# Patient Record
Sex: FEMALE | Race: WHITE | NOT HISPANIC OR LATINO | Employment: FULL TIME | URBAN - METROPOLITAN AREA
[De-identification: names, ages, dates, MRNs, and addresses within clinical notes are randomized per-mention and may not be internally consistent; named-entity substitution may affect disease eponyms.]

---

## 2017-01-25 ENCOUNTER — GENERIC CONVERSION - ENCOUNTER (OUTPATIENT)
Dept: OTHER | Facility: OTHER | Age: 57
End: 2017-01-25

## 2017-02-27 ENCOUNTER — ALLSCRIPTS OFFICE VISIT (OUTPATIENT)
Dept: OTHER | Facility: OTHER | Age: 57
End: 2017-02-27

## 2017-03-06 ENCOUNTER — ALLSCRIPTS OFFICE VISIT (OUTPATIENT)
Dept: OTHER | Facility: OTHER | Age: 57
End: 2017-03-06

## 2017-03-07 LAB
BILIRUB UR QL STRIP: NORMAL
CLARITY UR: NORMAL
COLOR UR: YELLOW
GLUCOSE (HISTORICAL): NORMAL
HGB UR QL STRIP.AUTO: NORMAL
KETONES UR STRIP-MCNC: NORMAL MG/DL
LEUKOCYTE ESTERASE UR QL STRIP: 500
NITRITE UR QL STRIP: NORMAL
PH UR STRIP.AUTO: 8 [PH]
PROT UR STRIP-MCNC: NORMAL MG/DL
SP GR UR STRIP.AUTO: 1
UROBILINOGEN UR QL STRIP.AUTO: NORMAL

## 2017-03-08 ENCOUNTER — LAB CONVERSION - ENCOUNTER (OUTPATIENT)
Dept: OTHER | Facility: OTHER | Age: 57
End: 2017-03-08

## 2017-03-08 LAB
BACTERIA UR QL AUTO: ABNORMAL /HPF
BILIRUB UR QL STRIP: NEGATIVE
COLOR UR: YELLOW
COMMENT (HISTORICAL): CLEAR
CULTURE RESULT (HISTORICAL): ABNORMAL
CULTURE RESULT (HISTORICAL): NORMAL
FECAL OCCULT BLOOD DIAGNOSTIC (HISTORICAL): NEGATIVE
GLUCOSE (HISTORICAL): NEGATIVE
HYALINE CASTS #/AREA URNS LPF: ABNORMAL /LPF
KETONES UR STRIP-MCNC: NEGATIVE MG/DL
LEUKOCYTE ESTERASE UR QL STRIP: ABNORMAL
NITRITE UR QL STRIP: NEGATIVE
PH UR STRIP.AUTO: 7.5 [PH] (ref 5–8)
PROT UR STRIP-MCNC: NEGATIVE MG/DL
RBC (HISTORICAL): ABNORMAL /HPF
SP GR UR STRIP.AUTO: 1.01 (ref 1–1.03)
SQUAMOUS EPITHELIAL CELLS (HISTORICAL): ABNORMAL /HPF
WBC # BLD AUTO: ABNORMAL /HPF

## 2017-06-02 ENCOUNTER — GENERIC CONVERSION - ENCOUNTER (OUTPATIENT)
Dept: OTHER | Facility: OTHER | Age: 57
End: 2017-06-02

## 2017-06-02 LAB
AMBIG ABBREV CMP14 DEFAULT (HISTORICAL): NORMAL
AMBIG ABBREV LP DEFAULT (HISTORICAL): NORMAL

## 2017-06-03 LAB
CHOLEST SERPL-MCNC: 248 MG/DL (ref 100–199)
HDLC SERPL-MCNC: 63 MG/DL
LDLC SERPL CALC-MCNC: 161 MG/DL (ref 0–99)
TRIGL SERPL-MCNC: 118 MG/DL (ref 0–149)
VLDLC SERPL CALC-MCNC: 24 MG/DL (ref 5–40)

## 2017-06-05 ENCOUNTER — GENERIC CONVERSION - ENCOUNTER (OUTPATIENT)
Dept: OTHER | Facility: OTHER | Age: 57
End: 2017-06-05

## 2017-07-28 ENCOUNTER — ALLSCRIPTS OFFICE VISIT (OUTPATIENT)
Dept: OTHER | Facility: OTHER | Age: 57
End: 2017-07-28

## 2017-07-28 DIAGNOSIS — E78.5 HYPERLIPIDEMIA: ICD-10-CM

## 2017-09-28 ENCOUNTER — ALLSCRIPTS OFFICE VISIT (OUTPATIENT)
Dept: OTHER | Facility: OTHER | Age: 57
End: 2017-09-28

## 2017-09-28 ENCOUNTER — GENERIC CONVERSION - ENCOUNTER (OUTPATIENT)
Dept: OTHER | Facility: OTHER | Age: 57
End: 2017-09-28

## 2017-09-28 LAB — S PYO AG THROAT QL: NEGATIVE

## 2017-10-28 ENCOUNTER — GENERIC CONVERSION - ENCOUNTER (OUTPATIENT)
Dept: OTHER | Facility: OTHER | Age: 57
End: 2017-10-28

## 2017-10-28 LAB
A/G RATIO (HISTORICAL): 1.7 (ref 1.2–2.2)
ALBUMIN SERPL BCP-MCNC: 4.3 G/DL (ref 3.5–5.5)
ALP SERPL-CCNC: 102 IU/L (ref 39–117)
ALT SERPL W P-5'-P-CCNC: 20 IU/L (ref 0–32)
AMBIG ABBREV CMP14 DEFAULT (HISTORICAL): NORMAL
AMBIG ABBREV LP DEFAULT (HISTORICAL): NORMAL
AST SERPL W P-5'-P-CCNC: 22 IU/L (ref 0–40)
BILIRUB SERPL-MCNC: 0.8 MG/DL (ref 0–1.2)
BUN SERPL-MCNC: 13 MG/DL (ref 6–24)
BUN/CREA RATIO (HISTORICAL): 18 (ref 9–23)
CALCIUM SERPL-MCNC: 9.4 MG/DL (ref 8.7–10.2)
CHLORIDE SERPL-SCNC: 101 MMOL/L (ref 96–106)
CHOLEST SERPL-MCNC: 215 MG/DL (ref 100–199)
CO2 SERPL-SCNC: 25 MMOL/L (ref 18–29)
CREAT SERPL-MCNC: 0.73 MG/DL (ref 0.57–1)
EGFR AFRICAN AMERICAN (HISTORICAL): 106 ML/MIN/1.73
EGFR-AMERICAN CALC (HISTORICAL): 92 ML/MIN/1.73
GLUCOSE SERPL-MCNC: 103 MG/DL (ref 65–99)
HDLC SERPL-MCNC: 60 MG/DL
LDLC SERPL CALC-MCNC: 133 MG/DL (ref 0–99)
POTASSIUM SERPL-SCNC: 4.2 MMOL/L (ref 3.5–5.2)
SODIUM SERPL-SCNC: 141 MMOL/L (ref 134–144)
TOT. GLOBULIN, SERUM (HISTORICAL): 2.5 G/DL (ref 1.5–4.5)
TOTAL PROTEIN (HISTORICAL): 6.8 G/DL (ref 6–8.5)
TRIGL SERPL-MCNC: 108 MG/DL (ref 0–149)
VLDLC SERPL CALC-MCNC: 22 MG/DL (ref 5–40)

## 2017-10-30 ENCOUNTER — GENERIC CONVERSION - ENCOUNTER (OUTPATIENT)
Dept: OTHER | Facility: OTHER | Age: 57
End: 2017-10-30

## 2018-01-12 VITALS
WEIGHT: 199 LBS | OXYGEN SATURATION: 98 % | HEART RATE: 79 BPM | RESPIRATION RATE: 17 BRPM | SYSTOLIC BLOOD PRESSURE: 132 MMHG | BODY MASS INDEX: 33.97 KG/M2 | TEMPERATURE: 99.2 F | DIASTOLIC BLOOD PRESSURE: 80 MMHG | HEIGHT: 64 IN

## 2018-01-12 VITALS
HEART RATE: 64 BPM | HEIGHT: 64 IN | BODY MASS INDEX: 34.23 KG/M2 | DIASTOLIC BLOOD PRESSURE: 80 MMHG | SYSTOLIC BLOOD PRESSURE: 140 MMHG | WEIGHT: 200.5 LBS

## 2018-01-12 NOTE — RESULT NOTES
Verified Results  (1) COMPREHENSIVE METABOLIC PANEL [id number] 47:17RT Evaline Lee     Test Name Result Flag Reference   Glucose, Serum 103 mg/dL H 65-99   BUN 13 mg/dL  6-24   Creatinine, Serum 0 73 mg/dL  0 57-1 00   BUN/Creatinine Ratio 18  9-23   Sodium, Serum 141 mmol/L  134-144   Potassium, Serum 4 2 mmol/L  3 5-5 2   Chloride, Serum 101 mmol/L     Carbon Dioxide, Total 25 mmol/L  18-29   Calcium, Serum 9 4 mg/dL  8 7-10 2   Protein, Total, Serum 6 8 g/dL  6 0-8 5   Albumin, Serum 4 3 g/dL  3 5-5 5   Globulin, Total 2 5 g/dL  1 5-4 5   A/G Ratio 1 7  1 2-2 2   Bilirubin, Total 0 8 mg/dL  0 0-1 2   Alkaline Phosphatase, S 102 IU/L     AST (SGOT) 22 IU/L  0-40   ALT (SGPT) 20 IU/L  0-32   eGFR If NonAfricn Am 92 mL/min/1 73  >59   eGFR If Africn Am 106 mL/min/1 73  >59     (LC) Lipid Panel [id number] 08:46AM Evaline Lee     Test Name Result Flag Reference   Cholesterol, Total 215 mg/dL H 100-199   Triglycerides 108 mg/dL  0-149   HDL Cholesterol 60 mg/dL  >39   VLDL Cholesterol Dami 22 mg/dL  5-40   LDL Cholesterol Calc 133 mg/dL H 0-99     [facility]) Cy Pyo CMP14 Default [id number] 08:46AM Evaline Lee     Test Name Result Flag Reference   Cy Pyo CMP14 Default Comment     A hand-written panel/profile was received from your office  In  accordance with the [facility] Ambiguous Test Code Policy dated July 3610, we have completed your order by using the closest currently  or formerly recognized AMA panel  We have assigned Comprehensive  Metabolic Panel (14), Test Code #093692 to this request   If this  is not the testing you wished to receive on this specimen, please  contact the [address] [place] [address] Client Inquiry/Technical Services Department  to clarify the test order  We appreciate your business  [facility]) Cy Pyo LP Default [id number] 08:46AM Evaline Lee     Test Name Result Flag Reference   Ambig Abbrev LP Default Comment     A hand-written panel/profile was received from your office  In  accordance with the [facility] Ambiguous Test Code Policy dated July 5531, we have completed your order by using the closest currently  or formerly recognized AMA panel  We have assigned Lipid Panel,  Test Code #115862 to this request  If this is not the testing you  wished to receive on this specimen, please contact the [facility]  Client Inquiry/Technical Services Department to clarify the test  order  We appreciate your business

## 2018-01-13 NOTE — RESULT NOTES
Verified Results  Rapid StrepA- POC [id number] 07:23PM [facility]     Test Name Result Flag Reference   Rapid Strep Negative

## 2018-01-14 VITALS
HEIGHT: 64 IN | WEIGHT: 201 LBS | HEART RATE: 78 BPM | BODY MASS INDEX: 34.31 KG/M2 | SYSTOLIC BLOOD PRESSURE: 122 MMHG | DIASTOLIC BLOOD PRESSURE: 80 MMHG

## 2018-01-14 VITALS
TEMPERATURE: 98.2 F | DIASTOLIC BLOOD PRESSURE: 86 MMHG | BODY MASS INDEX: 33.12 KG/M2 | HEART RATE: 72 BPM | RESPIRATION RATE: 16 BRPM | HEIGHT: 64 IN | WEIGHT: 194 LBS | SYSTOLIC BLOOD PRESSURE: 138 MMHG

## 2018-01-16 NOTE — RESULT NOTES
Verified Results  (1) COMPREHENSIVE METABOLIC PANEL [id number] 14:92LY [facility]     Test Name Result Flag Reference   Glucose, Serum 95 mg/dL  65-99   BUN 14 mg/dL  6-24   Creatinine, Serum 0 74 mg/dL  0 57-1 00   BUN/Creatinine Ratio 19  9-23   Sodium, Serum 141 mmol/L  134-144   Potassium, Serum 4 2 mmol/L  3 5-5 2   Chloride, Serum 101 mmol/L     Carbon Dioxide, Total 22 mmol/L  18-29   Calcium, Serum 9 3 mg/dL  8 7-10 2   Protein, Total, Serum 7 2 g/dL  6 0-8 5   Albumin, Serum 4 4 g/dL  3 5-5 5   Globulin, Total 2 8 g/dL  1 5-4 5   A/G Ratio 1 6  1 2-2 2   Bilirubin, Total 0 8 mg/dL  0 0-1 2   Alkaline Phosphatase, S 100 IU/L     AST (SGOT) 21 IU/L  0-40   ALT (SGPT) 19 IU/L  0-32   eGFR If NonAfricn Am 91 mL/min/1 73  >59   eGFR If Africn Am 105 mL/min/1 73  >59     (LC) Lipid Panel [id number] 10:39AM [facility]     Test Name Result Flag Reference   Cholesterol, Total 248 mg/dL H 100-199   Triglycerides 118 mg/dL  0-149   HDL Cholesterol 63 mg/dL  >39   VLDL Cholesterol Dami 24 mg/dL  5-40   LDL Cholesterol Calc 161 mg/dL H 0-99     [facility]) Ap Kang CMP14 Default [id number] 10:39AM [facility]     Test Name Result Flag Reference   Ap Kang CMP14 Default Comment     A hand-written panel/profile was received from your office  In  accordance with the [facility] Ambiguous Test Code Policy dated July 8227, we have completed your order by using the closest currently  or formerly recognized AMA panel  We have assigned Comprehensive  Metabolic Panel (14), Test Code #728166 to this request   If this  is not the testing you wished to receive on this specimen, please  contact the [address] [place] [address] Client Inquiry/Technical Services Department  to clarify the test order  We appreciate your business  [facility]) Ap Dines LP Default [id number] 10:39AM [facility]     Test Name Result Flag Reference   Ambig Abbrev LP Default Comment     A hand-written panel/profile was received from your office  In  accordance with the [facility] Ambiguous Test Code Policy dated July 8803, we have completed your order by using the closest currently  or formerly recognized AMA panel  We have assigned Lipid Panel,  Test Code #542123 to this request  If this is not the testing you  wished to receive on this specimen, please contact the [address] [place] [address]  Client Inquiry/Technical Services Department to clarify the test  order  We appreciate your business

## 2018-01-22 VITALS
DIASTOLIC BLOOD PRESSURE: 80 MMHG | RESPIRATION RATE: 14 BRPM | OXYGEN SATURATION: 98 % | SYSTOLIC BLOOD PRESSURE: 122 MMHG | HEART RATE: 87 BPM

## 2018-01-22 VITALS — HEIGHT: 64 IN | BODY MASS INDEX: 34.15 KG/M2 | RESPIRATION RATE: 17 BRPM | WEIGHT: 200 LBS

## 2018-02-16 ENCOUNTER — CONSULT (OUTPATIENT)
Dept: VASCULAR SURGERY | Facility: CLINIC | Age: 58
End: 2018-02-16
Payer: COMMERCIAL

## 2018-02-16 VITALS
RESPIRATION RATE: 16 BRPM | HEART RATE: 82 BPM | HEIGHT: 63 IN | DIASTOLIC BLOOD PRESSURE: 82 MMHG | WEIGHT: 197 LBS | TEMPERATURE: 97.4 F | BODY MASS INDEX: 34.91 KG/M2 | SYSTOLIC BLOOD PRESSURE: 140 MMHG

## 2018-02-16 DIAGNOSIS — I83.811 VARICOSE VEINS OF RIGHT LOWER EXTREMITY WITH PAIN: Primary | ICD-10-CM

## 2018-02-16 PROBLEM — K29.70 GASTRITIS: Status: ACTIVE | Noted: 2017-07-28

## 2018-02-16 PROBLEM — M54.50 LOW BACK PAIN: Status: ACTIVE | Noted: 2017-02-27

## 2018-02-16 PROBLEM — I83.10 VARICOSE VEINS WITH INFLAMMATION: Status: ACTIVE | Noted: 2017-10-30

## 2018-02-16 PROBLEM — K44.9 HERNIA, HIATAL: Status: ACTIVE | Noted: 2017-07-28

## 2018-02-16 PROBLEM — G57.12 MERALGIA PARESTHETICA, LEFT LOWER LIMB: Status: ACTIVE | Noted: 2017-02-27

## 2018-02-16 PROCEDURE — 99244 OFF/OP CNSLTJ NEW/EST MOD 40: CPT | Performed by: NURSE PRACTITIONER

## 2018-02-16 NOTE — PATIENT INSTRUCTIONS
Bilateral lower extremity leg pain  -Your pain sounds multifactorial, related possibly to your back/discs and varicose veins  -Wearing compression stockings may help manage your symptoms  -Elevating, staying active and maintaining a healthy weight could help your symptoms  -If your pain gets worse or you develop swelling notify the office, otherwise we will be happy to see you on an as needed basis

## 2018-02-16 NOTE — PROGRESS NOTES
Assessment/Plan:    Varicose veins right lower extremity, bilateral lower extremity pain  -Experiencing bilateral lower extremity pain, no edema  Pain described as "traveling" down side of left leg, more consistent with neurogenic/lumbosacral origin, as well as aching only to right thigh, likely multifactorial  -RLE spider varicosities with small truncal vein at distal thigh  -Discussed the pathophysiology of venous disease  Advised the use of knee high compression stockings, periodic elevation, regular physical activity and weight management  -If her symptoms worsen she should notify the office, otherwise we will be happy to see her on an as needed basis     Diagnoses and all orders for this visit:    Varicose veins of right lower extremity with pain  -     Compression Stocking        Vitals:    02/16/18 0929   BP: 140/82   BP Location: Right arm   Patient Position: Sitting   Cuff Size: Adult   Pulse: 82   Resp: 16   Temp: (!) 97 4 °F (36 3 °C)   TempSrc: Tympanic   Weight: 89 4 kg (197 lb)   Height: 5' 3" (1 6 m)       Patient Active Problem List   Diagnosis    Blood pressure elevated without history of HTN    Neck pain    Esophageal reflux    Gastritis    Hernia, hiatal    Hyperlipidemia    Idiopathic peripheral neuropathy    Low back pain    Meralgia paresthetica, left lower limb    Varicose veins of right lower extremity with pain    Easy bruising       Past Surgical History:   Procedure Laterality Date    CHOLECYSTECTOMY      GALLBLADDER SURGERY         No family history on file  Social History     Social History    Marital status: /Civil Union     Spouse name: N/A    Number of children: N/A    Years of education: N/A     Occupational History    Not on file       Social History Main Topics    Smoking status: Former Smoker    Smokeless tobacco: Never Used      Comment: Quit at age 32    Alcohol use Yes      Comment: social    Drug use: No    Sexual activity: Not on file Other Topics Concern    Not on file     Social History Narrative    No narrative on file       No Known Allergies      Current Outpatient Prescriptions:     coenzyme Q-10 100 MG capsule, Take by mouth, Disp: , Rfl:     esomeprazole (NexIUM) 40 MG capsule, Take 1 capsule by mouth daily, Disp: , Rfl:     Omega-3 Fatty Acids (FISH OIL) 1,000 mg, Take by mouth, Disp: , Rfl:     minocycline (MINOCIN) 50 mg capsule, Take 1 capsule by mouth daily, Disp: , Rfl:     nabumetone (RELAFEN) 500 mg tablet, Take 1 tablet by mouth every 12 (twelve) hours as needed, Disp: , Rfl:     Chief Complaint: Pt is here because " I want to find out how to stop my varicose veins from progressing and what can be done with the ones I have  Also to clarify the pain in my legs, if it is coming from my veins"  Pt is here to evaluate VV/Spider veins to bilateral legs R>L  Pt states her veins are bulging and painful  Patient ID: Sachin Tineo is a 62 y o  female  Patient seen for evaluation of bilateral lower extremity pain  Pain is worse in the right leg where varicose veins are present  On the left leg she complains of pain to the left medial thigh when pressing it with her fingertips, as well as pain that travels down the side calf into her ankle  The pain in her right leg is located over lying varicose veins  She states that it aches when the veins or bulging  Her work until stenting for 6 hours, which makes the veins bulge more  The pain is not necessarily made worse at that time  She denies any edema  She does not wear compression stockings  She does not elevate  The following portions of the patient's history were reviewed and updated as appropriate: allergies, current medications, past family history, past medical history, past social history, past surgical history and problem list     Review of Systems   Constitutional: Negative  HENT: Negative  Eyes: Positive for pain  Respiratory: Negative  Cardiovascular: Negative  Painful veins   Gastrointestinal: Positive for constipation  Endocrine: Negative  Genitourinary: Negative  Musculoskeletal: Positive for joint swelling  Lumbar pain  Limp pain   Skin: Negative  Allergic/Immunologic: Negative  Neurological: Negative  Hematological: Negative  Psychiatric/Behavioral: Negative  Objective:     Physical Exam   Constitutional: She is oriented to person, place, and time  She appears well-developed and well-nourished  HENT:   Head: Normocephalic and atraumatic  Eyes: Conjunctivae are normal    Neck: Neck supple  No JVD present  Cardiovascular: Normal rate, regular rhythm and normal heart sounds  Pulses:       Dorsalis pedis pulses are 2+ on the right side, and 2+ on the left side  Posterior tibial pulses are 2+ on the left side  Spider varicosities right thigh and small truncal varicosity distal thigh, no varicose veins LLE   Pulmonary/Chest: Effort normal    Abdominal: Soft  She exhibits no distension  There is no tenderness  Musculoskeletal: Normal range of motion  She exhibits no edema  Neurological: She is alert and oriented to person, place, and time  Skin: Skin is warm and dry  Psychiatric: She has a normal mood and affect

## 2018-04-09 DIAGNOSIS — K21.9 GERD WITHOUT ESOPHAGITIS: Primary | ICD-10-CM

## 2018-04-09 RX ORDER — ESOMEPRAZOLE MAGNESIUM 40 MG/1
40 CAPSULE, DELAYED RELEASE ORAL DAILY
Qty: 30 CAPSULE | Refills: 0 | Status: SHIPPED | OUTPATIENT
Start: 2018-04-09 | End: 2018-05-07 | Stop reason: SDUPTHER

## 2018-04-14 LAB
ALBUMIN SERPL-MCNC: 4.3 G/DL (ref 3.5–5.5)
ALBUMIN/GLOB SERPL: 1.7 {RATIO} (ref 1.2–2.2)
ALP SERPL-CCNC: 99 IU/L (ref 39–117)
ALT SERPL-CCNC: 18 IU/L (ref 0–32)
AMBIG ABBREV DEFAULT: NORMAL
AMBIG ABBREV DEFAULT: NORMAL
AST SERPL-CCNC: 20 IU/L (ref 0–40)
BILIRUB SERPL-MCNC: 0.7 MG/DL (ref 0–1.2)
BUN SERPL-MCNC: 21 MG/DL (ref 6–24)
BUN/CREAT SERPL: 28 (ref 9–23)
CALCIUM SERPL-MCNC: 9.5 MG/DL (ref 8.7–10.2)
CHLORIDE SERPL-SCNC: 102 MMOL/L (ref 96–106)
CHOLEST SERPL-MCNC: 217 MG/DL (ref 100–199)
CO2 SERPL-SCNC: 22 MMOL/L (ref 18–29)
CREAT SERPL-MCNC: 0.76 MG/DL (ref 0.57–1)
GLOBULIN SER-MCNC: 2.6 G/DL (ref 1.5–4.5)
GLUCOSE SERPL-MCNC: 99 MG/DL (ref 65–99)
HDLC SERPL-MCNC: 65 MG/DL
LDLC SERPL CALC-MCNC: 131 MG/DL (ref 0–99)
POTASSIUM SERPL-SCNC: 4.2 MMOL/L (ref 3.5–5.2)
PROT SERPL-MCNC: 6.9 G/DL (ref 6–8.5)
SL AMB EGFR AFRICAN AMERICAN: 101 ML/MIN/1.73
SL AMB EGFR NON AFRICAN AMERICAN: 87 ML/MIN/1.73
SL AMB VLDL CHOLESTEROL CALC: 21 MG/DL (ref 5–40)
SODIUM SERPL-SCNC: 140 MMOL/L (ref 134–144)
TRIGL SERPL-MCNC: 106 MG/DL (ref 0–149)

## 2018-04-23 DIAGNOSIS — E78.5 HYPERLIPIDEMIA: ICD-10-CM

## 2018-05-04 RX ORDER — MELOXICAM 15 MG/1
TABLET ORAL
COMMUNITY
Start: 2018-02-16 | End: 2018-05-04

## 2018-05-07 ENCOUNTER — OFFICE VISIT (OUTPATIENT)
Dept: FAMILY MEDICINE CLINIC | Facility: CLINIC | Age: 58
End: 2018-05-07
Payer: COMMERCIAL

## 2018-05-07 VITALS
WEIGHT: 199 LBS | OXYGEN SATURATION: 98 % | HEIGHT: 63 IN | SYSTOLIC BLOOD PRESSURE: 132 MMHG | DIASTOLIC BLOOD PRESSURE: 76 MMHG | BODY MASS INDEX: 35.26 KG/M2 | RESPIRATION RATE: 16 BRPM | HEART RATE: 82 BPM

## 2018-05-07 DIAGNOSIS — I83.811 VARICOSE VEINS OF RIGHT LOWER EXTREMITY WITH PAIN: ICD-10-CM

## 2018-05-07 DIAGNOSIS — G60.9 IDIOPATHIC PERIPHERAL NEUROPATHY: ICD-10-CM

## 2018-05-07 DIAGNOSIS — K21.00 GASTROESOPHAGEAL REFLUX DISEASE WITH ESOPHAGITIS: ICD-10-CM

## 2018-05-07 DIAGNOSIS — K21.9 GERD WITHOUT ESOPHAGITIS: ICD-10-CM

## 2018-05-07 DIAGNOSIS — K44.9 HERNIA, HIATAL: ICD-10-CM

## 2018-05-07 DIAGNOSIS — E78.5 HYPERLIPIDEMIA, UNSPECIFIED HYPERLIPIDEMIA TYPE: Primary | ICD-10-CM

## 2018-05-07 DIAGNOSIS — G57.12 MERALGIA PARESTHETICA, LEFT LOWER LIMB: ICD-10-CM

## 2018-05-07 DIAGNOSIS — Z12.39 BREAST CANCER SCREENING: ICD-10-CM

## 2018-05-07 PROCEDURE — 99214 OFFICE O/P EST MOD 30 MIN: CPT | Performed by: FAMILY MEDICINE

## 2018-05-07 RX ORDER — ESOMEPRAZOLE MAGNESIUM 40 MG/1
40 CAPSULE, DELAYED RELEASE ORAL DAILY
Qty: 90 CAPSULE | Refills: 1 | Status: SHIPPED | OUTPATIENT
Start: 2018-05-07 | End: 2019-02-15 | Stop reason: SDUPTHER

## 2018-05-07 NOTE — PROGRESS NOTES
Subjective:      Patient ID: Sadiq Guevara is a 62 y o  female  80-year-old female presents for follow-up of chronic medical conditions including hyperlipidemia, meralgia paresthetica of the left leg  Overall the patient has been feeling well  In the past she was intolerant of statins  She has been taking 1 capsule of red yeast rice and 1 tablet of coenzyme Q10  Her cholesterol is significantly improved in fairly well controlled on this  Total cholesterol 217, HDL 65,   Patient did receive telephone call from a bariatric surgeon in regards to schedule an appointment to discuss treatment for her hiatal hernia  She has not made this yet  She has persistent reflux symptoms particularly when she does not take her Nexium  She states that when she does not take the Nexium she cannot eat due to the discomfort and reflux  When she does take Nexium she has no difficulty eating as there is no discomfort  To her this does pose something of a problem because it does not help with her weight loss  Patient does continue to have intermittent pains going down the left lower extremity across the left anterior thigh  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 to 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 because she tends to help out with her left leg bleeding 1st   She has never gotten any weakness or buckling of her left lower extremity  Patient has taken Relafen in the past which did provide some relief but at times irritates her GERD  Patient is in need for screening mammogram   Patient did see vascular surgery in consultation in regards to varicose veins and spider veins  She will not be pursuing treatment for these          Past Medical History:   Diagnosis Date    Breast lump     last assessed 8/24/10    Myalgia     last assessed  1/29/15    Myositis     last assessed  1/29/15    Pernicious anemia        Family History   Problem Relation Age of Onset    Atrial fibrillation Mother     Breast cancer Mother     Hypertension Mother     Heart disease Father      arteriosclerotic    Hypertension Father     Aneurysm Sister      abdominal aortic    Heart disease Brother      arteriosclerotic    Ovarian cancer Maternal Grandmother        Past Surgical History:   Procedure Laterality Date    CHOLECYSTECTOMY      GALLBLADDER SURGERY      HERNIA REPAIR      TUBAL LIGATION      URETHRAL SLING          reports that she has quit smoking  She has never used smokeless tobacco  She reports that she drinks alcohol  She reports that she does not use drugs  Current Outpatient Prescriptions:     coenzyme Q-10 100 MG capsule, Take by mouth, Disp: , Rfl:     esomeprazole (NexIUM) 40 MG capsule, Take 1 capsule (40 mg total) by mouth daily, Disp: 30 capsule, Rfl: 0    nabumetone (RELAFEN) 500 mg tablet, Take 1 tablet by mouth every 12 (twelve) hours as needed, Disp: , Rfl:     Omega-3 Fatty Acids (FISH OIL) 1,000 mg, Take by mouth, Disp: , Rfl:     The following portions of the patient's history were reviewed and updated as appropriate: allergies, current medications, past family history, past medical history, past social history, past surgical history and problem list     Review of Systems   Constitutional: Negative  HENT: Negative  Eyes: Negative  Respiratory: Negative  Cardiovascular: Negative  Gastrointestinal: Negative  Chronic GERD   Endocrine: Negative  Genitourinary: Negative  Musculoskeletal: Positive for back pain and myalgias ( pain in the left lower leg intermittently especially across the anterior thigh)  Negative for gait problem  Skin: Negative  Allergic/Immunologic: Negative  Neurological: Negative  Hematological: Negative  Psychiatric/Behavioral: Negative              Objective:    /76 (BP Location: Left arm, Patient Position: Sitting, Cuff Size: Standard)   Pulse 82   Resp 16   Ht 5' 3" (1 6 m)   Wt 90 3 kg (199 lb)   SpO2 98%   BMI 35 25 kg/m²      Physical Exam   Constitutional: She is oriented to person, place, and time  She appears well-developed and well-nourished  HENT:   Head: Normocephalic and atraumatic  Eyes: Conjunctivae are normal  Pupils are equal, round, and reactive to light  Neck: Normal range of motion  Neck supple  Cardiovascular: Normal rate and regular rhythm  Pulmonary/Chest: Effort normal and breath sounds normal    Abdominal: Soft  Bowel sounds are normal    Neurological: She is alert and oriented to person, place, and time  She has normal reflexes  Psychiatric: She has a normal mood and affect  Her behavior is normal  Judgment and thought content normal    Nursing note and vitals reviewed          Recent Results (from the past 1008 hour(s))   Comprehensive metabolic panel    Collection Time: 04/13/18  7:36 AM   Result Value Ref Range    SL AMB GLUCOSE 99 65 - 99 mg/dL    BUN 21 6 - 24 mg/dL    Creatinine, Serum 0 76 0 57 - 1 00 mg/dL    eGFR Non  87 >59 mL/min/1 73    SL AMB EGFR AFRICAN AMERICAN 101 >59 mL/min/1 73    SL AMB BUN/CREATININE RATIO 28 (H) 9 - 23    SL AMB SODIUM 140 134 - 144 mmol/L    SL AMB POTASSIUM 4 2 3 5 - 5 2 mmol/L    SL AMB CHLORIDE 102 96 - 106 mmol/L    SL AMB CARBON DIOXIDE 22 18 - 29 mmol/L    CALCIUM 9 5 8 7 - 10 2 mg/dL    SL AMB PROTEIN, TOTAL 6 9 6 0 - 8 5 g/dL    Serum Albumin 4 3 3 5 - 5 5 g/dL    Globulin, Total 2 6 1 5 - 4 5 g/dL    SL AMB ALBUMIN/GLOBULIN RATIO 1 7 1 2 - 2 2    SL AMB BILIRUBIN, TOTAL 0 7 0 0 - 1 2 mg/dL    Alk Phos Isoenzymes 99 39 - 117 IU/L    SL AMB AST 20 0 - 40 IU/L    SL AMB ALT 18 0 - 32 IU/L   Lipid panel    Collection Time: 04/13/18  7:36 AM   Result Value Ref Range    Cholesterol, Total 217 (H) 100 - 199 mg/dL    Triglycerides 106 0 - 149 mg/dL    SL AMB HDL CHOLESTEROL 65 >39 mg/dL    SL AMB VLDL CHOLESTEROL CALC 21 5 - 40 mg/dL    SL AMB LDL-CHOLESTEROL 131 (H) 0 - 99 mg/dL   Izzy Nuñez Default    Collection Time: 04/13/18 7:36 AM   Result Value Ref Range    GAMAL BHATTREV DEFAULT Comment    Michael Teran Default    Collection Time: 04/13/18  7:36 AM   Result Value Ref Range    GAAML BHATTREV DEFAULT Comment        Assessment/Plan:         Problem List Items Addressed This Visit     Esophageal reflux     Esophageal reflux is controlled so long as the patient remains on Nexium  Without Nexium she has severe GERD and is unable to eat due to reflux  Relevant Medications    esomeprazole (NexIUM) 40 MG capsule    Other Relevant Orders    TSH, 3rd generation with T4 reflex    Hernia, hiatal       Once again I made recommendations to the patient in regards to treatment  She was given referral to bariatric surgery team that does perform Nissen fundoplication laparoscopically  This would be in her best interest to be able to control her GERD and get her off of chronic PPI therapy  Patient does have information for bariatric surgeon and she will contact them to schedule an appointment         Relevant Medications    esomeprazole (NexIUM) 40 MG capsule    Other Relevant Orders    TSH, 3rd generation with T4 reflex    Hyperlipidemia - Primary    Relevant Medications    Red Yeast Rice 600 MG CAPS    Other Relevant Orders    CBC and differential    Comprehensive metabolic panel    Lipid panel    TSH, 3rd generation with T4 reflex    RESOLVED: Idiopathic peripheral neuropathy    Relevant Orders    CBC and differential    TSH, 3rd generation with T4 reflex    Meralgia paresthetica, left lower limb      Once again I did recommend that the patient see the chiropractor  She does have history of bulging disc  The discomfort only occurs in her left leg and is intermittent in nature  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    I believe this is mechanical in nature and would benefit from chiropractic treatment and working on her ergonomic motions         Relevant Orders TSH, 3rd generation with T4 reflex    Varicose veins of right lower extremity with pain      Patient did see vascular surgery in consultation  She was told that she would have to have 3 appointments where she was complaining of phlebitic pain in order for them to perform any surgical intervention  Spider veins would be cosmetic for treatment and cost her roughly 500 dollars  Patient is not interested in pursuing this         Breast cancer screening      Order provided for screening mammogram   Patient is overdue    Last mammogram was performed in 2016         Relevant Orders    TSH, 3rd generation with T4 reflex    Mammo screening bilateral w cad      Other Visit Diagnoses     GERD without esophagitis        Relevant Medications    esomeprazole (NexIUM) 40 MG capsule    Other Relevant Orders    TSH, 3rd generation with T4 reflex

## 2018-05-08 RX ORDER — AMPICILLIN TRIHYDRATE 250 MG
1 CAPSULE ORAL
Refills: 0
Start: 2018-05-08 | End: 2021-12-13

## 2018-05-08 NOTE — ASSESSMENT & PLAN NOTE
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

## 2018-05-08 NOTE — ASSESSMENT & PLAN NOTE
Esophageal reflux is controlled so long as the patient remains on Nexium  Without Nexium she has severe GERD and is unable to eat due to reflux

## 2018-05-08 NOTE — ASSESSMENT & PLAN NOTE
Once again I made recommendations to the patient in regards to treatment  She was given referral to bariatric surgery team that does perform Nissen fundoplication laparoscopically  This would be in her best interest to be able to control her GERD and get her off of chronic PPI therapy    Patient does have information for bariatric surgeon and she will contact them to schedule an appointment

## 2018-05-08 NOTE — ASSESSMENT & PLAN NOTE
Hyperlipidemia is fairly well controlled  Patient is simply taking red rice yeast and coenzyme Q10  She has no myalgias related to this and it does seem to be effective  Continue on same    Repeat lipid profile in 6 months

## 2018-05-08 NOTE — ASSESSMENT & PLAN NOTE
Patient did see vascular surgery in consultation  She was told that she would have to have 3 appointments where she was complaining of phlebitic pain in order for them to perform any surgical intervention  Spider veins would be cosmetic for treatment and cost her roughly 500 dollars    Patient is not interested in pursuing this

## 2018-07-11 ENCOUNTER — OFFICE VISIT (OUTPATIENT)
Dept: OBGYN CLINIC | Facility: CLINIC | Age: 58
End: 2018-07-11
Payer: COMMERCIAL

## 2018-07-11 ENCOUNTER — OFFICE VISIT (OUTPATIENT)
Dept: FAMILY MEDICINE CLINIC | Facility: CLINIC | Age: 58
End: 2018-07-11
Payer: COMMERCIAL

## 2018-07-11 VITALS
HEIGHT: 63 IN | HEART RATE: 78 BPM | RESPIRATION RATE: 16 BRPM | BODY MASS INDEX: 34.76 KG/M2 | SYSTOLIC BLOOD PRESSURE: 130 MMHG | DIASTOLIC BLOOD PRESSURE: 80 MMHG | OXYGEN SATURATION: 97 % | WEIGHT: 196.2 LBS | TEMPERATURE: 98.1 F

## 2018-07-11 VITALS
WEIGHT: 195 LBS | DIASTOLIC BLOOD PRESSURE: 88 MMHG | HEIGHT: 63 IN | SYSTOLIC BLOOD PRESSURE: 138 MMHG | BODY MASS INDEX: 34.55 KG/M2

## 2018-07-11 DIAGNOSIS — N89.8 VAGINAL IRRITATION: ICD-10-CM

## 2018-07-11 DIAGNOSIS — R19.7 DIARRHEA, UNSPECIFIED TYPE: Primary | ICD-10-CM

## 2018-07-11 DIAGNOSIS — N89.8 VAGINAL ITCHING: Primary | ICD-10-CM

## 2018-07-11 DIAGNOSIS — R35.0 URINARY FREQUENCY: ICD-10-CM

## 2018-07-11 LAB
SL AMB  POCT GLUCOSE, UA: ABNORMAL
SL AMB LEUKOCYTE ESTERASE,UA: 25
SL AMB POCT BILIRUBIN,UA: ABNORMAL
SL AMB POCT BLOOD,UA: ABNORMAL
SL AMB POCT CLARITY,UA: ABNORMAL
SL AMB POCT COLOR,UA: YELLOW
SL AMB POCT KETONES,UA: ABNORMAL
SL AMB POCT NITRITE,UA: ABNORMAL
SL AMB POCT PH,UA: 6
SL AMB POCT SPECIFIC GRAVITY,UA: 1.01
SL AMB POCT URINE PROTEIN: ABNORMAL
SL AMB POCT UROBILINOGEN: ABNORMAL

## 2018-07-11 PROCEDURE — 99213 OFFICE O/P EST LOW 20 MIN: CPT | Performed by: NURSE PRACTITIONER

## 2018-07-11 PROCEDURE — 81002 URINALYSIS NONAUTO W/O SCOPE: CPT | Performed by: NURSE PRACTITIONER

## 2018-07-11 NOTE — PROGRESS NOTES
Assessment/Plan:      Diagnoses and all orders for this visit:    Diarrhea, unspecified type  -     Stool Enteric Bacterial Panel by PCR; Future  -     Clostridium difficile toxin by PCR; Future  -     Comprehensive metabolic panel; Future  -     CBC and differential; Future    Urinary frequency  -     POCT urine dip  -     UA/M w/rflx Culture, Routine    Vaginal irritation  Patient has an appointment with her GYN today  Patient has had diarrhea for the past 3 weeks  Lab work and stool cultures ordered  Patient instructed to avoid dairy and spicy foods  Patient also reports increased urinary frequency, however she is drinking lots of fluids because of the diarrhea  Urinalysis done and reviewed  Urine showed alittle bit of leukocytes but was otherwise normal  Discussed with the patient that her frequency may be caused her increase in fluid intake  Urine sent for culture to be sure  Patient reports that she has an appointment with her GYN this afternoon for the urinary frequency and vaginal irritation  Will follow-up with the patient after lab work and culture results are received  Patient instructed to follow-up sooner prn  Subjective:     Patient ID: Harry Castellanos is a 62 y o  female  Patient reports diarrhea for the past 3 weeks  Patient reports that she has at least 3 episodes of diarrhea a day  Patient reports that sometimes it is watery and sometimes it is loose  Patient reports that she has not checked her temperature  Patient reports that she has not had a bowel movement yet today  Patient reports that she has been drinking lots of fluids  Patient reports occasional abdominal cramps  Denies any blood in the stool  Denies any vomiting  Patient reports alittle nausea  Patient is concerned about salmonella  Patient reports that she is taking her nexium as prescribed  Patient reports that she tried Pepto-bismol OTC which helped alittle       Patient reports increased urinary frequency for a couple of weeks  Patient also reports occasional urinary urgency  Patient reports that she has been drinking plenty fluids because of the diarrhea  Denies any hematuria  Patient reports occasional vaginal irritation and itching  Patient reports that she tried vagisil and monistat  Patient reports that she is not sure if her symptoms are related to her urethral sling  Patient reports that her symptoms are not going away  Patient reports that she has an appointment with the GYN this afternoon  Review of Systems   Constitutional: Negative for chills and fever  HENT: Negative for congestion, ear pain and sore throat  Respiratory: Negative for cough, shortness of breath and wheezing  Cardiovascular: Negative for chest pain and palpitations  Gastrointestinal:        As noted in HPI  Genitourinary:        As noted in HPI  Skin: Negative for rash  Neurological: Negative for dizziness, seizures, syncope and headaches  Objective:     Physical Exam   Constitutional: She is oriented to person, place, and time  No distress  HENT:   Right Ear: External ear normal    Left Ear: External ear normal    Mouth/Throat: Oropharynx is clear and moist    Cardiovascular: Normal rate, regular rhythm and normal heart sounds  Radial pulses +2 bilaterally  No edema noted  Pulmonary/Chest: Effort normal and breath sounds normal  She has no wheezes  Abdominal: Soft  She exhibits no mass  Patient reports slight tenderness on palpation of the mid lower abdomen  Musculoskeletal:   Gait wnl  Neurological: She is alert and oriented to person, place, and time  Skin: No rash noted  Psychiatric: She has a normal mood and affect  Vitals reviewed

## 2018-07-12 LAB
ALBUMIN SERPL-MCNC: 4.3 G/DL (ref 3.5–5.5)
ALBUMIN/GLOB SERPL: 1.7 {RATIO} (ref 1.2–2.2)
ALP SERPL-CCNC: 98 IU/L (ref 39–117)
ALT SERPL-CCNC: 19 IU/L (ref 0–32)
AMBIG ABBREV DEFAULT: NORMAL
AST SERPL-CCNC: 17 IU/L (ref 0–40)
BASOPHILS # BLD AUTO: 0 X10E3/UL (ref 0–0.2)
BASOPHILS NFR BLD AUTO: 1 %
BILIRUB SERPL-MCNC: 0.6 MG/DL (ref 0–1.2)
BUN SERPL-MCNC: 11 MG/DL (ref 6–24)
BUN/CREAT SERPL: 14 (ref 9–23)
CALCIUM SERPL-MCNC: 9.6 MG/DL (ref 8.7–10.2)
CHLORIDE SERPL-SCNC: 101 MMOL/L (ref 96–106)
CO2 SERPL-SCNC: 22 MMOL/L (ref 20–29)
CREAT SERPL-MCNC: 0.78 MG/DL (ref 0.57–1)
EOSINOPHIL # BLD AUTO: 0.1 X10E3/UL (ref 0–0.4)
EOSINOPHIL NFR BLD AUTO: 1 %
ERYTHROCYTE [DISTWIDTH] IN BLOOD BY AUTOMATED COUNT: 13.8 % (ref 12.3–15.4)
GLOBULIN SER-MCNC: 2.6 G/DL (ref 1.5–4.5)
GLUCOSE SERPL-MCNC: 96 MG/DL (ref 65–99)
HCT VFR BLD AUTO: 40.8 % (ref 34–46.6)
HGB BLD-MCNC: 14.2 G/DL (ref 11.1–15.9)
IMM GRANULOCYTES # BLD: 0 X10E3/UL (ref 0–0.1)
IMM GRANULOCYTES NFR BLD: 0 %
LYMPHOCYTES # BLD AUTO: 1.6 X10E3/UL (ref 0.7–3.1)
LYMPHOCYTES NFR BLD AUTO: 29 %
MCH RBC QN AUTO: 29.2 PG (ref 26.6–33)
MCHC RBC AUTO-ENTMCNC: 34.8 G/DL (ref 31.5–35.7)
MCV RBC AUTO: 84 FL (ref 79–97)
MONOCYTES # BLD AUTO: 0.5 X10E3/UL (ref 0.1–0.9)
MONOCYTES NFR BLD AUTO: 9 %
NEUTROPHILS # BLD AUTO: 3.4 X10E3/UL (ref 1.4–7)
NEUTROPHILS NFR BLD AUTO: 60 %
PLATELET # BLD AUTO: 194 X10E3/UL (ref 150–379)
POTASSIUM SERPL-SCNC: 4.1 MMOL/L (ref 3.5–5.2)
PROT SERPL-MCNC: 6.9 G/DL (ref 6–8.5)
RBC # BLD AUTO: 4.86 X10E6/UL (ref 3.77–5.28)
SL AMB EGFR AFRICAN AMERICAN: 98 ML/MIN/1.73
SL AMB EGFR NON AFRICAN AMERICAN: 85 ML/MIN/1.73
SODIUM SERPL-SCNC: 139 MMOL/L (ref 134–144)
WBC # BLD AUTO: 5.6 X10E3/UL (ref 3.4–10.8)

## 2018-07-12 NOTE — PROGRESS NOTES
Assessment/Plan:    Vaginal itching  Normal exam today  Wet mount negative in office  Genital culture sent, will treat based on results  Reviewed with patient no scented products vaginally, loose cotton clothing, change promptly out of wet bathing suit  Acidophilous BID while symptomatic, daily thereafter  Can use coconut oil for lubrication for atrophy or use with vibrator  RTO for annual exam       Diagnoses and all orders for this visit:    Vaginal itching  -     GP Culture, Genital          Subjective:      Patient ID: Jame Shah is a 62 y o  female  Pt presents for complaints of burning and itching in vaginal area  Pt also having frequent urination and burning  Symptoms began 3 weeks ago  Treated with Monistat one week ago, experienced burning while using Monistat but did not relieve any of her symptoms  She then began to use Vagisil wipes and those too burned  Pt was seen and evaluated today by PCP for urinary symptoms as well as diarrhea  Pt states urine dip in office WNL but is awaiting urine culture results  Denies any new exercise or dietary habits  Denies any new medications  Pt admits to using vibrator with lotion prior to onset of symptoms  States after she used the vibrator is when she noticed the itching and burning  Denies any abnormal discharge or odor  Is not currently sexually active and denies the need for STD testing  Pt is post menopausal         The following portions of the patient's history were reviewed and updated as appropriate: allergies, current medications, past family history, past medical history, past social history, past surgical history and problem list     Review of Systems   Constitutional: Positive for fatigue  Gastrointestinal: Positive for diarrhea  Genitourinary: Positive for dysuria and frequency  Vaginal itching, burning   All other systems reviewed and are negative          Objective:      /88 (BP Location: Left arm, Patient Position: Sitting, Cuff Size: Standard)   Ht 5' 3" (1 6 m)   Wt 88 5 kg (195 lb)   LMP 01/01/2016   BMI 34 54 kg/m²          Physical Exam   Constitutional: She is oriented to person, place, and time  She appears well-developed and well-nourished  HENT:   Head: Normocephalic and atraumatic  Neck: Normal range of motion  Neck supple  No thyromegaly present  Cardiovascular: Normal rate, regular rhythm and normal heart sounds  Pulmonary/Chest: Effort normal and breath sounds normal    Abdominal: Soft  Bowel sounds are normal  She exhibits no distension and no mass  There is no tenderness  There is no rebound and no guarding  Genitourinary: Vagina normal and uterus normal  No labial fusion  There is no rash, tenderness, lesion or injury on the right labia  There is no rash, tenderness, lesion or injury on the left labia  Cervix exhibits no motion tenderness, no discharge and no friability  Right adnexum displays no mass, no tenderness and no fullness  Left adnexum displays no mass, no tenderness and no fullness  Tenderness: vaginal atrophy noted  Musculoskeletal: Normal range of motion  Neurological: She is alert and oriented to person, place, and time  Skin: Skin is warm and dry  Psychiatric: She has a normal mood and affect   Her behavior is normal  Judgment and thought content normal

## 2018-07-12 NOTE — ASSESSMENT & PLAN NOTE
Normal exam today  Wet mount negative in office  Genital culture sent, will treat based on results  Reviewed with patient no scented products vaginally, loose cotton clothing, change promptly out of wet bathing suit  Acidophilous BID while symptomatic, daily thereafter  Can use coconut oil for lubrication for atrophy or use with vibrator     RTO for annual exam

## 2018-07-13 LAB — BACTERIA UR CULT: NORMAL

## 2018-07-14 LAB
ADV 40+41 DNA STL QL NAA+NON-PROBE: NOT DETECTED
APPEARANCE UR: CLEAR
ASTRO TYP 1-8 RNA STL QL NAA+NON-PROBE: NOT DETECTED
BACTERIA UR CULT: NORMAL
BACTERIA UR CULT: NORMAL
BACTERIA UR QL AUTO: ABNORMAL /HPF
BILIRUB UR QL STRIP: NEGATIVE
C CAYETANENSIS DNA STL QL NAA+NON-PROBE: NOT DETECTED
C COLI+JEJ+UPSA DNA STL QL NAA+NON-PROBE: NOT DETECTED
C DIF TOX TCDA+TCDB STL QL NAA+NON-PROBE: NOT DETECTED
C DIFF TOX GENS STL QL NAA+PROBE: NEGATIVE
COLOR UR: YELLOW
CRYPTOSP DNA STL QL NAA+NON-PROBE: NOT DETECTED
E COLI O157 DNA STL QL NAA+NON-PROBE: ABNORMAL
E HISTOLYT DNA STL QL NAA+NON-PROBE: NOT DETECTED
EAEC PAA PLAS AGGR+AATA ST NAA+NON-PRB: NOT DETECTED
EC STX1+STX2 GENES STL QL NAA+NON-PROBE: NOT DETECTED
EPEC EAE GENE STL QL NAA+NON-PROBE: NOT DETECTED
ETEC LTA+ST1A+ST1B TOX ST NAA+NON-PROBE: NOT DETECTED
G LAMBLIA DNA STL QL NAA+NON-PROBE: NOT DETECTED
GLUCOSE UR QL STRIP: NEGATIVE
HGB UR QL STRIP: NEGATIVE
HYALINE CASTS #/AREA URNS LPF: ABNORMAL /LPF
KETONES UR QL STRIP: NEGATIVE
LEUKOCYTE ESTERASE UR QL STRIP: ABNORMAL
NITRITE UR QL STRIP: NEGATIVE
NOROVIRUS GI+II RNA STL QL NAA+NON-PROBE: NOT DETECTED
P SHIGELLOIDES DNA STL QL NAA+NON-PROBE: NOT DETECTED
PH UR STRIP: 6 [PH] (ref 5–8)
PROT UR QL STRIP: NEGATIVE
RBC #/AREA URNS HPF: ABNORMAL /HPF
RVA RNA STL QL NAA+NON-PROBE: NOT DETECTED
S ENT+BONG DNA STL QL NAA+NON-PROBE: DETECTED
SAPO I+II+IV+V RNA STL QL NAA+NON-PROBE: NOT DETECTED
SHIGELLA SP+EIEC IPAH ST NAA+NON-PROBE: NOT DETECTED
SP GR UR STRIP: 1 (ref 1–1.03)
SQUAMOUS #/AREA URNS HPF: ABNORMAL /HPF
V CHOL+PARA+VUL DNA STL QL NAA+NON-PROBE: NOT DETECTED
V CHOLERAE DNA STL QL NAA+NON-PROBE: NOT DETECTED
WBC #/AREA URNS HPF: ABNORMAL /HPF
Y ENTEROCOL DNA STL QL NAA+NON-PROBE: NOT DETECTED

## 2018-07-15 LAB — SL AMB GENITAL CULTURE: ABNORMAL

## 2018-07-16 DIAGNOSIS — A49.8 INFECTION DUE TO NON-O157 SHIGA TOXIN-PRODUCING ESCHERICHIA COLI (E.COLI): Primary | ICD-10-CM

## 2018-07-16 DIAGNOSIS — R30.0 DYSURIA: Primary | ICD-10-CM

## 2018-07-16 RX ORDER — AMOXICILLIN 500 MG/1
500 CAPSULE ORAL EVERY 12 HOURS SCHEDULED
Qty: 14 CAPSULE | Refills: 0 | Status: SHIPPED | OUTPATIENT
Start: 2018-07-16 | End: 2018-07-18 | Stop reason: ALTCHOICE

## 2018-07-16 RX ORDER — METHENAMINE, SODIUM PHOSPHATE, MONOBASIC, MONOHYDRATE, PHENYL SALICYLATE, METHYLENE BLUE, AND HYOSCYAMINE SULFATE 120; 40.8; 36; 10; .12 MG/1; MG/1; MG/1; MG/1; MG/1
118 CAPSULE ORAL 2 TIMES DAILY PRN
Qty: 14 CAPSULE | Refills: 0 | OUTPATIENT
Start: 2018-07-16 | End: 2018-07-23

## 2018-07-16 NOTE — TELEPHONE ENCOUNTER
Pt was told by PCP that lab results were + for e coli  Reviewed orders from [facility] for amoxicillin  Pt also requested refill of med that Dr Judah Kauffman prescribed on Friday for bladder pain

## 2018-07-18 DIAGNOSIS — A02.9 SALMONELLA INFECTION: Primary | ICD-10-CM

## 2018-07-18 DIAGNOSIS — N39.0 URINARY TRACT INFECTION WITHOUT HEMATURIA, SITE UNSPECIFIED: ICD-10-CM

## 2018-07-18 RX ORDER — CIPROFLOXACIN 500 MG/1
500 TABLET, FILM COATED ORAL EVERY 12 HOURS SCHEDULED
Qty: 14 TABLET | Refills: 0 | Status: SHIPPED | OUTPATIENT
Start: 2018-07-18 | End: 2018-07-25

## 2018-07-19 ENCOUNTER — TELEPHONE (OUTPATIENT)
Dept: OBGYN CLINIC | Facility: CLINIC | Age: 58
End: 2018-07-19

## 2018-07-19 NOTE — TELEPHONE ENCOUNTER
Pt heard from PCP yesterday  Her stool was positive for salmonella  They changed her abx tx to cipro, which should cover UTI as well

## 2018-07-26 ENCOUNTER — OFFICE VISIT (OUTPATIENT)
Dept: FAMILY MEDICINE CLINIC | Facility: CLINIC | Age: 58
End: 2018-07-26
Payer: COMMERCIAL

## 2018-07-26 VITALS
TEMPERATURE: 97 F | OXYGEN SATURATION: 98 % | BODY MASS INDEX: 34.91 KG/M2 | WEIGHT: 197 LBS | HEART RATE: 68 BPM | SYSTOLIC BLOOD PRESSURE: 102 MMHG | HEIGHT: 63 IN | DIASTOLIC BLOOD PRESSURE: 70 MMHG

## 2018-07-26 DIAGNOSIS — N39.0 URINARY TRACT INFECTION WITHOUT HEMATURIA, SITE UNSPECIFIED: Primary | ICD-10-CM

## 2018-07-26 LAB
SL AMB  POCT GLUCOSE, UA: NORMAL
SL AMB LEUKOCYTE ESTERASE,UA: ABNORMAL
SL AMB POCT BILIRUBIN,UA: 3
SL AMB POCT BLOOD,UA: ABNORMAL
SL AMB POCT CLARITY,UA: ABNORMAL
SL AMB POCT COLOR,UA: ABNORMAL
SL AMB POCT KETONES,UA: ABNORMAL
SL AMB POCT NITRITE,UA: ABNORMAL
SL AMB POCT PH,UA: 8
SL AMB POCT SPECIFIC GRAVITY,UA: 1.01
SL AMB POCT URINE PROTEIN: 30
SL AMB POCT UROBILINOGEN: 4

## 2018-07-26 PROCEDURE — 81002 URINALYSIS NONAUTO W/O SCOPE: CPT | Performed by: FAMILY MEDICINE

## 2018-07-26 PROCEDURE — 99213 OFFICE O/P EST LOW 20 MIN: CPT | Performed by: FAMILY MEDICINE

## 2018-07-26 RX ORDER — LEVOFLOXACIN 500 MG/1
500 TABLET, FILM COATED ORAL EVERY 24 HOURS
Qty: 5 TABLET | Refills: 0 | Status: SHIPPED | OUTPATIENT
Start: 2018-07-26 | End: 2018-07-31

## 2018-07-26 NOTE — PROGRESS NOTES
Subjective:      Patient ID: Lloyd Guevara is a 62 y o  female  Patient is here to discuss symptoms of  Increased urinary frequency which she started experiencing again last night  Apparently she was diagnosed with UTI, cultures positive for E coli and was treated with ciprofloxacin based on the culture report  Patient states that she took the medication for 7 days  Yesterday was the last day of the medication  Patient states that she is not sure the medication helped her symptoms  Yesterday she started experiencing increased frequency of urination  She does mention that she has been  In the pool more frequently, in her wet  swim suite  It is a chlorine water pool  Last night she also experienced lower abdominal cramps which felt like menstrual cramps,   And symptoms of vaginal pain  she has a urethral sling,  Which was inserted for symptoms of urinary incontinence  Difficulty Urinating    This is a recurrent problem  The current episode started yesterday  The problem occurs every urination  The problem has been unchanged  The quality of the pain is described as aching  The pain is at a severity of 6/10  The pain is moderate  There has been no fever  There is no history of pyelonephritis  Associated symptoms include flank pain, frequency and urgency  Pertinent negatives include no chills, hematuria, hesitancy, nausea, sweats or vomiting  Treatments tried: Ciprofloxacin  The treatment provided no relief         Past Medical History:   Diagnosis Date    Breast lump     last assessed 8/24/10    Hyperlipidemia     Myalgia     last assessed  1/29/15    Myositis     last assessed  1/29/15    Pernicious anemia        Family History   Problem Relation Age of Onset    Atrial fibrillation Mother     Breast cancer Mother     Hypertension Mother     Heart disease Father         arteriosclerotic    Hypertension Father     Aneurysm Sister         abdominal aortic    Heart disease Brother arteriosclerotic    Ovarian cancer Maternal Grandmother        Past Surgical History:   Procedure Laterality Date    CHOLECYSTECTOMY      GALLBLADDER SURGERY      HERNIA REPAIR      TUBAL LIGATION      URETHRAL SLING          reports that she has quit smoking  She has never used smokeless tobacco  She reports that she drinks alcohol  She reports that she does not use drugs  Current Outpatient Prescriptions:     coenzyme Q-10 100 MG capsule, Take by mouth, Disp: , Rfl:     esomeprazole (NexIUM) 40 MG capsule, Take 1 capsule (40 mg total) by mouth daily, Disp: 90 capsule, Rfl: 1    Omega-3 Fatty Acids (FISH OIL) 1,000 mg, Take by mouth, Disp: , Rfl:     Red Yeast Rice 600 MG CAPS, Take 1 capsule (600 mg total) by mouth daily in the early morning, Disp: , Rfl: 0    levofloxacin (LEVAQUIN) 500 mg tablet, Take 1 tablet (500 mg total) by mouth every 24 hours for 5 days, Disp: 5 tablet, Rfl: 0    The following portions of the patient's history were reviewed and updated as appropriate: allergies, current medications, past family history, past medical history, past social history, past surgical history and problem list     Review of Systems   Constitutional: Negative  Negative for chills  Respiratory: Negative  Negative for shortness of breath  Cardiovascular: Negative  Negative for chest pain and palpitations  Gastrointestinal: Negative for nausea and vomiting  Genitourinary: Positive for dysuria, flank pain, frequency and urgency  Negative for hematuria and hesitancy  Musculoskeletal: Negative for myalgias  Neurological: Negative  Psychiatric/Behavioral: Negative  Objective:    /70   Pulse 68   Temp (!) 97 °F (36 1 °C)   Ht 5' 3" (1 6 m)   Wt 89 4 kg (197 lb)   LMP 01/01/2016   SpO2 98%   BMI 34 90 kg/m²      Physical Exam   Constitutional: She is oriented to person, place, and time  She appears well-developed and well-nourished     Cardiovascular: Normal rate, regular rhythm and normal heart sounds  Pulmonary/Chest: Effort normal and breath sounds normal    Abdominal: Soft  Bowel sounds are normal  She exhibits no mass  There is no tenderness  There is no guarding  Neurological: She is alert and oriented to person, place, and time  Psychiatric: Her behavior is normal  Judgment normal          Recent Results (from the past 1008 hour(s))   UA w Reflex to Microscopic w Reflex to Culture    Collection Time: 07/11/18 11:21 AM   Result Value Ref Range    SL AMB COLOR, URINE YELLOW YELLOW    Urine Appearance CLEAR CLEAR    Specific Gravity 1 004 1 001 - 1 035    Ph 6 0 5 0 - 8 0    SL AMB GLUCOSE, URINE, QUAL  NEGATIVE NEGATIVE    SL AMB BILIRUBIN, URINE NEGATIVE NEGATIVE    SL AMB KETONE, URINE, QUAL  NEGATIVE NEGATIVE    SL AMB BLOOD, URINE NEGATIVE NEGATIVE    SL AMB PROTEIN, URINE, QUAL  NEGATIVE NEGATIVE    SL AMB NITRITES URINE, QUAL   NEGATIVE NEGATIVE    SL AMB LEUKOCYTE ESTERASE URINE TRACE (A) NEGATIVE    SL AMB WBC, URINE NONE SEEN < OR = 5 /HPF    SL AMB RBC, URINE NONE SEEN < OR = 2 /HPF    Squamous Epithelial Cells NONE SEEN < OR = 5 /HPF    Bacteria, UA NONE SEEN NONE SEEN /HPF    Hyaline Casts NONE SEEN NONE SEEN /LPF   Urine culture    Collection Time: 07/11/18 11:21 AM   Result Value Ref Range    Urine Culture, Comprehensive CULTURE INDICATED - RESULTS TO FOLLOW    Urine culture    Collection Time: 07/11/18 11:21 AM   Result Value Ref Range    SL AMB LAB URINE CULTURE RESULT (A)    Urine culture    Collection Time: 07/11/18 11:21 AM   Result Value Ref Range    Urine Culture, Comprehensive CULTURE INDICATED - RESULTS TO FOLLOW    Urine culture    Collection Time: 07/11/18 11:21 AM   Result Value Ref Range    SL AMB LAB URINE CULTURE RESULT (A)    Urine culture    Collection Time: 07/11/18 11:21 AM   Result Value Ref Range    Urine Culture, Comprehensive CULTURE INDICATED - RESULTS TO FOLLOW    Urine culture    Collection Time: 07/11/18 11:21 AM   Result Value Ref Range    SL AMB LAB URINE CULTURE RESULT (A)    POCT urine dip    Collection Time: 07/11/18 11:32 AM   Result Value Ref Range    LEUKOCYTE ESTERASE,UA 25      NITRITE,UA NEG     SL AMB POCT UROBILINOGEN NORM     SL AMB POCT URINE PROTEIN NEG      PH,UA 6      BLOOD,UA NEG      SPECIFIC GRAVITY,UA 1 010      KETONES,UA NEG      BILIRUBIN,UA NEG     GLUCOSE, UA NORM      COLOR,UA YELLOW      CLARITY,UA TRANSPARENT    CBC and differential    Collection Time: 07/11/18 12:01 PM   Result Value Ref Range    SL AMB LAB WHITE BLOOD CELL COUNT 5 6 3 4 - 10 8 x10E3/uL    SL AMB LAB RED BLOOD CELLS 4 86 3 77 - 5 28 x10E6/uL    Hemoglobin 14 2 11 1 - 15 9 g/dL    Hematocrit 40 8 34 0 - 46 6 %    MCV 84 79 - 97 fL    MCH 29 2 26 6 - 33 0 pg    MCHC 34 8 31 5 - 35 7 g/dL    RDW 13 8 12 3 - 15 4 %    Platelet Count 571 558 - 379 x10E3/uL    Neutrophils 60 Not Estab  %    Lymphocytes 29 Not Estab  %    Monocytes 9 Not Estab  %    Eosinophils 1 Not Estab  %    Basophils 1 Not Estab  %    Neutrophils (Absolute) 3 4 1 4 - 7 0 x10E3/uL    Lymphocytes (Absolute) 1 6 0 7 - 3 1 x10E3/uL    Monocytes (Absolute) 0 5 0 1 - 0 9 x10E3/uL    Eosinophils (Absolute) 0 1 0 0 - 0 4 x10E3/uL    Basophils (Absolute) 0 0 0 0 - 0 2 x10E3/uL    IMM  GRANULOCYTES (CD4/8) 0 Not Estab  %    IIMM  GRANS,ABS (CD4/8) 0 0 0 0 - 0 1 x10E3/uL   Comprehensive metabolic panel    Collection Time: 07/11/18 12:01 PM   Result Value Ref Range    SL AMB GLUCOSE 96 65 - 99 mg/dL    BUN 11 6 - 24 mg/dL    Creatinine, Serum 0 78 0 57 - 1 00 mg/dL    eGFR Non African American 85 >59 mL/min/1 73    SL AMB EGFR AFRICAN AMERICAN 98 >59 mL/min/1 73    SL AMB BUN/CREATININE RATIO 14 9 - 23    SL AMB SODIUM 139 134 - 144 mmol/L    SL AMB POTASSIUM 4 1 3 5 - 5 2 mmol/L    SL AMB CHLORIDE 101 96 - 106 mmol/L    SL AMB CARBON DIOXIDE 22 20 - 29 mmol/L    CALCIUM 9 6 8 7 - 10 2 mg/dL    SL AMB PROTEIN, TOTAL 6 9 6 0 - 8 5 g/dL    Serum Albumin 4 3 3 5 - 5 5 g/dL    Globulin, Total 2 6 1 5 - 4 5 g/dL    SL AMB ALBUMIN/GLOBULIN RATIO 1 7 1 2 - 2 2    SL AMB BILIRUBIN, TOTAL 0 6 0 0 - 1 2 mg/dL    Alk Phos Isoenzymes 98 39 - 117 IU/L    SL AMB AST 17 0 - 40 IU/L    SL AMB ALT 19 0 - 32 IU/L   Ambig Abbrev Default    Collection Time: 07/11/18 12:01 PM   Result Value Ref Range    AMBIG ABBREV DEFAULT Comment    GP Culture, Genital    Collection Time: 07/11/18  4:12 PM   Result Value Ref Range    SL AMB GENITAL CULTURE #1:HEAVY GROWTH O  (A) NORMAL ISABELLA   Stool Enteric Bacterial Panel by PCR    Collection Time: 07/12/18  7:00 AM   Result Value Ref Range    CAMPYLOBACTER Not Detected Not Detected    C DIFFICILE TOXIN A/B Not Detected Not Detected    PLESIOMONAS SHIGELLOIDES Not Detected Not Detected    SALMONELLA Detected (A) Not Detected    VIBRIO Not Detected Not Detected    VIBRIO CHOLERAE Not Detected Not Detected    YERSINIA ENTEROCOLITICA Not Detected Not Detected    ENTEROAGGREGATIVE E COLI Not Detected Not Detected    ENTEROPATHOGENIC E COLI Not Detected Not Detected    ENTEROTOXIGENIC E COLI Not Detected Not Detected    SHIGA-TOXIN-PRODUCING Not Detected Not Detected    E COLI 8002 Not applicable Not Detected    SHIGELLA/ENTEROINVASIVE E COLI Not Detected Not Detected    CRYPTOSPORIDIUM Not Detected Not Detected    CYCLOSPORA CAYETANENSIS Not Detected Not Detected    ENTAMOEBA HISTOLYTICA Not Detected Not Detected    GIARDIA LAMBLIA Not Detected Not Detected    ADENOVIRUS F 40/41 Not Detected Not Detected    ASTROVIRUS Not Detected Not Detected    NOROVIRUS GI/GII Not Detected Not Detected    ROTAVIRUS A Not Detected Not Detected    SAPOVIRUS Not Detected Not Detected   Clostridium difficile toxin by PCR    Collection Time: 07/12/18  7:00 AM   Result Value Ref Range    C Difficile Toxin Gene SHENG Negative Negative       Assessment/Plan:      Recurring symptoms of urinary discomfort in a patient who was recently diagnosed and treated for a urinary tract infection    Patient's urine dip in the office and was positive for nitrites  Change the antibiotics today based on the previous culture reports  urine will be sent  for culture sensitivity  I will call her back with the results  Diagnoses and all orders for this visit:    Urinary tract infection without hematuria, site unspecified  -     levofloxacin (LEVAQUIN) 500 mg tablet;  Take 1 tablet (500 mg total) by mouth every 24 hours for 5 days  -     POCT urine dip

## 2018-07-27 LAB
APPEARANCE UR: CLEAR
BACTERIA UR CULT: ABNORMAL
BACTERIA UR QL AUTO: ABNORMAL /HPF
BILIRUB UR QL STRIP: NEGATIVE
COLOR UR: YELLOW
GLUCOSE UR QL STRIP: NEGATIVE
HGB UR QL STRIP: NEGATIVE
HYALINE CASTS #/AREA URNS LPF: ABNORMAL /LPF
KETONES UR QL STRIP: NEGATIVE
LEUKOCYTE ESTERASE UR QL STRIP: NEGATIVE
NITRITE UR QL STRIP: POSITIVE
PH UR STRIP: 7.5 [PH] (ref 5–8)
PROT UR QL STRIP: NEGATIVE
RBC #/AREA URNS HPF: ABNORMAL /HPF
SP GR UR STRIP: 1.02 (ref 1–1.03)
SQUAMOUS #/AREA URNS HPF: ABNORMAL /HPF
WBC #/AREA URNS HPF: ABNORMAL /HPF

## 2018-07-30 ENCOUNTER — TELEPHONE (OUTPATIENT)
Dept: FAMILY MEDICINE CLINIC | Facility: CLINIC | Age: 58
End: 2018-07-30

## 2018-07-30 NOTE — TELEPHONE ENCOUNTER
Her urine does look much improved since being on floor quinolone  She was adequately treated with antibiotics  The name of the chiropractor is Dr Andrew Izquierdo  His contact information can be provided  I do not have the images from the MRI that she had done at [address] [place] [address]/On license of [facility]  They do not send over images  They only send over reports    She would have to go to the MRI center of [place] and request her images be burned onto a CD ROM 92

## 2018-07-30 NOTE — TELEPHONE ENCOUNTER
Pt would like to know what exactly the urine showed, did it still show a UTI because pt is still having the frequency of urination and urgency as well as the burning  She states the medicine was making her sick so she did stop it two days early  Also she would like the MRI report sent to Dr Cecilia Wahl  I do not see it in her chart

## 2018-07-30 NOTE — TELEPHONE ENCOUNTER
Patient called in looking for the results of her urine  She would like a call back   She had them done 7/26

## 2018-07-30 NOTE — TELEPHONE ENCOUNTER
Patient came to the window to get the information of the chiropractor that doctor renee recommended- she said it was his personal chiropractor  She also wanted the images from her MRI that she had done at the [facility]  I did not see any of this in her chart  I gave her a print out of the last office note  She would like her records faxed over to this chiropractor once we get that information for her  She also has been waiting all afternoon for a call back regarding her urine sample

## 2018-07-31 NOTE — TELEPHONE ENCOUNTER
She had nitrites in her urine and that was all  I do not see her MRI which has noty been scanned in  It is somewhere with central scanning   Please call for MRI report and it can be sent to Dr Hazel Mason but if she hasn't scheduled an appt with him and we send it over then he will probably throw it out because she is not an established patient

## 2018-07-31 NOTE — TELEPHONE ENCOUNTER
CAN NITRITES IN HER URINE BE THE CAUSE OF HER ISSUES? PATIENT IS WONDERING WHAT TO DO IN REGARDS TO HER URINARY SYMPTOMS; SHE STATES SHE IS CONSTANTLY HAVING THE URINARY FREQUENCY AND A BURNING SENSATION WHEN SHE URINATES BUT ALSO JUST IN GENERAL  SHE FEELS VERY UNCOMFORTABLE  NO DISCHARGE, REDNESS, OR ITCHINESS IN THE AREA  SHE HAS TRIED AZO WHICH SHE STATES HELPED A TAD BUT AFTER THE 2 DAYS OF USAGE IT WAS BACK AND WORSE THEN BEFORE  SHE HAS BEEN USING COCONUT OIL WHICH SHE SAYS SOOTHES SOME OF THE DISCOMFORT FOR A SHORT PERIOD OF TIME     PLEASE ADVISE

## 2018-08-01 DIAGNOSIS — N39.0 URINARY TRACT INFECTION WITHOUT HEMATURIA, SITE UNSPECIFIED: ICD-10-CM

## 2018-08-01 DIAGNOSIS — R35.0 URINARY FREQUENCY: Primary | ICD-10-CM

## 2018-08-01 NOTE — TELEPHONE ENCOUNTER
No, nitrites would not cause urinary discomfort    With her ongoing urinary frequency I would like for her to see a urologist    Order for specialist provided

## 2018-08-06 ENCOUNTER — OFFICE VISIT (OUTPATIENT)
Dept: UROLOGY | Facility: MEDICAL CENTER | Age: 58
End: 2018-08-06
Payer: COMMERCIAL

## 2018-08-06 VITALS
DIASTOLIC BLOOD PRESSURE: 70 MMHG | WEIGHT: 196 LBS | SYSTOLIC BLOOD PRESSURE: 122 MMHG | HEIGHT: 63 IN | BODY MASS INDEX: 34.73 KG/M2

## 2018-08-06 DIAGNOSIS — R35.0 URINARY FREQUENCY: ICD-10-CM

## 2018-08-06 DIAGNOSIS — N30.00 ACUTE CYSTITIS WITHOUT HEMATURIA: ICD-10-CM

## 2018-08-06 DIAGNOSIS — R10.2 PELVIC PAIN: Primary | ICD-10-CM

## 2018-08-06 LAB
SL AMB  POCT GLUCOSE, UA: NORMAL
SL AMB LEUKOCYTE ESTERASE,UA: NORMAL
SL AMB POCT BILIRUBIN,UA: NORMAL
SL AMB POCT BLOOD,UA: NORMAL
SL AMB POCT CLARITY,UA: CLEAR
SL AMB POCT COLOR,UA: YELLOW
SL AMB POCT KETONES,UA: NORMAL
SL AMB POCT NITRITE,UA: NORMAL
SL AMB POCT PH,UA: 6.5
SL AMB POCT SPECIFIC GRAVITY,UA: 1.01
SL AMB POCT URINE PROTEIN: NORMAL
SL AMB POCT UROBILINOGEN: 0.2

## 2018-08-06 PROCEDURE — 81003 URINALYSIS AUTO W/O SCOPE: CPT | Performed by: UROLOGY

## 2018-08-06 PROCEDURE — 99244 OFF/OP CNSLTJ NEW/EST MOD 40: CPT | Performed by: UROLOGY

## 2018-08-06 PROCEDURE — 87086 URINE CULTURE/COLONY COUNT: CPT | Performed by: UROLOGY

## 2018-08-06 RX ORDER — TOLTERODINE TARTRATE 2 MG/1
2 TABLET, EXTENDED RELEASE ORAL DAILY
Qty: 10 TABLET | Refills: 0 | Status: SHIPPED | OUTPATIENT
Start: 2018-08-06 | End: 2018-10-29

## 2018-08-06 NOTE — PROGRESS NOTES
Assessment/Plan:  1  I suspect the UTI is cleared, but she has persistent inflammation of the bladder causing prolonged symptoms  Culture taken, results in 2-3 days  2   She is worried about frequency while she is away on vacation next week, short course of Detrol given    3  Renal ultrasound as well if symptoms persist     4   Follow-up one month  No problem-specific Assessment & Plan notes found for this encounter  Diagnoses and all orders for this visit:    Pelvic pain    Urinary frequency  -     Ambulatory referral to Urology  -     POCT urine dip auto non-scope  -     tolterodine (DETROL) 2 mg tablet; Take 1 tablet (2 mg total) by mouth daily    Acute cystitis without hematuria  -     Ambulatory referral to Urology  -     POCT urine dip auto non-scope  -     Urine culture          Subjective:      Patient ID: Juan M Fowler is a 62 y o  female  Persistent dysuria, pelvic discomfort, frequency urgency after treatment for E coli UTI  For different antibiotics, including Macrobid, Bactrim, Cipro, Levaquin  Symptoms are less than but not completely resolved  Also bothered by itching around the skin perivaginal and perianal     This is her first infection in many years, no history of stones or obstruction        The following portions of the patient's history were reviewed and updated as appropriate: allergies, current medications, past family history, past medical history, past social history, past surgical history and problem list     Review of Systems   All other systems reviewed and are negative  Objective:      /70   Ht 5' 3" (1 6 m)   Wt 88 9 kg (196 lb)   LMP 01/01/2016   BMI 34 72 kg/m²          Physical Exam   Constitutional: She is oriented to person, place, and time  She appears well-developed and well-nourished  No distress  HENT:   Head: Normocephalic and atraumatic  Eyes: Conjunctivae are normal    Cardiovascular: Normal rate and regular rhythm  Pulmonary/Chest: Effort normal and breath sounds normal  No respiratory distress  She has no wheezes  Abdominal: Soft  Bowel sounds are normal  She exhibits no distension and no mass  There is no tenderness  Minimal tenderness deep palpation suprapubic    Vaginal exam, perianal skin unremarkable  Cath urine taken   Neurological: She is alert and oriented to person, place, and time  Skin: Skin is warm and dry  She is not diaphoretic

## 2018-08-06 NOTE — LETTER
August 6, 2018     Khang Ordoñez DO  [address]  [facility] [address]    Patient: Jannette Moore   YOB: 1960   Date of Visit: 8/6/2018       Dear Dr Sandra Dasilva:    Thank you for referring Theresa Hua to me for evaluation  Below are my notes for this consultation  If you have questions, please do not hesitate to call me  I look forward to following your patient along with you  Sincerely,        Alaina Del Valle MD        CC: No Recipients  Alaina Del Valle MD  8/6/2018  3:28 PM  Sign at close encounter  Assessment/Plan:  1  I suspect the UTI is cleared, but she has persistent inflammation of the bladder causing prolonged symptoms  Culture taken, results in 2-3 days  2   She is worried about frequency while she is away on vacation next week, short course of Detrol given    3  Renal ultrasound as well if symptoms persist     4   Follow-up one month  No problem-specific Assessment & Plan notes found for this encounter  Diagnoses and all orders for this visit:    Pelvic pain    Urinary frequency  -     Ambulatory referral to Urology  -     POCT urine dip auto non-scope  -     tolterodine (DETROL) 2 mg tablet; Take 1 tablet (2 mg total) by mouth daily    Acute cystitis without hematuria  -     Ambulatory referral to Urology  -     POCT urine dip auto non-scope  -     Urine culture          Subjective:      Patient ID: Zachary Wan is a 62 y o  female  Persistent dysuria, pelvic discomfort, frequency urgency after treatment for E coli UTI  For different antibiotics, including Macrobid, Bactrim, Cipro, Levaquin  Symptoms are less than but not completely resolved      Also bothered by itching around the skin perivaginal and perianal     This is her first infection in many years, no history of stones or obstruction        The following portions of the patient's history were reviewed and updated as appropriate: allergies, current medications, past family history, past medical history, past social history, past surgical history and problem list     Review of Systems   All other systems reviewed and are negative  Objective:      /70   Ht 5' 3" (1 6 m)   Wt 88 9 kg (196 lb)   LMP 01/01/2016   BMI 34 72 kg/m²           Physical Exam   Constitutional: She is oriented to person, place, and time  She appears well-developed and well-nourished  No distress  HENT:   Head: Normocephalic and atraumatic  Eyes: Conjunctivae are normal    Cardiovascular: Normal rate and regular rhythm  Pulmonary/Chest: Effort normal and breath sounds normal  No respiratory distress  She has no wheezes  Abdominal: Soft  Bowel sounds are normal  She exhibits no distension and no mass  There is no tenderness  Minimal tenderness deep palpation suprapubic    Vaginal exam, perianal skin unremarkable  Cath urine taken   Neurological: She is alert and oriented to person, place, and time  Skin: Skin is warm and dry  She is not diaphoretic

## 2018-08-07 LAB — BACTERIA UR CULT: NORMAL

## 2018-08-08 ENCOUNTER — TELEPHONE (OUTPATIENT)
Dept: UROLOGY | Facility: MEDICAL CENTER | Age: 58
End: 2018-08-08

## 2018-08-08 NOTE — TELEPHONE ENCOUNTER
Patient's urine culture came back negative  Patient states that she's still experiencing bladder pressure and some burning with urination that gets worse as the day goes on  I'll relay this to Dr Marcus Nuñez and see what he would like done for her

## 2018-08-08 NOTE — TELEPHONE ENCOUNTER
I discussed the patient's symptoms with Dr Felicia Polanco  When confirming with the patient if she was taking the Detrol he prescribed, she stated that she was not  She should start taking it once per day  For the burning, he recommends an over-the-counter Azo, which the patient states she already has on-hand  She is to take this once a day as well  The patient confirms that she will start these medications and will call back if she does not experience relief

## 2018-09-10 ENCOUNTER — OFFICE VISIT (OUTPATIENT)
Dept: PODIATRY | Facility: CLINIC | Age: 58
End: 2018-09-10
Payer: COMMERCIAL

## 2018-09-10 VITALS
HEIGHT: 63 IN | BODY MASS INDEX: 34.73 KG/M2 | WEIGHT: 196 LBS | HEART RATE: 85 BPM | DIASTOLIC BLOOD PRESSURE: 85 MMHG | SYSTOLIC BLOOD PRESSURE: 135 MMHG | RESPIRATION RATE: 16 BRPM

## 2018-09-10 DIAGNOSIS — Q66.52 CONGENITAL PES PLANUS OF LEFT FOOT: ICD-10-CM

## 2018-09-10 DIAGNOSIS — Q66.51 CONGENITAL PES PLANUS OF RIGHT FOOT: ICD-10-CM

## 2018-09-10 DIAGNOSIS — M54.16 RADICULOPATHY OF LUMBAR REGION: ICD-10-CM

## 2018-09-10 DIAGNOSIS — M79.672 PAIN IN BOTH FEET: ICD-10-CM

## 2018-09-10 DIAGNOSIS — M76.61 ACHILLES TENDINITIS OF RIGHT LOWER EXTREMITY: Primary | ICD-10-CM

## 2018-09-10 DIAGNOSIS — M21.969 ACQUIRED DEFORMITY OF FOOT, UNSPECIFIED LATERALITY: ICD-10-CM

## 2018-09-10 DIAGNOSIS — M79.671 PAIN IN BOTH FEET: ICD-10-CM

## 2018-09-10 PROCEDURE — 20551 NJX 1 TENDON ORIGIN/INSJ: CPT | Performed by: PODIATRIST

## 2018-09-10 PROCEDURE — L3000 FT INSERT UCB BERKELEY SHELL: HCPCS | Performed by: PODIATRIST

## 2018-09-10 PROCEDURE — 99243 OFF/OP CNSLTJ NEW/EST LOW 30: CPT | Performed by: PODIATRIST

## 2018-09-10 RX ORDER — PREGABALIN 75 MG/1
75 CAPSULE ORAL 2 TIMES DAILY
Qty: 60 CAPSULE | Refills: 0 | Status: SHIPPED | OUTPATIENT
Start: 2018-09-10 | End: 2018-10-04 | Stop reason: SINTOL

## 2018-09-10 NOTE — PROGRESS NOTES
Assessment/Plan:  Radiculopathy  Rule out neuropathy  Rule out Lyme disease  Achilles tendinitis  Pes planus  Pain  Plan  Trigger point injection done  Into the area of the right Achilles tendon insertion, 1 cc of dexamethasone phosphate injected without pain or complication  We will add Lyrica  Patient's feet have been casted for custom molded foot orthotics  Blood work is being ordered to rule out sural positive arthropathy  No problem-specific Assessment & Plan notes found for this encounter  There are no diagnoses linked to this encounter  Subjective:   Patient is seen on referral   Patient has pain in her right foot  She also complains of pain and numbness in her feet that occur at the end of the day  No history of trauma    Patient has chronic low back pain    Past Medical History:   Diagnosis Date    Breast lump     last assessed 8/24/10    Hyperlipidemia     Myalgia     last assessed  1/29/15    Myositis     last assessed  1/29/15    Pernicious anemia        Past Surgical History:   Procedure Laterality Date    CHOLECYSTECTOMY      GALLBLADDER SURGERY      HERNIA REPAIR      TUBAL LIGATION      URETHRAL SLING         No Known Allergies      Current Outpatient Prescriptions:     coenzyme Q-10 100 MG capsule, Take by mouth, Disp: , Rfl:     esomeprazole (NexIUM) 40 MG capsule, Take 1 capsule (40 mg total) by mouth daily, Disp: 90 capsule, Rfl: 1    Omega-3 Fatty Acids (FISH OIL) 1,000 mg, Take by mouth, Disp: , Rfl:     Red Yeast Rice 600 MG CAPS, Take 1 capsule (600 mg total) by mouth daily in the early morning, Disp: , Rfl: 0    tolterodine (DETROL) 2 mg tablet, Take 1 tablet (2 mg total) by mouth daily, Disp: 10 tablet, Rfl: 0    Patient Active Problem List   Diagnosis    Neck pain    Esophageal reflux    Gastritis    Hernia, hiatal    Hyperlipidemia    Low back pain    Meralgia paresthetica, left lower limb    Varicose veins of right lower extremity with pain    Breast cancer screening    Diarrhea    Urinary frequency    Vaginal irritation    Vaginal itching    Urinary tract infection without hematuria    Pelvic pain          Patient ID: Estrella Almendarez is a 62 y o  female  HPI    The following portions of the patient's history were reviewed and updated as appropriate: allergies, current medications, past family history, past medical history, past social history, past surgical history and problem list     Review of Systems      Objective:  Patient's shoes and socks removed  Foot Exam    General  General Appearance: appears stated age and healthy   Orientation: alert and oriented to person, place, and time   Affect: appropriate   Gait: antalgic       Right Foot/Ankle     Inspection and Palpation  Ecchymosis: none  Tenderness: bony tenderness   Swelling: metatarsals   Arch: pes planus  Hammertoes: fifth toe  Hallux valgus: no      Left Foot/Ankle      Inspection and Palpation  Ecchymosis: none  Tenderness: bony tenderness   Swelling: metatarsals   Arch: pes planus  Hammertoes: fifth toe  Hallux valgus: no  Hallux limitus: yes        Physical Exam   Constitutional: She appears well-developed and well-nourished  Cardiovascular: Normal rate and regular rhythm  Musculoskeletal:        Right foot: There is bony tenderness  Left foot: There is bony tenderness  Patient is pronated in stance and gait  Patient has retrocalcaneal exostosis of right foot  Neurological:   4/5 L5 testing

## 2018-09-19 LAB
ANA SER QL: NEGATIVE
B BURGDOR IGG+IGM SER-ACNC: <0.91 ISR (ref 0–0.9)
ERYTHROCYTE [SEDIMENTATION RATE] IN BLOOD BY WESTERGREN METHOD: 2 MM/HR (ref 0–40)
RHEUMATOID FACT SERPL-ACNC: 10.7 IU/ML (ref 0–13.9)

## 2018-10-04 ENCOUNTER — OFFICE VISIT (OUTPATIENT)
Dept: PODIATRY | Facility: CLINIC | Age: 58
End: 2018-10-04
Payer: COMMERCIAL

## 2018-10-04 VITALS
DIASTOLIC BLOOD PRESSURE: 80 MMHG | WEIGHT: 196 LBS | BODY MASS INDEX: 34.73 KG/M2 | SYSTOLIC BLOOD PRESSURE: 132 MMHG | HEIGHT: 63 IN | RESPIRATION RATE: 16 BRPM | HEART RATE: 87 BPM

## 2018-10-04 DIAGNOSIS — M79.672 PAIN IN BOTH FEET: ICD-10-CM

## 2018-10-04 DIAGNOSIS — M54.16 RADICULOPATHY OF LUMBAR REGION: Primary | ICD-10-CM

## 2018-10-04 DIAGNOSIS — M76.61 ACHILLES TENDINITIS OF RIGHT LOWER EXTREMITY: ICD-10-CM

## 2018-10-04 DIAGNOSIS — M21.961 ACQUIRED DEFORMITY OF RIGHT FOOT: ICD-10-CM

## 2018-10-04 DIAGNOSIS — M79.671 PAIN IN BOTH FEET: ICD-10-CM

## 2018-10-04 PROCEDURE — 20551 NJX 1 TENDON ORIGIN/INSJ: CPT | Performed by: PODIATRIST

## 2018-10-04 PROCEDURE — 99212 OFFICE O/P EST SF 10 MIN: CPT | Performed by: PODIATRIST

## 2018-10-04 RX ORDER — DULOXETIN HYDROCHLORIDE 30 MG/1
30 CAPSULE, DELAYED RELEASE ORAL DAILY
Qty: 30 CAPSULE | Refills: 0 | Status: SHIPPED | OUTPATIENT
Start: 2018-10-04 | End: 2018-10-08

## 2018-10-04 NOTE — PROGRESS NOTES
Procedures   Foot Exam       Signed  Encounter Date: 9/10/2018   Assessment/Plan:  Radiculopathy  Rule out neuropathy  Rule out Lyme disease  Achilles tendinitis  Pes planus  Pain      Plan  Trigger point injection done  Into the area of the right Achilles tendon insertion, 1 cc of dexamethasone phosphate injected without pain or complication  We will add Cymbalta  Patient's feet have been casted for custom molded foot orthotics  Blood work is being ordered to rule out sural positive arthropathy      No problem-specific Assessment & Plan notes found for this encounter          There are no diagnoses linked to this encounter        Subjective:   Patient has pain in her right foot  She also complains of pain and numbness in her feet that occur at the end of the day  No history of trauma  Patient has chronic low back pain  She has not seen chiropractic as of yet    She was unable to tolerate Lyrica           Past Medical History:   Diagnosis Date    Breast lump       last assessed 8/24/10    Hyperlipidemia      Myalgia       last assessed  1/29/15    Myositis       last assessed  1/29/15    Pernicious anemia                 Past Surgical History:   Procedure Laterality Date    CHOLECYSTECTOMY        GALLBLADDER SURGERY        HERNIA REPAIR        TUBAL LIGATION        URETHRAL SLING             No Known Allergies        Current Outpatient Prescriptions:     coenzyme Q-10 100 MG capsule, Take by mouth, Disp: , Rfl:     esomeprazole (NexIUM) 40 MG capsule, Take 1 capsule (40 mg total) by mouth daily, Disp: 90 capsule, Rfl: 1    Omega-3 Fatty Acids (FISH OIL) 1,000 mg, Take by mouth, Disp: , Rfl:     Red Yeast Rice 600 MG CAPS, Take 1 capsule (600 mg total) by mouth daily in the early morning, Disp: , Rfl: 0    tolterodine (DETROL) 2 mg tablet, Take 1 tablet (2 mg total) by mouth daily, Disp: 10 tablet, Rfl: 0         Patient Active Problem List   Diagnosis    Neck pain    Esophageal reflux  Gastritis    Hernia, hiatal    Hyperlipidemia    Low back pain    Meralgia paresthetica, left lower limb    Varicose veins of right lower extremity with pain    Breast cancer screening    Diarrhea    Urinary frequency    Vaginal irritation    Vaginal itching    Urinary tract infection without hematuria    Pelvic pain             Patient ID: Bonita Covarrubias is a 62 y o  female      HPI     The following portions of the patient's history were reviewed and updated as appropriate: allergies, current medications, past family history, past medical history, past social history, past surgical history and problem list      Review of Systems       Objective:  Patient's shoes and socks removed  Foot Exam     General  General Appearance: appears stated age and healthy   Orientation: alert and oriented to person, place, and time   Affect: appropriate   Gait: antalgic         Right Foot/Ankle      Inspection and Palpation  Ecchymosis: none  Tenderness: bony tenderness   Swelling: metatarsals   Arch: pes planus  Hammertoes: fifth toe  Hallux valgus: no        Left Foot/Ankle       Inspection and Palpation  Ecchymosis: none  Tenderness: bony tenderness   Swelling: metatarsals   Arch: pes planus  Hammertoes: fifth toe  Hallux valgus: no  Hallux limitus: yes        Physical Exam   Constitutional: She appears well-developed and well-nourished  Cardiovascular: Normal rate and regular rhythm  Musculoskeletal:        Right foot: There is bony tenderness  Left foot: There is bony tenderness  Patient is pronated in stance and gait  Patient has retrocalcaneal exostosis of right foot  Neurological:   4/5 L5 testing

## 2018-10-08 ENCOUNTER — ANNUAL EXAM (OUTPATIENT)
Dept: OBGYN CLINIC | Facility: CLINIC | Age: 58
End: 2018-10-08
Payer: COMMERCIAL

## 2018-10-08 VITALS
SYSTOLIC BLOOD PRESSURE: 118 MMHG | DIASTOLIC BLOOD PRESSURE: 70 MMHG | HEIGHT: 64 IN | BODY MASS INDEX: 33.63 KG/M2 | WEIGHT: 197 LBS

## 2018-10-08 DIAGNOSIS — Z01.419 ENCOUNTER FOR GYNECOLOGICAL EXAMINATION (GENERAL) (ROUTINE) WITHOUT ABNORMAL FINDINGS: Primary | ICD-10-CM

## 2018-10-08 DIAGNOSIS — Z78.0 MENOPAUSE: ICD-10-CM

## 2018-10-08 DIAGNOSIS — Z13.820 SCREENING FOR OSTEOPOROSIS: ICD-10-CM

## 2018-10-08 DIAGNOSIS — N76.0 VULVOVAGINITIS: ICD-10-CM

## 2018-10-08 DIAGNOSIS — Z12.31 ENCOUNTER FOR SCREENING MAMMOGRAM FOR MALIGNANT NEOPLASM OF BREAST: ICD-10-CM

## 2018-10-08 PROBLEM — N89.8 VAGINAL IRRITATION: Status: RESOLVED | Noted: 2018-07-11 | Resolved: 2018-10-08

## 2018-10-08 PROBLEM — R10.2 PELVIC PAIN: Status: RESOLVED | Noted: 2018-08-06 | Resolved: 2018-10-08

## 2018-10-08 PROBLEM — Z12.39 BREAST CANCER SCREENING: Status: RESOLVED | Noted: 2018-05-07 | Resolved: 2018-10-08

## 2018-10-08 PROBLEM — N89.8 VAGINAL ITCHING: Status: RESOLVED | Noted: 2018-07-11 | Resolved: 2018-10-08

## 2018-10-08 PROCEDURE — S0612 ANNUAL GYNECOLOGICAL EXAMINA: HCPCS | Performed by: PHYSICIAN ASSISTANT

## 2018-10-08 RX ORDER — CLOTRIMAZOLE AND BETAMETHASONE DIPROPIONATE 10; .64 MG/G; MG/G
CREAM TOPICAL 2 TIMES DAILY
Qty: 45 G | Refills: 0 | Status: SHIPPED | OUTPATIENT
Start: 2018-10-08 | End: 2019-05-29

## 2018-10-08 NOTE — PATIENT INSTRUCTIONS
Encourage at least 1200 mg calcium citrate + 2000 IUs vitamin D3 divided through diet and supplement throughout the day; Encourage 30-40 min weight bearing exercise most days of week  Apply lotrisone to affected area twice a day for 7-10 day or up to 14 days  Do not use continuously because steroid cream can think out the skin  Apply OTC antifungal powder to the affected area prn for maintenance to prevent genital itching

## 2018-10-08 NOTE — ASSESSMENT & PLAN NOTE
The current ASCCP guidelines were reviewed  Patient's last pap was 8/17/16 - WNL (-) HRHPV type 16/18 negative and therefore, a pap with HPV cotesting is not indicated at this time  I emphasized the importance of an annual pelvic and breast exam  Patient ok to defer pap today

## 2018-10-08 NOTE — PROGRESS NOTES
Assessment/Plan   Diagnoses and all orders for this visit:    Encounter for gynecological examination (general) (routine) without abnormal findings  The current ASCCP guidelines were reviewed  Patient's last pap was 8/17/16 - WNL (-) HRHPV type 16/18 negative and therefore, a pap with HPV cotesting is not indicated at this time  I emphasized the importance of an annual pelvic and breast exam  Patient ok to defer pap today  Vulvovaginitis  -     clotrimazole-betamethasone (LOTRISONE) 1-0 05 % cream; Apply topically 2 (two) times a day  -     GP Culture, Genital  Genital culture done for follow-up to E  Coli in vagina over the summer  Advise patient to apply lotrisone cream BID to the affected area x 7-10 days or up to 14 days  Patient to not use longer than 14 days since chronic use of a steroid cream can cause skin atrophy  Reviewed can hold off on premarin vaginal at this time if uncomfortable using and will trial lotrisone at this time  If no resolution of symptoms after lotrisone treatment and genital culture WNL - will recommend restart of premarin vaginal 1/2 applicatorful PV [id number]/OGVF  Patient to call or RTO if symptoms persist or worsen  Encounter for screening mammogram for malignant neoplasm of breast  -     Mammo screening bilateral w 3d & cad; Future  A yearly mammogram is recommended for breast cancer screening starting at age 36;    Screening for osteoporosis  -     DXA bone density spine hip and pelvis; Future  Menopause  -     DXA bone density spine hip and pelvis; Future  I have reviewed the patient's risk factors for osteoporosis and ordered a dexa scan if applicable  Pt to check with insurance for coverage    Discussion  I have discussed the importance of monthly self-breast exams, exercise and healthy diet as well as adequate intake of calcium and vitamin D   Encourage at least 1200 mg calcium citrate + 2000 IUs vitamin D3 divided through diet and supplement throughout the day; Encourage 30-40 min weight bearing exercise most days of week  STD testing - declines  In addition, colon cancer screening with a colonoscopy is recommended starting at age 39 and reviewed benefits - patient is up to date  Briefly reviewed vasomotor symptoms in menopause and treatment options  Encourage trial of increasing soy in diet  Can consider SSRI vs HRT in the future if needed/desires  All questions have been answered to her satisfaction  RTO for APE or sooner if needed    Subjective     HPI   Cheryle Elm is a 62 y o  female who presents for annual well woman exam    LMP - 12/2016; Denies  bleeding  (+) vulvar itch/burn - symptomatic mostly at work -  - a lot of rubbing and sweating; No vaginal itch/burn; No abn discharge or odor; (+) urinary sx - burning andpain prior to urination and once she is going - asymptomatic/increased frequency; denies hematuria; Over summer, patient had a salmonella infection and E coli of vagina and urine - treated, but symptoms of vulvar irritation still present  Bowels back to normal  Followed up with MD who performed sling procedure who prescribed a premarin vaginal cream which patient has been using 1 applicatorful once/week - has noticed some relief; Directed to use twice a week, but only using once/week - gets a vaginal discharge now  (-) SBEs - no asymmetry, nipple discharge or bleeding, changes in skin of breast, or breast tenderness bilaterally  No abd/pelvic pain or HAs;   (+) menopausal symptoms: (+) hot flashes - about 20/day - has tried black cohosh in the past with no relief; denies night sweats, no problems with intercourse - doesn't occur due to male issues; possible vaginal dryness; sleeping well for most part - stress waking her up  Pt is not currently sexually active - in a mutually monog/ sexual relationship; No issues with intercourse - doesn't occur due to male issues;  She declines std/hiv/hep testing; Feels safe at home  (+) PCP for routine Bw/care; Last Pap - 16 - WNL (-) HRHPV type  negative  History of abnormal Pap smear: no  Last mammo - 18 - WNL through PCP  History of abnormal mammogram: no  Last colonoscopy -  - WNL with polyps - follow-up in 5 years  Last Dexa scan - none    Review of Systems   Constitutional: Negative for activity change, fatigue, fever and unexpected weight change  HENT: Negative for congestion, dental problem, sinus pain and sinus pressure  Eyes: Negative for visual disturbance  Respiratory: Negative for cough, shortness of breath and wheezing  Cardiovascular: Negative for chest pain and leg swelling  Gastrointestinal: Negative for abdominal distention, abdominal pain, blood in stool, constipation, diarrhea, nausea and vomiting  Endocrine: Negative for polydipsia  Genitourinary: Positive for frequency  Negative for difficulty urinating, dyspareunia, dysuria, hematuria, menstrual problem, pelvic pain, urgency, vaginal bleeding, vaginal discharge and vaginal pain  Musculoskeletal: Positive for back pain  Negative for arthralgias  Allergic/Immunologic: Negative for environmental allergies  Neurological: Negative for dizziness, seizures and headaches  Psychiatric/Behavioral: Negative for dysphoric mood and sleep disturbance  The patient is not nervous/anxious          The following portions of the patient's history were reviewed and updated as appropriate: allergies, current medications, past family history, past medical history, past social history, past surgical history and problem list          OB History      Para Term  AB Living    3 3            SAB TAB Ectopic Multiple Live Births                     Obstetric Comments    : 46  M;   F;   F; Hx of cercalge x 3          Past Medical History:   Diagnosis Date    Breast lump     last assessed 8/24/10    GERD (gastroesophageal reflux disease)     Hyperlipidemia     Myalgia     last assessed  1/29/15    Myositis     last assessed  1/29/15    Pernicious anemia        Past Surgical History:   Procedure Laterality Date    CHOLECYSTECTOMY      GALLBLADDER SURGERY      HERNIA REPAIR      TUBAL LIGATION      URETHRAL SLING         Family History   Problem Relation Age of Onset    Atrial fibrillation Mother     Breast cancer Mother     Hypertension Mother     Hypertension Father     Aneurysm Sister         abdominal aortic    Cancer Maternal Grandmother        Social History     Social History    Marital status: /Civil Union     Spouse name: N/A    Number of children: N/A    Years of education: N/A     Occupational History          Social History Main Topics    Smoking status: Former Smoker    Smokeless tobacco: Never Used      Comment: Quit at age 32    Alcohol use Yes      Comment: social    Drug use: No    Sexual activity: Not Currently     Partners: Male     Birth control/ protection: Female Sterilization, Post-menopausal     Other Topics Concern    Not on file     Social History Narrative    Denied hx of domestic violence         Current Outpatient Prescriptions:     coenzyme Q-10 100 MG capsule, Take by mouth, Disp: , Rfl:     conjugated estrogens (PREMARIN) vaginal cream, Insert into the vagina daily, Disp: , Rfl:     esomeprazole (NexIUM) 40 MG capsule, Take 1 capsule (40 mg total) by mouth daily, Disp: 90 capsule, Rfl: 1    Omega-3 Fatty Acids (FISH OIL) 1,000 mg, Take by mouth, Disp: , Rfl:     Red Yeast Rice 600 MG CAPS, Take 1 capsule (600 mg total) by mouth daily in the early morning, Disp: , Rfl: 0    clotrimazole-betamethasone (LOTRISONE) 1-0 05 % cream, Apply topically 2 (two) times a day, Disp: 45 g, Rfl: 0    tolterodine (DETROL) 2 mg tablet, Take 1 tablet (2 mg total) by mouth daily (Patient not taking: Reported on 10/8/2018 ), Disp: 10 tablet, Rfl: 0    No Known Allergies    Objective   Vitals:    10/08/18 0952   BP: 118/70   BP Location: Left arm   Patient Position: Sitting   Cuff Size: Large   Weight: 89 4 kg (197 lb)   Height: 5' 3 5" (1 613 m)     Physical Exam   Constitutional: She appears well-developed and well-nourished  No distress  Neck: No thyromegaly present  Cardiovascular: Normal rate, regular rhythm and normal heart sounds  No murmur heard  Pulmonary/Chest: Effort normal and breath sounds normal  No respiratory distress  She has no wheezes  Right breast exhibits no inverted nipple, no mass, no nipple discharge, no skin change and no tenderness  Left breast exhibits no inverted nipple, no mass, no nipple discharge, no skin change and no tenderness  Breasts are symmetrical    Abdominal: Soft  She exhibits no distension and no mass  There is no tenderness  Genitourinary: Vagina normal and uterus normal  Rectal exam shows no mass, no tenderness, anal tone normal and guaiac negative stool  Pelvic exam was performed with patient supine  There is rash on the right labia  There is no tenderness or lesion on the right labia  There is rash on the left labia  There is no tenderness or lesion on the left labia  Uterus is not deviated, not enlarged, not fixed and not tender  Cervix exhibits no motion tenderness, no discharge and no friability  Right adnexum displays no mass, no tenderness and no fullness  Left adnexum displays no mass, no tenderness and no fullness  No erythema, tenderness or bleeding in the vagina  No foreign body in the vagina  No vaginal discharge found  Genitourinary Comments: (+) erythema of vulva down perineum and around anal opening - clearly demarcated with some possible satellite lesions   Musculoskeletal: She exhibits no edema  Lymphadenopathy:     She has no cervical adenopathy  She has no axillary adenopathy  Right: No inguinal adenopathy present  Left: No inguinal adenopathy present  Neurological: She is alert  Skin: Skin is warm  She is not diaphoretic     Psychiatric: She has a normal mood and affect  Her behavior is normal    Vitals reviewed

## 2018-10-12 LAB — SL AMB GENITAL CULTURE: ABNORMAL

## 2018-10-17 LAB
ALBUMIN SERPL-MCNC: 4.1 G/DL (ref 3.5–5.5)
ALBUMIN/GLOB SERPL: 1.5 {RATIO} (ref 1.2–2.2)
ALP SERPL-CCNC: 106 IU/L (ref 39–117)
ALT SERPL-CCNC: 15 IU/L (ref 0–32)
AST SERPL-CCNC: 16 IU/L (ref 0–40)
BASOPHILS # BLD AUTO: 0 X10E3/UL (ref 0–0.2)
BASOPHILS NFR BLD AUTO: 1 %
BILIRUB SERPL-MCNC: 0.4 MG/DL (ref 0–1.2)
BUN SERPL-MCNC: 16 MG/DL (ref 6–24)
BUN/CREAT SERPL: 21 (ref 9–23)
CALCIUM SERPL-MCNC: 9.3 MG/DL (ref 8.7–10.2)
CHLORIDE SERPL-SCNC: 100 MMOL/L (ref 96–106)
CHOLEST SERPL-MCNC: 236 MG/DL (ref 100–199)
CO2 SERPL-SCNC: 23 MMOL/L (ref 20–29)
CREAT SERPL-MCNC: 0.75 MG/DL (ref 0.57–1)
EOSINOPHIL # BLD AUTO: 0.1 X10E3/UL (ref 0–0.4)
EOSINOPHIL NFR BLD AUTO: 2 %
ERYTHROCYTE [DISTWIDTH] IN BLOOD BY AUTOMATED COUNT: 14 % (ref 12.3–15.4)
GLOBULIN SER-MCNC: 2.8 G/DL (ref 1.5–4.5)
GLUCOSE SERPL-MCNC: 96 MG/DL (ref 65–99)
HCT VFR BLD AUTO: 42.8 % (ref 34–46.6)
HDLC SERPL-MCNC: 52 MG/DL
HGB BLD-MCNC: 14.1 G/DL (ref 11.1–15.9)
IMM GRANULOCYTES # BLD: 0 X10E3/UL (ref 0–0.1)
IMM GRANULOCYTES NFR BLD: 0 %
LABCORP COMMENT: NORMAL
LDLC SERPL CALC-MCNC: 135 MG/DL (ref 0–99)
LYMPHOCYTES # BLD AUTO: 2 X10E3/UL (ref 0.7–3.1)
LYMPHOCYTES NFR BLD AUTO: 33 %
MCH RBC QN AUTO: 28.7 PG (ref 26.6–33)
MCHC RBC AUTO-ENTMCNC: 32.9 G/DL (ref 31.5–35.7)
MCV RBC AUTO: 87 FL (ref 79–97)
MONOCYTES # BLD AUTO: 0.5 X10E3/UL (ref 0.1–0.9)
MONOCYTES NFR BLD AUTO: 7 %
NEUTROPHILS # BLD AUTO: 3.5 X10E3/UL (ref 1.4–7)
NEUTROPHILS NFR BLD AUTO: 57 %
PLATELET # BLD AUTO: 180 X10E3/UL (ref 150–379)
POTASSIUM SERPL-SCNC: 4.2 MMOL/L (ref 3.5–5.2)
PROT SERPL-MCNC: 6.9 G/DL (ref 6–8.5)
RBC # BLD AUTO: 4.91 X10E6/UL (ref 3.77–5.28)
SL AMB EGFR AFRICAN AMERICAN: 102 ML/MIN/1.73
SL AMB EGFR NON AFRICAN AMERICAN: 88 ML/MIN/1.73
SL AMB VLDL CHOLESTEROL CALC: 49 MG/DL (ref 5–40)
SODIUM SERPL-SCNC: 139 MMOL/L (ref 134–144)
TRIGL SERPL-MCNC: 243 MG/DL (ref 0–149)
TSH SERPL DL<=0.005 MIU/L-ACNC: 2.53 UIU/ML (ref 0.45–4.5)
WBC # BLD AUTO: 6.2 X10E3/UL (ref 3.4–10.8)

## 2018-10-29 ENCOUNTER — OFFICE VISIT (OUTPATIENT)
Dept: FAMILY MEDICINE CLINIC | Facility: CLINIC | Age: 58
End: 2018-10-29
Payer: COMMERCIAL

## 2018-10-29 VITALS
OXYGEN SATURATION: 99 % | DIASTOLIC BLOOD PRESSURE: 78 MMHG | HEART RATE: 98 BPM | BODY MASS INDEX: 36.5 KG/M2 | RESPIRATION RATE: 18 BRPM | WEIGHT: 206 LBS | SYSTOLIC BLOOD PRESSURE: 142 MMHG | HEIGHT: 63 IN

## 2018-10-29 DIAGNOSIS — K44.9 HERNIA, HIATAL: ICD-10-CM

## 2018-10-29 DIAGNOSIS — E66.09 CLASS 2 OBESITY DUE TO EXCESS CALORIES WITHOUT SERIOUS COMORBIDITY WITH BODY MASS INDEX (BMI) OF 36.0 TO 36.9 IN ADULT: ICD-10-CM

## 2018-10-29 DIAGNOSIS — E78.2 MIXED HYPERLIPIDEMIA: Primary | ICD-10-CM

## 2018-10-29 DIAGNOSIS — L71.9 ROSACEA: ICD-10-CM

## 2018-10-29 DIAGNOSIS — K21.00 GASTROESOPHAGEAL REFLUX DISEASE WITH ESOPHAGITIS: ICD-10-CM

## 2018-10-29 PROBLEM — E66.812 CLASS 2 OBESITY DUE TO EXCESS CALORIES WITHOUT SERIOUS COMORBIDITY IN ADULT: Status: ACTIVE | Noted: 2018-10-29

## 2018-10-29 PROBLEM — R19.7 DIARRHEA: Status: RESOLVED | Noted: 2018-07-11 | Resolved: 2018-10-29

## 2018-10-29 PROCEDURE — 99214 OFFICE O/P EST MOD 30 MIN: CPT | Performed by: FAMILY MEDICINE

## 2018-10-29 RX ORDER — MINOCYCLINE HYDROCHLORIDE 50 MG/1
50 CAPSULE ORAL DAILY
Qty: 30 CAPSULE | Refills: 3 | Status: SHIPPED | OUTPATIENT
Start: 2018-10-29 | End: 2019-02-27

## 2018-10-29 NOTE — ASSESSMENT & PLAN NOTE
Cholesterol significantly worse due to dietary indiscretion  Patient has also been missing on fish oil supplementation in Red rice yeast   She was intolerant of statins in the past   Reluctant to go on any prescription medications  She does need to make significant dietary adjustments

## 2018-10-29 NOTE — ASSESSMENT & PLAN NOTE
Remains on Nexium 40 mg once daily  She does need to see surgeon to discuss fixing her hiatal hernia and she is quite symptomatic with this    She has seen GI

## 2018-10-29 NOTE — PROGRESS NOTES
Subjective:      Patient ID: Audi Flores is a 62 y o  female  Patient presents for follow-up of chronic conditions including hyperlipidemia, GERD, bilateral foot neuropathy  Patient had labs completed  Cholesterol is significantly worse  Patient admits that she has stopped being is diligent with taking red rice yeast and fish oil supplementation, she has gained 10 lb, her mother has been hospitalized and she has been stress eating  Patient did see podiatrist in regards to have once deformity and has been receiving injections  She also complains of neuropathic foot pain  Podiatrist recommended that she see chiropractor and I also recommended that she sees a chiropractor  Podiatrist have placed her on Lyrica but that made her feel very lightheaded  He prescribed duloxetine which she has not yet started  I believe that this would be a good option because of the amount of anxiety she has with her mother's care as well as the neuropathic pain she is experiencing  She did have Lyme serology performed, ILDA, sed rate and rheumatoid factor all of which were negative  Patient is always reluctant about taking medications  She also has to schedule an appointment to see bariatric surgeon in regards to having possible Nissen fundal plication for hiatal hernia and chronic GERD  She remains on Nexium 40 mg once daily          Past Medical History:   Diagnosis Date    Breast lump     last assessed 8/24/10    GERD (gastroesophageal reflux disease)     Hyperlipidemia     Myalgia     last assessed  1/29/15    Myositis     last assessed  1/29/15    Pernicious anemia        Family History   Problem Relation Age of Onset    Atrial fibrillation Mother     Breast cancer Mother     Hypertension Mother     Hypertension Father     Aneurysm Sister         abdominal aortic    Cancer Maternal Grandmother        Past Surgical History:   Procedure Laterality Date    CHOLECYSTECTOMY      GALLBLADDER SURGERY      HERNIA REPAIR      TUBAL LIGATION      URETHRAL SLING          reports that she has quit smoking  She has never used smokeless tobacco  She reports that she drinks alcohol  She reports that she does not use drugs  Current Outpatient Prescriptions:     coenzyme Q-10 100 MG capsule, Take by mouth, Disp: , Rfl:     conjugated estrogens (PREMARIN) vaginal cream, Insert into the vagina daily, Disp: , Rfl:     esomeprazole (NexIUM) 40 MG capsule, Take 1 capsule (40 mg total) by mouth daily, Disp: 90 capsule, Rfl: 1    Omega-3 Fatty Acids (FISH OIL) 1,000 mg, Take by mouth, Disp: , Rfl:     Red Yeast Rice 600 MG CAPS, Take 1 capsule (600 mg total) by mouth daily in the early morning, Disp: , Rfl: 0    clotrimazole-betamethasone (LOTRISONE) 1-0 05 % cream, Apply topically 2 (two) times a day (Patient not taking: Reported on 10/29/2018 ), Disp: 45 g, Rfl: 0    The following portions of the patient's history were reviewed and updated as appropriate: allergies, current medications, past family history, past medical history, past social history, past surgical history and problem list     Review of Systems   Constitutional: Positive for unexpected weight change (  Recent weight gain)  HENT: Negative  Eyes: Negative  Respiratory: Negative  Cardiovascular: Negative  Gastrointestinal: Negative  Chronic GERD   Endocrine: Negative  Genitourinary: Negative  Musculoskeletal: Positive for back pain and myalgias ( pain in the left lower leg intermittently especially across the anterior thigh)  Negative for gait problem  Skin: Negative  Allergic/Immunologic: Negative  Neurological: Negative  Hematological: Negative  Psychiatric/Behavioral: The patient is nervous/anxious              Objective:    /78   Pulse 98   Resp 18   Ht 5' 3" (1 6 m)   Wt 93 4 kg (206 lb)   LMP 01/01/2016   SpO2 99%   BMI 36 49 kg/m²      Physical Exam   Constitutional: She is oriented to person, place, and time  She appears well-developed and well-nourished  Obese   HENT:   Head: Normocephalic and atraumatic  Eyes: Pupils are equal, round, and reactive to light  Conjunctivae are normal    Neck: Normal range of motion  Neck supple  Cardiovascular: Normal rate and regular rhythm  Pulmonary/Chest: Effort normal and breath sounds normal    Abdominal: Soft  Bowel sounds are normal    Neurological: She is alert and oriented to person, place, and time  She has normal reflexes  Psychiatric: She has a normal mood and affect  Her behavior is normal  Judgment and thought content normal    Nursing note and vitals reviewed  Recent Results (from the past 1008 hour(s))   GP Culture, Genital    Collection Time: 10/08/18 10:26 AM   Result Value Ref Range    SL AMB GENITAL CULTURE #1:MODERATE GROWT    (A) NORMAL ISABELLA   CBC and differential    Collection Time: 10/16/18  8:01 AM   Result Value Ref Range    White Blood Cell Count 6 2 3 4 - 10 8 x10E3/uL    Red Blood Cell Count 4 91 3 77 - 5 28 x10E6/uL    Hemoglobin 14 1 11 1 - 15 9 g/dL    HCT 42 8 34 0 - 46 6 %    MCV 87 79 - 97 fL    MCH 28 7 26 6 - 33 0 pg    MCHC 32 9 31 5 - 35 7 g/dL    RDW 14 0 12 3 - 15 4 %    Platelet Count 625 573 - 379 x10E3/uL    Neutrophils 57 Not Estab  %    Lymphocytes 33 Not Estab  %    Monocytes 7 Not Estab  %    Eosinophils 2 Not Estab  %    Basophils Relative 1 Not Estab  %    Neutrophils (Absolute) 3 5 1 4 - 7 0 x10E3/uL    Lymphocytes (Absolute) 2 0 0 7 - 3 1 x10E3/uL    Monocytes (Absolute) 0 5 0 1 - 0 9 x10E3/uL    Eosinophils (Absolute) 0 1 0 0 - 0 4 x10E3/uL    Basophils (Absolute) 0 0 0 0 - 0 2 x10E3/uL    Immature Granulocytes 0 Not Estab  %    Immature Granulocytes (Absolute) 0 0 0 0 - 0 1 x10E3/uL   Comprehensive metabolic panel    Collection Time: 10/16/18  8:01 AM   Result Value Ref Range    Glucose 96 65 - 99 mg/dL    BUN 16 6 - 24 mg/dL    Creatinine 0 75 0 57 - 1 00 mg/dL    eGFR Non  88 >59 mL/min/1 73    SL AMB EGFR AFRICAN AMERICAN 102 >59 mL/min/1 73    SL AMB BUN/CREATININE RATIO 21 9 - 23    Sodium 139 134 - 144 mmol/L    Potassium 4 2 3 5 - 5 2 mmol/L    Chloride 100 96 - 106 mmol/L    CO2 23 20 - 29 mmol/L    CALCIUM 9 3 8 7 - 10 2 mg/dL    SL AMB PROTEIN, TOTAL 6 9 6 0 - 8 5 g/dL    Albumin 4 1 3 5 - 5 5 g/dL    Globulin, Total 2 8 1 5 - 4 5 g/dL    SL AMB ALBUMIN/GLOBULIN RATIO 1 5 1 2 - 2 2    TOTAL BILIRUBIN 0 4 0 0 - 1 2 mg/dL    Alk Phos Isoenzymes 106 39 - 117 IU/L    SL AMB AST 16 0 - 40 IU/L    SL AMB ALT 15 0 - 32 IU/L   Lipid panel    Collection Time: 10/16/18  8:01 AM   Result Value Ref Range    Cholesterol, Total 236 (H) 100 - 199 mg/dL    Triglycerides 243 (H) 0 - 149 mg/dL    HDL 52 >39 mg/dL    SL AMB VLDL CHOLESTEROL CALC 49 (H) 5 - 40 mg/dL    LDL Direct 135 (H) 0 - 99 mg/dL   TSH, 3rd generation with Free T4 reflex    Collection Time: 10/16/18  8:01 AM   Result Value Ref Range    TSH 2 530 0 450 - 4 500 uIU/mL   Specimen Status Report    Collection Time: 10/16/18  8:01 AM   Result Value Ref Range    Comment Comment        Assessment/Plan:    No problem-specific Assessment & Plan notes found for this encounter  Problem List Items Addressed This Visit     Esophageal reflux      Remains on Nexium 40 mg once daily  She does need to see surgeon to discuss fixing her hiatal hernia and she is quite symptomatic with this    She has seen GI         Relevant Orders    Comprehensive metabolic panel    CBC and differential    Lipid panel    Hernia, hiatal      Patient should contact bariatric surgery team to discuss hiatal hernia repair         Relevant Orders    Comprehensive metabolic panel    CBC and differential    Lipid panel    Hyperlipidemia - Primary    Relevant Orders    Comprehensive metabolic panel    CBC and differential    Lipid panel    Rosacea      Refill provided of minocycline 50 mg once         Relevant Medications    minocycline (MINOCIN) 50 mg capsule Other Relevant Orders    Comprehensive metabolic panel    CBC and differential    Lipid panel    Class 2 obesity due to excess calories without serious comorbidity in adult      Patient would benefit from dietary modification exercise for weight loss

## 2018-11-12 ENCOUNTER — OFFICE VISIT (OUTPATIENT)
Dept: FAMILY MEDICINE CLINIC | Facility: CLINIC | Age: 58
End: 2018-11-12
Payer: COMMERCIAL

## 2018-11-12 VITALS
WEIGHT: 196 LBS | RESPIRATION RATE: 18 BRPM | OXYGEN SATURATION: 98 % | HEIGHT: 63 IN | SYSTOLIC BLOOD PRESSURE: 126 MMHG | DIASTOLIC BLOOD PRESSURE: 78 MMHG | HEART RATE: 80 BPM | BODY MASS INDEX: 34.73 KG/M2

## 2018-11-12 DIAGNOSIS — M25.561 RIGHT KNEE PAIN, UNSPECIFIED CHRONICITY: Primary | ICD-10-CM

## 2018-11-12 PROCEDURE — 99213 OFFICE O/P EST LOW 20 MIN: CPT | Performed by: NURSE PRACTITIONER

## 2018-11-12 NOTE — PROGRESS NOTES
Assessment/Plan:      Diagnoses and all orders for this visit:    Right knee pain, unspecified chronicity  -     XR knee 4+ vw right injury; Future  -     Ambulatory referral to Orthopedic Surgery; Future      X-ray ordered  Will follow-up results with the patient  Patient instructed to use ice and ibuprofen OTC prn for pain  Patient referred to ortho for further evaluation and treatment  Subjective:     Patient ID: Sachin Tineo is a 62 y o  female  Patient reports right knee pain for the past couple of months  Patient reports that she tripped on a piece of metal and landed on her right knee on the sidewalk a few months ago  Patient reports that her knee has hurt since then  Patient describes the pain as a sharp sensation  Patient reports that the pain is worse with bending  Patient reports that she used to only have the pain with kneeling but now the pain is fairly constant  Patient reports that sometimes the pain radiates down the front of her leg  Patient denies putting any ice or heat on it  Patient reports that she takes ibuprofen OTC prn  Patient reports that the pain is getting worse  Review of Systems   Constitutional: Negative for chills and fever  Respiratory: Negative for chest tightness, shortness of breath and wheezing  Cardiovascular: Negative for chest pain and palpitations  Gastrointestinal: Negative for abdominal pain, diarrhea and vomiting  Musculoskeletal:        As noted in HPI  Neurological: Negative for dizziness, syncope, light-headedness and headaches  Objective:     Physical Exam   Constitutional: She is oriented to person, place, and time  No distress  Cardiovascular: Normal rate, regular rhythm and normal heart sounds  Pulmonary/Chest: Effort normal and breath sounds normal  No respiratory distress  She has no wheezes  Musculoskeletal:   Right knee is slightly more swollen than the left  No redness noted  No tenderness noted on palpation  Patient reports pain with ROM of right knee  Neurological: She is alert and oriented to person, place, and time  Skin: No rash noted  Psychiatric: She has a normal mood and affect  Vitals reviewed

## 2018-11-16 ENCOUNTER — TRANSCRIBE ORDERS (OUTPATIENT)
Dept: ADMINISTRATIVE | Facility: HOSPITAL | Age: 58
End: 2018-11-16

## 2018-11-16 ENCOUNTER — HOSPITAL ENCOUNTER (OUTPATIENT)
Dept: RADIOLOGY | Facility: HOSPITAL | Age: 58
Discharge: HOME | End: 2018-11-16
Payer: COMMERCIAL

## 2018-11-16 DIAGNOSIS — M25.561 RIGHT KNEE PAIN, UNSPECIFIED CHRONICITY: ICD-10-CM

## 2018-11-16 PROCEDURE — 73564 X-RAY EXAM KNEE 4 OR MORE: CPT

## 2018-11-20 ENCOUNTER — OFFICE VISIT (OUTPATIENT)
Dept: OBGYN CLINIC | Facility: CLINIC | Age: 58
End: 2018-11-20
Payer: COMMERCIAL

## 2018-11-20 VITALS
DIASTOLIC BLOOD PRESSURE: 83 MMHG | BODY MASS INDEX: 34.73 KG/M2 | SYSTOLIC BLOOD PRESSURE: 124 MMHG | WEIGHT: 196 LBS | HEART RATE: 72 BPM | HEIGHT: 63 IN

## 2018-11-20 DIAGNOSIS — M25.561 RIGHT KNEE PAIN, UNSPECIFIED CHRONICITY: Primary | ICD-10-CM

## 2018-11-20 PROCEDURE — 99243 OFF/OP CNSLTJ NEW/EST LOW 30: CPT | Performed by: ORTHOPAEDIC SURGERY

## 2018-11-20 RX ORDER — MELOXICAM 15 MG/1
15 TABLET ORAL DAILY
Qty: 30 TABLET | Refills: 0 | Status: SHIPPED | OUTPATIENT
Start: 2018-11-20 | End: 2018-12-27 | Stop reason: SDUPTHER

## 2018-11-20 NOTE — PROGRESS NOTES
Patient Name:  Shannon Palma  MRN:  [id number]    Assessment     1  Right knee pain, unspecified chronicity  Ambulatory referral to Orthopedic Surgery    meloxicam (MOBIC) 15 mg tablet       Plan     Right knee patellofemoral DJD  1  Prescription for meloxicam   2  Activity modification to avoid pain  3  Follow-up as needed  Briefly discussed cortisone injection versus MRI if pain persists or worsens  Chief Complaint     Right knee pain      History of the Present Illness     Shannon Palma is a 62 y o  female who reports to the office today for initial evaluation of her right knee  Patient states she fell onto her right knee approximately three months ago  Since then she has been experiencing anterior right knee pain  Over the past month her pain has became progressively worse  She notes anterior and posterior knee pain  Pain radiates distally along the tibia  She takes ibuprofen on occasion which provides mild relief  Patient works as a   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  She denies any significant swelling or stiffness  She denies weakness or instability  No numbness or tingling  No fevers or chills  Physical Exam     /83   Pulse 72   Ht 5' 3" (1 6 m)   Wt 88 9 kg (196 lb)   LMP 01/01/2016   BMI 34 72 kg/m²     Right knee:  No gross deformity  Skin intact  No erythema ecchymosis or swelling  No effusion  No medial and lateral joint line tenderness  Range of motion includes full extension and flexion to 120°  Pain noted with terminal flexion  Stable to varus and valgus stress  Negative Bob's test   Positive patellar apprehension test   Sensation intact right lower extremity  Eyes:  Anicteric sclerae  Neck:  Supple  Lungs:  Unlabored breathing  Cardiovascular:  Capillary refill is less than 2 seconds  Skin:  Intact without erythema  Neurologic:  Sensation intact to light touch    Psychiatric:  Mood and affect are appropriate  Data Review     I have personally reviewed pertinent films in PACS, and my interpretation follows:    X-rays previously performed of the right knee reveals degenerative changes about the patellofemoral compartment  No other osseous abnormalities noted  Past Medical History:   Diagnosis Date    Breast lump     last assessed 8/24/10    GERD (gastroesophageal reflux disease)     Hyperlipidemia     Myalgia     last assessed  1/29/15    Myositis     last assessed  1/29/15    Pernicious anemia        Past Surgical History:   Procedure Laterality Date    CHOLECYSTECTOMY      GALLBLADDER SURGERY      HERNIA REPAIR      TUBAL LIGATION      URETHRAL SLING         No Known Allergies    Current Outpatient Prescriptions on File Prior to Visit   Medication Sig Dispense Refill    clotrimazole-betamethasone (LOTRISONE) 1-0 05 % cream Apply topically 2 (two) times a day (Patient not taking: Reported on 10/29/2018 ) 45 g 0    coenzyme Q-10 100 MG capsule Take by mouth      conjugated estrogens (PREMARIN) vaginal cream Insert into the vagina daily      esomeprazole (NexIUM) 40 MG capsule Take 1 capsule (40 mg total) by mouth daily 90 capsule 1    minocycline (MINOCIN) 50 mg capsule Take 1 capsule (50 mg total) by mouth daily for 120 days 30 capsule 3    Omega-3 Fatty Acids (FISH OIL) 1,000 mg Take by mouth      Red Yeast Rice 600 MG CAPS Take 1 capsule (600 mg total) by mouth daily in the early morning  0     No current facility-administered medications on file prior to visit          Social History   Substance Use Topics    Smoking status: Former Smoker    Smokeless tobacco: Never Used      Comment: Quit at age 32    Alcohol use Yes      Comment: social       Family History   Problem Relation Age of Onset    Atrial fibrillation Mother    Pop Irvingrie Breast cancer Mother     Hypertension Mother     Hypertension Father     Aneurysm Sister         abdominal aortic    Cancer Maternal Grandmother Review of Systems     As stated in the HPI  All other systems were reviewed and are negative        Scribe Attestation    I,:   Violette Luna PA-C am acting as a scribe while in the presence of the attending physician :        I,:   Tika Figueroa MD personally performed the services described in this documentation    as scribed in my presence :

## 2018-12-27 ENCOUNTER — TELEPHONE (OUTPATIENT)
Dept: PAIN MEDICINE | Facility: MEDICAL CENTER | Age: 58
End: 2018-12-27

## 2018-12-27 DIAGNOSIS — M25.561 RIGHT KNEE PAIN, UNSPECIFIED CHRONICITY: Primary | ICD-10-CM

## 2018-12-27 RX ORDER — MELOXICAM 15 MG/1
15 TABLET ORAL DAILY
Qty: 30 TABLET | Refills: 0 | Status: SHIPPED | OUTPATIENT
Start: 2018-12-27 | End: 2019-02-28

## 2018-12-27 NOTE — TELEPHONE ENCOUNTER
Pt is calling requesting a MRI for her knee, pt stated she heard something snap and it hurt real bad  Also pt is all of out of her Mobic 15 mg and wants to know if she should continue and if so she would need another refill  She takes 1 by mouth daily and uses [facility] in [place]

## 2018-12-27 NOTE — TELEPHONE ENCOUNTER
Refill of meloxicam sent to pharmacy electronically  MRI right knee ordered  Can you help her get scheduled? Thanks!

## 2018-12-27 NOTE — TELEPHONE ENCOUNTER
I called the patient and left a VM with the # for central scheduling if she'd like to schedule herself    Otherwise, if she would like me to help, I also left her my direct phone#

## 2019-01-08 ENCOUNTER — HOSPITAL ENCOUNTER (OUTPATIENT)
Dept: RADIOLOGY | Age: 59
Discharge: HOME | End: 2019-01-08
Payer: COMMERCIAL

## 2019-01-08 DIAGNOSIS — M25.561 RIGHT KNEE PAIN, UNSPECIFIED CHRONICITY: ICD-10-CM

## 2019-01-08 PROCEDURE — 73721 MRI JNT OF LWR EXTRE W/O DYE: CPT

## 2019-01-15 ENCOUNTER — TELEPHONE (OUTPATIENT)
Dept: OBGYN CLINIC | Facility: HOSPITAL | Age: 59
End: 2019-01-15

## 2019-01-15 NOTE — TELEPHONE ENCOUNTER
Patient is calling  [phone]    Patient had her mri on 1/8/19  Patient called today for the results  I did schedule the patient for the first appt that suited her on 1/22/19  Please advise if the patient can be given the results sooner or if it is ok for her to wait until her appt on 1/22/19

## 2019-01-22 ENCOUNTER — OFFICE VISIT (OUTPATIENT)
Dept: OBGYN CLINIC | Facility: CLINIC | Age: 59
End: 2019-01-22
Payer: COMMERCIAL

## 2019-01-22 VITALS
SYSTOLIC BLOOD PRESSURE: 122 MMHG | HEART RATE: 81 BPM | BODY MASS INDEX: 34.73 KG/M2 | DIASTOLIC BLOOD PRESSURE: 74 MMHG | WEIGHT: 196 LBS | HEIGHT: 63 IN

## 2019-01-22 DIAGNOSIS — M17.11 PRIMARY OSTEOARTHRITIS OF RIGHT KNEE: Primary | ICD-10-CM

## 2019-01-22 DIAGNOSIS — S83.241A TEAR OF MEDIAL MENISCUS OF RIGHT KNEE, CURRENT, UNSPECIFIED TEAR TYPE, INITIAL ENCOUNTER: ICD-10-CM

## 2019-01-22 PROCEDURE — 99213 OFFICE O/P EST LOW 20 MIN: CPT | Performed by: ORTHOPAEDIC SURGERY

## 2019-01-22 RX ORDER — IBUPROFEN 600 MG/1
TABLET ORAL EVERY 6 HOURS PRN
Status: ON HOLD | COMMUNITY
Start: 2019-01-11 | End: 2019-06-03 | Stop reason: SDUPTHER

## 2019-01-22 NOTE — PROGRESS NOTES
Patient Name:  Audi Flores  MRN:  [id number]    Assessment     1  Primary osteoarthritis of right knee     2  Tear of medial meniscus of right knee, current, unspecified tear type, initial encounter         Plan     1  Continue ibuprofen for now  2  Activity as tolerated, modify as needed  3  Discussed possible steroid injection if pain worsens or persists  Briefly discussed arthroscopic partial medial meniscectomy or hyaluronic acid injection as treatment alternatives as well  Return if symptoms worsen or fail to improve  Subjective     Patient returns today for her right knee  She reports worsening of her right knee pain after her last visit on 11/20/18 localizing anteriorly  She describes intermittent episodes of subjective instability and weakness  Since that time the pain has improved significantly  She has been taking ibuprofen for tooth problem with improvement in her knee pain as well  She also wears a hinged knee brace for work which also helps  Objective     /74   Pulse 81   Ht 5' 3" (1 6 m)   Wt 88 9 kg (196 lb)   LMP 01/01/2016   BMI 34 72 kg/m²       Data Review     I have personally reviewed pertinent films in PACS, and my interpretation follows  MRI right knee 1/8/19:  Tear of the posterior root medial meniscus  Moderate to severe articular cartilage thinning of the patella with underlying mild bone marrow edema  Small effusion  Counseling     The patient was counseled regarding diagnostic results, impressions, patient/family education, instructions for management, risks and benefits of treatment options, and prognosis  The total time of the encounter was 15 minutes, and more than 50% of that time was spent in counseling and coordination of care

## 2019-02-15 DIAGNOSIS — K21.9 GERD WITHOUT ESOPHAGITIS: ICD-10-CM

## 2019-02-15 RX ORDER — ESOMEPRAZOLE MAGNESIUM 40 MG/1
CAPSULE, DELAYED RELEASE ORAL
Qty: 90 CAPSULE | Refills: 1 | Status: SHIPPED | OUTPATIENT
Start: 2019-02-15 | End: 2019-09-22 | Stop reason: SDUPTHER

## 2019-02-21 ENCOUNTER — TELEPHONE (OUTPATIENT)
Dept: OBGYN CLINIC | Facility: HOSPITAL | Age: 59
End: 2019-02-21

## 2019-02-21 NOTE — TELEPHONE ENCOUNTER
Caller: patient   Cb#: [phone]  Dr Manzo     Patient called in requesting a call back  She is being seen for meniscus tear of the right knee  She has a couple of questions in regards to surgery  She would like to know if she is to have the surgery, how long will the recovery be? Please advise, thank you

## 2019-02-23 ENCOUNTER — OFFICE VISIT (OUTPATIENT)
Dept: URGENT CARE | Facility: CLINIC | Age: 59
End: 2019-02-23
Payer: COMMERCIAL

## 2019-02-23 VITALS
HEART RATE: 75 BPM | HEIGHT: 66 IN | BODY MASS INDEX: 32.02 KG/M2 | WEIGHT: 199.2 LBS | OXYGEN SATURATION: 98 % | TEMPERATURE: 97 F | SYSTOLIC BLOOD PRESSURE: 123 MMHG | RESPIRATION RATE: 16 BRPM | DIASTOLIC BLOOD PRESSURE: 90 MMHG

## 2019-02-23 DIAGNOSIS — K52.9 GASTROENTERITIS: Primary | ICD-10-CM

## 2019-02-23 PROCEDURE — 99213 OFFICE O/P EST LOW 20 MIN: CPT | Performed by: PHYSICIAN ASSISTANT

## 2019-02-23 RX ORDER — DICYCLOMINE HCL 20 MG
20 TABLET ORAL EVERY 6 HOURS
Qty: 12 TABLET | Refills: 0 | Status: SHIPPED | OUTPATIENT
Start: 2019-02-23 | End: 2019-02-27

## 2019-02-23 NOTE — PATIENT INSTRUCTIONS
Take bentyl as directed  Follow BRAT (bananas, rice, apples, toast) diet as discussed  Once feeling up to it, you can begin to introduce new foods and advance diet as it is tolerated  Take Pepto-bismol or Tums as directed for nausea and stomach upset  Stay hydrated, drinking plenty of water and Gatorade  Follow up with your family doctor in 1-3 days  Proceed to the ER if symptoms worsen

## 2019-02-23 NOTE — PROGRESS NOTES
[facility]        NAME: Goldy Azevedo is a 62 y o  female  : 1960    MRN: [id number]  DATE: 2019  TIME: 12:10 PM    Assessment and Plan   Gastroenteritis [K52 9]  1  Gastroenteritis  dicyclomine (BENTYL) 20 mg tablet     Patient Instructions   Take bentyl as directed  Follow BRAT (bananas, rice, apples, toast) diet as discussed  Once feeling up to it, you can begin to introduce new foods and advance diet as it is tolerated  Take Pepto-bismol or Tums as directed for nausea and stomach upset  Stay hydrated, drinking plenty of water and Gatorade  Follow up with your family doctor in 1-3 days  Proceed to the ER if symptoms worsen  The diagnosis, etiology, expected course of illness, and treatment plan were reviewed  All questions answered  Precautions given  Patient verbalized understanding and agreement the treatment plan  Chief Complaint     Chief Complaint   Patient presents with    GI Problem     nausea, diarrhea abdominal pain (spasms) since Tuesday      History of Present Illness   60-year-old female presenting with complaint of nausea x 4 days  Sx associated with diarrhea, decreased appetite, and cramping abdominal pains  She notes cramping abdominal pains are intermittent, with no known cause or exacerbation  Nausea and vomiting are only present after eating  She reports last episode of diarrhea was  morning, and has not had a BM since  She notes she was sick with sore throat and runny nose prior to onset  Throat sx have resolved, however, runny nose is lingering  She did attempt treating yesterday with pepto bismol finding relief of nausea  Review of Systems   Review of Systems   Constitutional: Negative for chills and diaphoresis  HENT: Negative for congestion, ear pain and postnasal drip  Respiratory: Negative for cough, shortness of breath and wheezing  Cardiovascular: Negative for chest pain and palpitations     Gastrointestinal: Negative for vomiting  Skin: Negative for rash       Current Medications       Current Outpatient Medications:     coenzyme Q-10 100 MG capsule, Take by mouth, Disp: , Rfl:     conjugated estrogens (PREMARIN) vaginal cream, Insert into the vagina daily, Disp: , Rfl:     esomeprazole (NexIUM) 40 MG capsule, TAKE ONE CAPSULE BY MOUTH EVERY DAY, Disp: 90 capsule, Rfl: 1    ibuprofen (MOTRIN) 600 mg tablet, , Disp: , Rfl:     minocycline (MINOCIN) 50 mg capsule, Take 1 capsule (50 mg total) by mouth daily for 120 days, Disp: 30 capsule, Rfl: 3    Omega-3 Fatty Acids (FISH OIL) 1,000 mg, Take by mouth, Disp: , Rfl:     Red Yeast Rice 600 MG CAPS, Take 1 capsule (600 mg total) by mouth daily in the early morning, Disp: , Rfl: 0    clotrimazole-betamethasone (LOTRISONE) 1-0 05 % cream, Apply topically 2 (two) times a day (Patient not taking: Reported on 10/29/2018 ), Disp: 45 g, Rfl: 0    dicyclomine (BENTYL) 20 mg tablet, Take 1 tablet (20 mg total) by mouth every 6 (six) hours, Disp: 12 tablet, Rfl: 0    meloxicam (MOBIC) 15 mg tablet, Take 1 tablet (15 mg total) by mouth daily (Patient not taking: Reported on 2/23/2019), Disp: 30 tablet, Rfl: 0    Current Allergies     Allergies as of 02/23/2019    (No Known Allergies)          The following portions of the patient's history were reviewed and updated as appropriate: allergies, current medications, past family history, past medical history, past social history, past surgical history and problem list      Past Medical History:   Diagnosis Date    Breast lump     last assessed 8/24/10    GERD (gastroesophageal reflux disease)     Hyperlipidemia     Myalgia     last assessed  1/29/15    Myositis     last assessed  1/29/15    Pernicious anemia        Past Surgical History:   Procedure Laterality Date    CHOLECYSTECTOMY      GALLBLADDER SURGERY      HERNIA REPAIR      TUBAL LIGATION      URETHRAL SLING       Family History   Problem Relation Age of Onset    Atrial fibrillation Mother    [facility] Breast cancer Mother     Hypertension Mother     Hypertension Father     Aneurysm Sister         abdominal aortic    Cancer Maternal Grandmother      Medications have been verified  Objective   /90   Pulse 75   Temp (!) 97 °F (36 1 °C)   Resp 16   Ht 5' 6" (1 676 m)   Wt 90 4 kg (199 lb 3 2 oz)   LMP 01/01/2016   SpO2 98%   BMI 32 15 kg/m²      Physical Exam     Physical Exam   Constitutional: She is oriented to person, place, and time  Vital signs are normal  She appears well-developed and well-nourished  She is cooperative  She does not appear ill  No distress  HENT:   Head: Normocephalic and atraumatic  Right Ear: Hearing, tympanic membrane, external ear and ear canal normal  No middle ear effusion  Left Ear: Hearing, tympanic membrane, external ear and ear canal normal   No middle ear effusion  Nose: Nose normal  No mucosal edema or rhinorrhea  Mouth/Throat: Uvula is midline, oropharynx is clear and moist and mucous membranes are normal  Mucous membranes are not pale, not dry and not cyanotic  No oropharyngeal exudate, posterior oropharyngeal edema or posterior oropharyngeal erythema  Eyes: Conjunctivae are normal  Right eye exhibits no discharge  Left eye exhibits no discharge  No scleral icterus  Neck: Trachea normal and phonation normal  Neck supple  No tracheal tenderness present  No neck rigidity  No edema and no erythema present  Cardiovascular: Normal rate, regular rhythm and normal heart sounds  Exam reveals no gallop and no friction rub  No murmur heard  Pulmonary/Chest: Effort normal and breath sounds normal  No stridor  No respiratory distress  She has no decreased breath sounds  She has no wheezes  She has no rhonchi  She has no rales  Abdominal: Soft  Normal appearance and bowel sounds are normal  She exhibits no distension and no fluid wave  There is generalized tenderness   There is no rigidity, no rebound, no guarding, no CVA tenderness, no tenderness at McBurney's point and negative Davis's sign  Lymphadenopathy:     She has no cervical adenopathy  Neurological: She is alert and oriented to person, place, and time  No cranial nerve deficit  She exhibits normal muscle tone  Coordination normal    Skin: Skin is warm and dry  No rash noted  She is not diaphoretic  Psychiatric: She has a normal mood and affect  Her behavior is normal    Nursing note and vitals reviewed

## 2019-02-27 ENCOUNTER — OFFICE VISIT (OUTPATIENT)
Dept: FAMILY MEDICINE CLINIC | Facility: CLINIC | Age: 59
End: 2019-02-27
Payer: COMMERCIAL

## 2019-02-27 VITALS
HEIGHT: 66 IN | HEART RATE: 80 BPM | OXYGEN SATURATION: 99 % | BODY MASS INDEX: 31.5 KG/M2 | RESPIRATION RATE: 16 BRPM | SYSTOLIC BLOOD PRESSURE: 126 MMHG | DIASTOLIC BLOOD PRESSURE: 72 MMHG | WEIGHT: 196 LBS | TEMPERATURE: 98 F

## 2019-02-27 DIAGNOSIS — K29.00 ACUTE GASTRITIS WITHOUT HEMORRHAGE, UNSPECIFIED GASTRITIS TYPE: Primary | ICD-10-CM

## 2019-02-27 PROCEDURE — 3008F BODY MASS INDEX DOCD: CPT | Performed by: FAMILY MEDICINE

## 2019-02-27 PROCEDURE — 99213 OFFICE O/P EST LOW 20 MIN: CPT | Performed by: FAMILY MEDICINE

## 2019-02-27 PROCEDURE — 99284 EMERGENCY DEPT VISIT MOD MDM: CPT

## 2019-02-27 RX ORDER — DIPHENOXYLATE HYDROCHLORIDE AND ATROPINE SULFATE 2.5; .025 MG/1; MG/1
1 TABLET ORAL 4 TIMES DAILY PRN
Qty: 30 TABLET | Refills: 0 | Status: SHIPPED | OUTPATIENT
Start: 2019-02-27 | End: 2019-02-28

## 2019-02-27 RX ORDER — ONDANSETRON 4 MG/1
4 TABLET, FILM COATED ORAL EVERY 8 HOURS PRN
Qty: 30 TABLET | Refills: 0 | Status: SHIPPED | OUTPATIENT
Start: 2019-02-27 | End: 2019-05-13

## 2019-02-27 NOTE — PROGRESS NOTES
Subjective:      Patient ID: Mumtaz Mitchell is a 62 y o  female  Patient presents for follow-up after urgent care visit for acute viral gastroenteritis  Patient has been having nausea for approximately 1 week  No vomiting  Nausea has less than  She has been eating a a brat diet  Initially she did have severe watery diarrhea  After the 30 day of symptoms she had presented to Urgent [facility]  She was diagnosed with viral gastroenteritis and given a prescription for Bentyl  She has been taking the Bentyl which has been improving the abdominal cramping  Occasionally she still develops nausea  She has been drinking vitamin water, eating bread and a meal on a daily basis  Last 2 days she has had formed stools though after she eats she is still getting cramping   recently started with symptoms today  No fevers or chills  She has lost 3 lb  No recent travel  No recent antibiotics  Past Medical History:   Diagnosis Date    Breast lump     last assessed 8/24/10    GERD (gastroesophageal reflux disease)     Hyperlipidemia     Myalgia     last assessed  1/29/15    Myositis     last assessed  1/29/15    Pernicious anemia        Family History   Problem Relation Age of Onset    Atrial fibrillation Mother     Breast cancer Mother     Hypertension Mother     Hypertension Father     Aneurysm Sister         abdominal aortic    Cancer Maternal Grandmother        Past Surgical History:   Procedure Laterality Date    CHOLECYSTECTOMY      GALLBLADDER SURGERY      HERNIA REPAIR      TUBAL LIGATION      URETHRAL SLING          reports that she has quit smoking  She has never used smokeless tobacco  She reports that she drinks alcohol  She reports that she does not use drugs        Current Outpatient Medications:     clotrimazole-betamethasone (LOTRISONE) 1-0 05 % cream, Apply topically 2 (two) times a day, Disp: 45 g, Rfl: 0    coenzyme Q-10 100 MG capsule, Take by mouth, Disp: , Rfl:   conjugated estrogens (PREMARIN) vaginal cream, Insert into the vagina daily, Disp: , Rfl:     esomeprazole (NexIUM) 40 MG capsule, TAKE ONE CAPSULE BY MOUTH EVERY DAY, Disp: 90 capsule, Rfl: 1    ibuprofen (MOTRIN) 600 mg tablet, , Disp: , Rfl:     Omega-3 Fatty Acids (FISH OIL) 1,000 mg, Take by mouth, Disp: , Rfl:     Red Yeast Rice 600 MG CAPS, Take 1 capsule (600 mg total) by mouth daily in the early morning, Disp: , Rfl: 0    diphenoxylate-atropine (LOMOTIL) 2 5-0 025 mg per tablet, Take 1 tablet by mouth 4 (four) times a day as needed for diarrhea, Disp: 30 tablet, Rfl: 0    meloxicam (MOBIC) 15 mg tablet, Take 1 tablet (15 mg total) by mouth daily (Patient not taking: Reported on 2/23/2019), Disp: 30 tablet, Rfl: 0    ondansetron (ZOFRAN) 4 mg tablet, Take 1 tablet (4 mg total) by mouth every 8 (eight) hours as needed for nausea or vomiting, Disp: 30 tablet, Rfl: 0    The following portions of the patient's history were reviewed and updated as appropriate: allergies, current medications, past family history, past medical history, past social history, past surgical history and problem list     Review of Systems   Constitutional: Positive for appetite change  Negative for activity change, fatigue, fever and unexpected weight change  HENT: Negative  Respiratory: Negative  Cardiovascular: Negative  Gastrointestinal: Positive for abdominal distention (Bloating), abdominal pain and nausea  Negative for blood in stool, constipation, diarrhea and vomiting  Objective:    /72   Pulse 80   Temp 98 °F (36 7 °C) (Tympanic)   Resp 16   Ht 5' 6" (1 676 m)   Wt 88 9 kg (196 lb)   LMP 01/01/2016   SpO2 99%   BMI 31 64 kg/m²      Physical Exam   Constitutional: She appears well-developed and well-nourished  Non-toxic appearance  She does not appear ill  No distress  Cardiovascular: Normal rate, regular rhythm and normal heart sounds     Pulmonary/Chest: Effort normal and breath sounds normal    Abdominal: Soft  Normal appearance  She exhibits no distension, no pulsatile liver, no fluid wave, no ascites and no mass  Bowel sounds are increased  There is no hepatosplenomegaly, splenomegaly or hepatomegaly  There is generalized tenderness  No results found for this or any previous visit (from the past 1008 hour(s))  Assessment/Plan:    Gastritis  -advised on supportive care measures for viral gastroenteritis  Increase fluids, brat diet  Avoid high-fiber such as oatmeal  -patient given a prescription for Zofran as needed for nausea  -patient given a prescription for Lomotil as needed for diarrhea and cramping  -  Follow-up if no improvement or resolution of symptoms  Problem List Items Addressed This Visit        Digestive    Gastritis - Primary     -advised on supportive care measures for viral gastroenteritis  Increase fluids, brat diet  Avoid high-fiber such as oatmeal  -patient given a prescription for Zofran as needed for nausea  -patient given a prescription for Lomotil as needed for diarrhea and cramping  -  Follow-up if no improvement or resolution of symptoms  Relevant Medications    diphenoxylate-atropine (LOMOTIL) 2 5-0 025 mg per tablet    ondansetron (ZOFRAN) 4 mg tablet          BMI Counseling: Body mass index is 31 64 kg/m²  Discussed the patient's BMI with her  The BMI is above average  BMI counseling and education was provided to the patient  Nutrition recommendations include decreasing overall calorie intake  not a problem  She has viral gastroenteritis and is losing weight

## 2019-02-27 NOTE — ASSESSMENT & PLAN NOTE
-advised on supportive care measures for viral gastroenteritis  Increase fluids, brat diet  Avoid high-fiber such as oatmeal  -patient given a prescription for Zofran as needed for nausea  -patient given a prescription for Lomotil as needed for diarrhea and cramping  -  Follow-up if no improvement or resolution of symptoms

## 2019-02-28 ENCOUNTER — TELEPHONE (OUTPATIENT)
Dept: FAMILY MEDICINE CLINIC | Facility: CLINIC | Age: 59
End: 2019-02-28

## 2019-02-28 ENCOUNTER — HOSPITAL ENCOUNTER (EMERGENCY)
Facility: HOSPITAL | Age: 59
Discharge: HOME | End: 2019-02-28
Attending: EMERGENCY MEDICINE | Admitting: EMERGENCY MEDICINE
Payer: COMMERCIAL

## 2019-02-28 ENCOUNTER — APPOINTMENT (EMERGENCY)
Dept: RADIOLOGY | Facility: HOSPITAL | Age: 59
End: 2019-02-28
Payer: COMMERCIAL

## 2019-02-28 VITALS
OXYGEN SATURATION: 99 % | HEART RATE: 53 BPM | DIASTOLIC BLOOD PRESSURE: 81 MMHG | SYSTOLIC BLOOD PRESSURE: 136 MMHG | RESPIRATION RATE: 16 BRPM | TEMPERATURE: 98.2 F | WEIGHT: 196 LBS | BODY MASS INDEX: 31.5 KG/M2 | HEIGHT: 66 IN

## 2019-02-28 DIAGNOSIS — K29.50 CHRONIC GASTRITIS WITHOUT BLEEDING, UNSPECIFIED GASTRITIS TYPE: Primary | ICD-10-CM

## 2019-02-28 DIAGNOSIS — R10.84 GENERALIZED ABDOMINAL PAIN: Primary | ICD-10-CM

## 2019-02-28 LAB
ALBUMIN SERPL BCP-MCNC: 4.1 G/DL (ref 3.5–5)
ALP SERPL-CCNC: 101 U/L (ref 46–116)
ALT SERPL W P-5'-P-CCNC: 44 U/L (ref 12–78)
ANION GAP SERPL CALCULATED.3IONS-SCNC: 10 MMOL/L (ref 4–13)
AST SERPL W P-5'-P-CCNC: 31 U/L (ref 5–45)
BACTERIA UR QL AUTO: ABNORMAL /HPF
BASOPHILS # BLD AUTO: 0.04 THOUSANDS/ΜL (ref 0–0.1)
BASOPHILS NFR BLD AUTO: 1 % (ref 0–1)
BILIRUB SERPL-MCNC: 0.7 MG/DL (ref 0.2–1)
BILIRUB UR QL STRIP: NEGATIVE
BUN SERPL-MCNC: 14 MG/DL (ref 5–25)
CALCIUM SERPL-MCNC: 9.5 MG/DL (ref 8.3–10.1)
CHLORIDE SERPL-SCNC: 104 MMOL/L (ref 100–108)
CLARITY UR: CLEAR
CO2 SERPL-SCNC: 28 MMOL/L (ref 21–32)
COLOR UR: YELLOW
CREAT SERPL-MCNC: 0.82 MG/DL (ref 0.6–1.3)
EOSINOPHIL # BLD AUTO: 0.08 THOUSAND/ΜL (ref 0–0.61)
EOSINOPHIL NFR BLD AUTO: 1 % (ref 0–6)
ERYTHROCYTE [DISTWIDTH] IN BLOOD BY AUTOMATED COUNT: 12.3 % (ref 11.6–15.1)
GFR SERPL CREATININE-BSD FRML MDRD: 79 ML/MIN/1.73SQ M
GLUCOSE SERPL-MCNC: 91 MG/DL (ref 65–140)
GLUCOSE UR STRIP-MCNC: NEGATIVE MG/DL
HCT VFR BLD AUTO: 44.8 % (ref 34.8–46.1)
HGB BLD-MCNC: 15 G/DL (ref 11.5–15.4)
HGB UR QL STRIP.AUTO: NEGATIVE
HOLD SPECIMEN: NORMAL
IMM GRANULOCYTES # BLD AUTO: 0.02 THOUSAND/UL (ref 0–0.2)
IMM GRANULOCYTES NFR BLD AUTO: 0 % (ref 0–2)
KETONES UR STRIP-MCNC: NEGATIVE MG/DL
LEUKOCYTE ESTERASE UR QL STRIP: ABNORMAL
LIPASE SERPL-CCNC: 149 U/L (ref 73–393)
LYMPHOCYTES # BLD AUTO: 2.11 THOUSANDS/ΜL (ref 0.6–4.47)
LYMPHOCYTES NFR BLD AUTO: 32 % (ref 14–44)
MCH RBC QN AUTO: 29.2 PG (ref 26.8–34.3)
MCHC RBC AUTO-ENTMCNC: 33.5 G/DL (ref 31.4–37.4)
MCV RBC AUTO: 87 FL (ref 82–98)
MONOCYTES # BLD AUTO: 0.44 THOUSAND/ΜL (ref 0.17–1.22)
MONOCYTES NFR BLD AUTO: 7 % (ref 4–12)
NEUTROPHILS # BLD AUTO: 4 THOUSANDS/ΜL (ref 1.85–7.62)
NEUTS SEG NFR BLD AUTO: 59 % (ref 43–75)
NITRITE UR QL STRIP: NEGATIVE
NON-SQ EPI CELLS URNS QL MICRO: ABNORMAL /HPF
NRBC BLD AUTO-RTO: 0 /100 WBCS
PH UR STRIP.AUTO: 5 [PH] (ref 5–9)
PLATELET # BLD AUTO: 226 THOUSANDS/UL (ref 149–390)
PMV BLD AUTO: 11.9 FL (ref 8.9–12.7)
POTASSIUM SERPL-SCNC: 3.9 MMOL/L (ref 3.5–5.3)
PROT SERPL-MCNC: 7.7 G/DL (ref 6.4–8.2)
PROT UR STRIP-MCNC: NEGATIVE MG/DL
RBC # BLD AUTO: 5.13 MILLION/UL (ref 3.81–5.12)
RBC #/AREA URNS AUTO: ABNORMAL /HPF
SODIUM SERPL-SCNC: 142 MMOL/L (ref 136–145)
SP GR UR STRIP.AUTO: <=1.005 (ref 1–1.03)
UROBILINOGEN UR QL STRIP.AUTO: 0.2 E.U./DL
WBC # BLD AUTO: 6.69 THOUSAND/UL (ref 4.31–10.16)
WBC #/AREA URNS AUTO: ABNORMAL /HPF

## 2019-02-28 PROCEDURE — 85025 COMPLETE CBC W/AUTO DIFF WBC: CPT | Performed by: EMERGENCY MEDICINE

## 2019-02-28 PROCEDURE — 96361 HYDRATE IV INFUSION ADD-ON: CPT

## 2019-02-28 PROCEDURE — 96374 THER/PROPH/DIAG INJ IV PUSH: CPT

## 2019-02-28 PROCEDURE — 81001 URINALYSIS AUTO W/SCOPE: CPT | Performed by: EMERGENCY MEDICINE

## 2019-02-28 PROCEDURE — 74177 CT ABD & PELVIS W/CONTRAST: CPT

## 2019-02-28 PROCEDURE — 83690 ASSAY OF LIPASE: CPT | Performed by: EMERGENCY MEDICINE

## 2019-02-28 PROCEDURE — 36415 COLL VENOUS BLD VENIPUNCTURE: CPT | Performed by: EMERGENCY MEDICINE

## 2019-02-28 PROCEDURE — 80053 COMPREHEN METABOLIC PANEL: CPT | Performed by: EMERGENCY MEDICINE

## 2019-02-28 RX ORDER — ONDANSETRON 2 MG/ML
4 INJECTION INTRAMUSCULAR; INTRAVENOUS ONCE
Status: COMPLETED | OUTPATIENT
Start: 2019-02-28 | End: 2019-02-28

## 2019-02-28 RX ORDER — DICYCLOMINE HCL 20 MG
20 TABLET ORAL EVERY 6 HOURS
Qty: 20 TABLET | Refills: 0 | Status: SHIPPED | OUTPATIENT
Start: 2019-02-28 | End: 2019-05-13

## 2019-02-28 RX ADMIN — ONDANSETRON 4 MG: 2 INJECTION INTRAMUSCULAR; INTRAVENOUS at 00:40

## 2019-02-28 RX ADMIN — IOHEXOL 100 ML: 350 INJECTION, SOLUTION INTRAVENOUS at 01:25

## 2019-02-28 RX ADMIN — SODIUM CHLORIDE 1000 ML: 0.9 INJECTION, SOLUTION INTRAVENOUS at 00:28

## 2019-02-28 NOTE — ED PROVIDER NOTES
History  Chief Complaint   Patient presents with    Abdominal Pain     Patient comes tonight with pain in her abdomen,had diarrhea,nausea and cramping last week,went to PCP was put on meds but not getting any better     Pt in ER with c/o diffuse abdominal pain, now worse  Pt states that she had non bloody diarrhea for a few days last week, prior to onset of abdominal pain  +nausea, without vomiting  Pt was started on bentyl by a local Urgent Care, with some relief  She is concerned because although her diarrhea has resolved, abdominal pain persists after each meal  Pt has seen her PCP and was started on Lomotil and motrin  She denies fevers/chills  She denies sick contacts  Prior to Admission Medications   Prescriptions Last Dose Informant Patient Reported? Taking?    Omega-3 Fatty Acids (FISH OIL) 1,000 mg   Yes Yes   Sig: Take by mouth   Red Yeast Rice 600 MG CAPS   No Yes   Sig: Take 1 capsule (600 mg total) by mouth daily in the early morning   clotrimazole-betamethasone (LOTRISONE) 1-0 05 % cream   No Yes   Sig: Apply topically 2 (two) times a day   coenzyme Q-10 100 MG capsule   Yes Yes   Sig: Take by mouth   conjugated estrogens (PREMARIN) vaginal cream   Yes Yes   Sig: Insert into the vagina daily   diphenoxylate-atropine (LOMOTIL) 2 5-0 025 mg per tablet   No Yes   Sig: Take 1 tablet by mouth 4 (four) times a day as needed for diarrhea   esomeprazole (NexIUM) 40 MG capsule   No Yes   Sig: TAKE ONE CAPSULE BY MOUTH EVERY DAY   ibuprofen (MOTRIN) 600 mg tablet   Yes Yes   ondansetron (ZOFRAN) 4 mg tablet   No Yes   Sig: Take 1 tablet (4 mg total) by mouth every 8 (eight) hours as needed for nausea or vomiting      Facility-Administered Medications: None       Past Medical History:   Diagnosis Date    Breast lump     last assessed 8/24/10    GERD (gastroesophageal reflux disease)     Hyperlipidemia     Myalgia     last assessed  1/29/15    Myositis     last assessed  1/29/15    Pernicious anemia        Past Surgical History:   Procedure Laterality Date    CHOLECYSTECTOMY      GALLBLADDER SURGERY      HERNIA REPAIR      TUBAL LIGATION      URETHRAL SLING         Family History   Problem Relation Age of Onset    Atrial fibrillation Mother    [facility] Breast cancer Mother     Hypertension Mother     Hypertension Father     Aneurysm Sister         abdominal aortic    Cancer Maternal Grandmother      I have reviewed and agree with the history as documented  Social History     Tobacco Use    Smoking status: Former Smoker    Smokeless tobacco: Never Used    Tobacco comment: Quit at age 32   Substance Use Topics    Alcohol use: Yes     Comment: social    Drug use: No        Review of Systems   Constitutional: Negative for chills and fever  Respiratory: Negative for cough, chest tightness and shortness of breath  Gastrointestinal: Positive for abdominal pain and nausea  Negative for constipation, diarrhea, rectal pain and vomiting  Genitourinary: Negative for dysuria, frequency, hematuria and urgency  Musculoskeletal: Negative for back pain, neck pain and neck stiffness  Neurological: Negative for dizziness, facial asymmetry, weakness and headaches  Psychiatric/Behavioral: Negative for agitation, behavioral problems and confusion  All other systems reviewed and are negative  Physical Exam  Physical Exam   Constitutional: She is oriented to person, place, and time  She appears well-developed and well-nourished  No distress  HENT:   Head: Normocephalic and atraumatic  Eyes: Pupils are equal, round, and reactive to light  Conjunctivae and EOM are normal    Neck: Normal range of motion  Neck supple  Cardiovascular: Normal rate, regular rhythm and normal heart sounds  No murmur heard  Pulmonary/Chest: Effort normal and breath sounds normal  No respiratory distress  Abdominal: Soft  Bowel sounds are normal  She exhibits no distension and no ascites   There is generalized tenderness  There is no rigidity, no rebound, no guarding and no CVA tenderness  Musculoskeletal: Normal range of motion  She exhibits no edema or deformity  Neurological: She is alert and oriented to person, place, and time  No cranial nerve deficit  Skin: Skin is warm and dry  No rash noted  She is not diaphoretic  No pallor  Psychiatric: She has a normal mood and affect  Her behavior is normal    Nursing note and vitals reviewed        Vital Signs  ED Triage Vitals [02/28/19 0011]   Temperature Pulse Respirations Blood Pressure SpO2   98 2 °F (36 8 °C) (!) 53 16 136/81 99 %      Temp Source Heart Rate Source Patient Position - Orthostatic VS BP Location FiO2 (%)   Tympanic Monitor Lying Right arm --      Pain Score       --           Vitals:    02/28/19 0011   BP: 136/81   Pulse: (!) 53   Patient Position - Orthostatic VS: Lying       Visual Acuity      ED Medications  Medications   ondansetron (ZOFRAN) injection 4 mg (4 mg Intravenous Given 2/28/19 0040)   sodium chloride 0 9 % bolus 1,000 mL (0 mL Intravenous Stopped 2/28/19 0226)   iohexol (OMNIPAQUE) 350 MG/ML injection (MULTI-DOSE) 100 mL (100 mL Intravenous Given 2/28/19 0125)       Diagnostic Studies  Results Reviewed     Procedure Component Value Units Date/Time    Comprehensive metabolic panel [[id number]] Collected:  02/28/19 0022    Lab Status:  Final result Specimen:  Blood from Arm, Left Updated:  02/28/19 0048     Sodium 142 mmol/L      Potassium 3 9 mmol/L      Chloride 104 mmol/L      CO2 28 mmol/L      ANION GAP 10 mmol/L      BUN 14 mg/dL      Creatinine 0 82 mg/dL      Glucose 91 mg/dL      Calcium 9 5 mg/dL      AST 31 U/L      ALT 44 U/L      Alkaline Phosphatase 101 U/L      Total Protein 7 7 g/dL      Albumin 4 1 g/dL      Total Bilirubin 0 70 mg/dL      eGFR 79 ml/min/1 73sq m     Narrative:       National Kidney Disease Education Program recommendations are as follows:  GFR calculation is accurate only with a steady state creatinine  Chronic Kidney disease less than 60 ml/min/1 73 sq  meters  Kidney failure less than 15 ml/min/1 73 sq  meters      Lipase [[id number]]  (Normal) Collected:  02/28/19 0022    Lab Status:  Final result Specimen:  Blood from Arm, Left Updated:  02/28/19 0048     Lipase 149 u/L     Urine Microscopic [[id number]]  (Abnormal) Collected:  02/28/19 0022    Lab Status:  Final result Specimen:  Urine, Clean Catch Updated:  02/28/19 0039     RBC, UA 0-1 /hpf      WBC, UA 4-10 /hpf      Epithelial Cells Occasional /hpf      Bacteria, UA Occasional /hpf     UA w Reflex to Microscopic [[id number]]  (Abnormal) Collected:  02/28/19 0022    Lab Status:  Final result Specimen:  Urine, Clean Catch Updated:  02/28/19 0033     Color, UA Yellow     Clarity, UA Clear     Specific Gravity, UA <=1 005     pH, UA 5 0     Leukocytes, UA Small     Nitrite, UA Negative     Protein, UA Negative mg/dl      Glucose, UA Negative mg/dl      Ketones, UA Negative mg/dl      Urobilinogen, UA 0 2 E U /dl      Bilirubin, UA Negative     Blood, UA Negative    CBC and differential [[id number]]  (Abnormal) Collected:  02/28/19 0022    Lab Status:  Final result Specimen:  Blood from Arm, Left Updated:  02/28/19 0032     WBC 6 69 Thousand/uL      RBC 5 13 Million/uL      Hemoglobin 15 0 g/dL      Hematocrit 44 8 %      MCV 87 fL      MCH 29 2 pg      MCHC 33 5 g/dL      RDW 12 3 %      MPV 11 9 fL      Platelets 757 Thousands/uL      nRBC 0 /100 WBCs      Neutrophils Relative 59 %      Immat GRANS % 0 %      Lymphocytes Relative 32 %      Monocytes Relative 7 %      Eosinophils Relative 1 %      Basophils Relative 1 %      Neutrophils Absolute 4 00 Thousands/µL      Immature Grans Absolute 0 02 Thousand/uL      Lymphocytes Absolute 2 11 Thousands/µL      Monocytes Absolute 0 44 Thousand/µL      Eosinophils Absolute 0 08 Thousand/µL      Basophils Absolute 0 04 Thousands/µL                  CT abdomen pelvis with contrast   Final Result by Traustin Villasenor, MD (02/28 0134)      No acute pathology visualized on CT of the abdomen and pelvis with IV without oral contrast       Workstation performed: [id number]                    Procedures  Procedures       Phone Contacts  ED Phone Contact    ED Course                               MDM  Number of Diagnoses or Management Options  Generalized abdominal pain:   Diagnosis management comments: Pt denies urinary symptoms  Will d/c to home with PCP f/u  Pt is in NAD, resting comfortably in ED  Amount and/or Complexity of Data Reviewed  Clinical lab tests: ordered and reviewed  Tests in the radiology section of CPT®: ordered and reviewed    Risk of Complications, Morbidity, and/or Mortality  Presenting problems: moderate  Diagnostic procedures: moderate  Management options: moderate    Patient Progress  Patient progress: stable      Disposition  Final diagnoses:   Generalized abdominal pain     Time reflects when diagnosis was documented in both MDM as applicable and the Disposition within this note     Time User Action Codes Description Comment    2/28/2019  1:59 AM Miguel Boone Add [R10 84] Generalized abdominal pain       ED Disposition     ED Disposition Condition Date/Time Comment    Discharge Stable Thu Feb 28, 2019  1:58 AM Sherine Moore discharge to home/self care              Follow-up Information     Follow up With Specialties Details Why Contact Molly Velasquez DO Family Medicine Schedule an appointment as soon as possible for a visit in 1 day for follow up [id number] [facility]  [address]  [phone]            Discharge Medication List as of 2/28/2019  1:59 AM      CONTINUE these medications which have NOT CHANGED    Details   clotrimazole-betamethasone (LOTRISONE) 1-0 05 % cream Apply topically 2 (two) times a day, Starting Mon 10/8/2018, Normal      coenzyme Q-10 100 MG capsule Take by mouth, Starting Thu 9/28/2017, Historical Med      conjugated estrogens (PREMARIN) vaginal cream Insert into the vagina daily, Historical Med      diphenoxylate-atropine (LOMOTIL) 2 5-0 025 mg per tablet Take 1 tablet by mouth 4 (four) times a day as needed for diarrhea, Starting Wed 2/27/2019, Normal      esomeprazole (NexIUM) 40 MG capsule TAKE ONE CAPSULE BY MOUTH EVERY DAY, Normal      ibuprofen (MOTRIN) 600 mg tablet Starting Fri 1/11/2019, Historical Med      Omega-3 Fatty Acids (FISH OIL) 1,000 mg Take by mouth, Starting Thu 9/28/2017, Historical Med      ondansetron (ZOFRAN) 4 mg tablet Take 1 tablet (4 mg total) by mouth every 8 (eight) hours as needed for nausea or vomiting, Starting Wed 2/27/2019, Normal      Red Yeast Rice 600 MG CAPS Take 1 capsule (600 mg total) by mouth daily in the early morning, Starting Tue 5/8/2018, No Print           No discharge procedures on file      ED Provider  Electronically Signed by           Jaymie Alejandre DO  02/28/19 9081

## 2019-02-28 NOTE — TELEPHONE ENCOUNTER
Patient called and advised she would like to have the Dicyclomine 20 mg   The medication you suggested caused nausea and patient ended up going to the ER  They did testing and all was negative    Patient would like the Dicyclomine today as she is leaving for [place] in the AM

## 2019-03-01 ENCOUNTER — OFFICE VISIT (OUTPATIENT)
Dept: OBGYN CLINIC | Facility: OTHER | Age: 59
End: 2019-03-01
Payer: COMMERCIAL

## 2019-03-01 VITALS
BODY MASS INDEX: 34.72 KG/M2 | HEIGHT: 63 IN | HEART RATE: 88 BPM | SYSTOLIC BLOOD PRESSURE: 137 MMHG | DIASTOLIC BLOOD PRESSURE: 81 MMHG

## 2019-03-01 DIAGNOSIS — S83.241A TEAR OF MEDIAL MENISCUS OF RIGHT KNEE, CURRENT, UNSPECIFIED TEAR TYPE, INITIAL ENCOUNTER: ICD-10-CM

## 2019-03-01 DIAGNOSIS — M17.11 PRIMARY OSTEOARTHRITIS OF RIGHT KNEE: Primary | ICD-10-CM

## 2019-03-01 PROCEDURE — 20610 DRAIN/INJ JOINT/BURSA W/O US: CPT | Performed by: ORTHOPAEDIC SURGERY

## 2019-03-01 PROCEDURE — 99213 OFFICE O/P EST LOW 20 MIN: CPT | Performed by: ORTHOPAEDIC SURGERY

## 2019-03-01 RX ORDER — BUPIVACAINE HYDROCHLORIDE 2.5 MG/ML
4 INJECTION, SOLUTION INFILTRATION; PERINEURAL
Status: COMPLETED | OUTPATIENT
Start: 2019-03-01 | End: 2019-03-01

## 2019-03-01 RX ORDER — METHYLPREDNISOLONE ACETATE 40 MG/ML
1 INJECTION, SUSPENSION INTRA-ARTICULAR; INTRALESIONAL; INTRAMUSCULAR; SOFT TISSUE
Status: COMPLETED | OUTPATIENT
Start: 2019-03-01 | End: 2019-03-01

## 2019-03-01 RX ADMIN — BUPIVACAINE HYDROCHLORIDE 4 ML: 2.5 INJECTION, SOLUTION INFILTRATION; PERINEURAL at 07:33

## 2019-03-01 RX ADMIN — METHYLPREDNISOLONE ACETATE 1 ML: 40 INJECTION, SUSPENSION INTRA-ARTICULAR; INTRALESIONAL; INTRAMUSCULAR; SOFT TISSUE at 07:33

## 2019-03-01 NOTE — PATIENT INSTRUCTIONS

## 2019-03-01 NOTE — PROGRESS NOTES
Patient Name:  Mckinley Figueredo  MRN:  [id number]    Assessment & Plan     Right knee moderate to severe patellofemoral osteoarthritis, posterior root medial meniscal tear  1  Patient was given cortisone injection today in the office as requested  2  She was instructed to ice and take Tylenol p r n  for pain relief  3  She will follow up on as-needed basis  Subjective     55-year-old female returns to the office today for follow-up visit for her right knee  Patient has been treating conservatively for moderate patellofemoral osteoarthritis as well as a posterior root tear of the medial meniscus  Patient has tried conservative treatment such as ibuprofen, activity modification and ice with minimal success  She is requesting a cortisone injection today in the office  She states that her symptoms are worse with prolonged walking and standing, as well as ambulating up and down stairs  General ROS:  Negative for fever or chills  Neurological ROS:  Negative for numbness or tingling        Objective     /81   Pulse 88   Ht 5' 3" (1 6 m)   LMP 01/01/2016   BMI 34 72 kg/m²     Right knee:  Soft tissue swelling:  None  Effusion:  None  Tenderness to palpation:  Medial joint line  Range of motion:   Extension:  0   Flexion:  120  Lachman test:  Stable  Valgus stress:  Stable  Varus stress:  Stable  Bob's test:  Negative      Large joint arthrocentesis: R knee  Date/Time: 3/1/2019 7:33 AM  Consent given by: patient  Site marked: site marked  Timeout: Immediately prior to procedure a time out was called to verify the correct patient, procedure, equipment, support staff and site/side marked as required   Supporting Documentation  Indications: pain and diagnostic evaluation   Procedure Details  Location: knee - R knee  Preparation: Patient was prepped and draped in the usual sterile fashion  Needle size: 22 G  Ultrasound guidance: no  Approach: anterolateral  Medications administered: 1 mL methylPREDNISolone acetate 40 mg/mL; 4 mL bupivacaine 0 25 %    Patient tolerance: patient tolerated the procedure well with no immediate complications  Dressing:  Sterile dressing applied            Scribe Attestation    I,:   Maddie Salcedo am acting as a scribe while in the presence of the attending physician :        I,:   Severiano Coronel MD personally performed the services described in this documentation    as scribed in my presence :

## 2019-05-10 ENCOUNTER — OFFICE VISIT (OUTPATIENT)
Dept: OBGYN CLINIC | Facility: OTHER | Age: 59
End: 2019-05-10
Payer: COMMERCIAL

## 2019-05-10 VITALS
HEIGHT: 63 IN | HEART RATE: 77 BPM | SYSTOLIC BLOOD PRESSURE: 123 MMHG | WEIGHT: 195 LBS | BODY MASS INDEX: 34.55 KG/M2 | DIASTOLIC BLOOD PRESSURE: 84 MMHG

## 2019-05-10 DIAGNOSIS — M54.16 RADICULOPATHY OF LUMBAR REGION: ICD-10-CM

## 2019-05-10 DIAGNOSIS — S83.221A PERIPHERAL TEAR OF MEDIAL MENISCUS OF RIGHT KNEE AS CURRENT INJURY, INITIAL ENCOUNTER: Primary | ICD-10-CM

## 2019-05-10 PROCEDURE — 99214 OFFICE O/P EST MOD 30 MIN: CPT | Performed by: ORTHOPAEDIC SURGERY

## 2019-05-10 RX ORDER — CHLORHEXIDINE GLUCONATE 4 G/100ML
SOLUTION TOPICAL DAILY PRN
Status: CANCELLED | OUTPATIENT
Start: 2019-05-10

## 2019-05-13 ENCOUNTER — OFFICE VISIT (OUTPATIENT)
Dept: FAMILY MEDICINE CLINIC | Facility: CLINIC | Age: 59
End: 2019-05-13
Payer: COMMERCIAL

## 2019-05-13 VITALS
SYSTOLIC BLOOD PRESSURE: 118 MMHG | HEIGHT: 63 IN | DIASTOLIC BLOOD PRESSURE: 84 MMHG | WEIGHT: 195 LBS | TEMPERATURE: 97.6 F | RESPIRATION RATE: 18 BRPM | BODY MASS INDEX: 34.55 KG/M2 | HEART RATE: 89 BPM | OXYGEN SATURATION: 98 %

## 2019-05-13 DIAGNOSIS — M79.672 PAIN IN BOTH FEET: ICD-10-CM

## 2019-05-13 DIAGNOSIS — M79.671 PAIN IN BOTH FEET: ICD-10-CM

## 2019-05-13 DIAGNOSIS — M54.50 CHRONIC BILATERAL LOW BACK PAIN WITHOUT SCIATICA: ICD-10-CM

## 2019-05-13 DIAGNOSIS — G89.29 CHRONIC BILATERAL LOW BACK PAIN WITHOUT SCIATICA: ICD-10-CM

## 2019-05-13 DIAGNOSIS — R20.2 PARESTHESIA OF BILATERAL LEGS: ICD-10-CM

## 2019-05-13 DIAGNOSIS — M54.16 RADICULOPATHY OF LUMBAR REGION: Primary | ICD-10-CM

## 2019-05-13 DIAGNOSIS — G57.12 MERALGIA PARESTHETICA, LEFT LOWER LIMB: ICD-10-CM

## 2019-05-13 PROCEDURE — 3008F BODY MASS INDEX DOCD: CPT | Performed by: FAMILY MEDICINE

## 2019-05-13 PROCEDURE — 99214 OFFICE O/P EST MOD 30 MIN: CPT | Performed by: FAMILY MEDICINE

## 2019-05-13 RX ORDER — MINOCYCLINE HYDROCHLORIDE 50 MG/1
50 CAPSULE ORAL DAILY
COMMUNITY
Start: 2019-03-30 | End: 2019-08-23

## 2019-05-15 DIAGNOSIS — L71.9 ROSACEA: ICD-10-CM

## 2019-05-15 PROBLEM — G57.12 MERALGIA PARESTHETICA, LEFT LOWER LIMB: Status: RESOLVED | Noted: 2017-02-27 | Resolved: 2019-05-15

## 2019-05-15 RX ORDER — MINOCYCLINE HYDROCHLORIDE 50 MG/1
CAPSULE ORAL
Qty: 30 CAPSULE | Refills: 3 | OUTPATIENT
Start: 2019-05-15

## 2019-05-23 ENCOUNTER — TELEPHONE (OUTPATIENT)
Dept: OBGYN CLINIC | Facility: HOSPITAL | Age: 59
End: 2019-05-23

## 2019-05-29 ENCOUNTER — TRANSCRIBE ORDERS (OUTPATIENT)
Dept: LAB | Facility: CLINIC | Age: 59
End: 2019-05-29

## 2019-05-29 ENCOUNTER — APPOINTMENT (OUTPATIENT)
Dept: LAB | Facility: CLINIC | Age: 59
End: 2019-05-29
Payer: COMMERCIAL

## 2019-05-29 ENCOUNTER — OFFICE VISIT (OUTPATIENT)
Dept: LAB | Facility: CLINIC | Age: 59
End: 2019-05-29
Payer: COMMERCIAL

## 2019-05-29 DIAGNOSIS — S83.221A PERIPHERAL TEAR OF MEDIAL MENISCUS OF RIGHT KNEE AS CURRENT INJURY, INITIAL ENCOUNTER: ICD-10-CM

## 2019-05-29 LAB
ANION GAP SERPL CALCULATED.3IONS-SCNC: 7 MMOL/L (ref 4–13)
BASOPHILS # BLD AUTO: 0.03 THOUSANDS/ΜL (ref 0–0.1)
BASOPHILS NFR BLD AUTO: 1 % (ref 0–1)
BUN SERPL-MCNC: 13 MG/DL (ref 5–25)
CALCIUM SERPL-MCNC: 9.3 MG/DL (ref 8.3–10.1)
CHLORIDE SERPL-SCNC: 104 MMOL/L (ref 100–108)
CO2 SERPL-SCNC: 29 MMOL/L (ref 21–32)
CREAT SERPL-MCNC: 0.84 MG/DL (ref 0.6–1.3)
EOSINOPHIL # BLD AUTO: 0.12 THOUSAND/ΜL (ref 0–0.61)
EOSINOPHIL NFR BLD AUTO: 2 % (ref 0–6)
ERYTHROCYTE [DISTWIDTH] IN BLOOD BY AUTOMATED COUNT: 12.9 % (ref 11.6–15.1)
GFR SERPL CREATININE-BSD FRML MDRD: 77 ML/MIN/1.73SQ M
GLUCOSE SERPL-MCNC: 86 MG/DL (ref 65–140)
HCT VFR BLD AUTO: 44.2 % (ref 34.8–46.1)
HGB BLD-MCNC: 14.4 G/DL (ref 11.5–15.4)
IMM GRANULOCYTES # BLD AUTO: 0.02 THOUSAND/UL (ref 0–0.2)
IMM GRANULOCYTES NFR BLD AUTO: 0 % (ref 0–2)
LYMPHOCYTES # BLD AUTO: 2.06 THOUSANDS/ΜL (ref 0.6–4.47)
LYMPHOCYTES NFR BLD AUTO: 31 % (ref 14–44)
MCH RBC QN AUTO: 28.6 PG (ref 26.8–34.3)
MCHC RBC AUTO-ENTMCNC: 32.6 G/DL (ref 31.4–37.4)
MCV RBC AUTO: 88 FL (ref 82–98)
MONOCYTES # BLD AUTO: 0.47 THOUSAND/ΜL (ref 0.17–1.22)
MONOCYTES NFR BLD AUTO: 7 % (ref 4–12)
NEUTROPHILS # BLD AUTO: 3.88 THOUSANDS/ΜL (ref 1.85–7.62)
NEUTS SEG NFR BLD AUTO: 59 % (ref 43–75)
NRBC BLD AUTO-RTO: 0 /100 WBCS
PLATELET # BLD AUTO: 214 THOUSANDS/UL (ref 149–390)
PMV BLD AUTO: 11.9 FL (ref 8.9–12.7)
POTASSIUM SERPL-SCNC: 3.7 MMOL/L (ref 3.5–5.3)
RBC # BLD AUTO: 5.03 MILLION/UL (ref 3.81–5.12)
SODIUM SERPL-SCNC: 140 MMOL/L (ref 136–145)
WBC # BLD AUTO: 6.58 THOUSAND/UL (ref 4.31–10.16)

## 2019-05-29 PROCEDURE — 93005 ELECTROCARDIOGRAM TRACING: CPT

## 2019-05-29 PROCEDURE — 80048 BASIC METABOLIC PNL TOTAL CA: CPT

## 2019-05-29 PROCEDURE — 85025 COMPLETE CBC W/AUTO DIFF WBC: CPT

## 2019-05-29 PROCEDURE — 36415 COLL VENOUS BLD VENIPUNCTURE: CPT

## 2019-05-30 LAB
ATRIAL RATE: 66 BPM
P AXIS: 52 DEGREES
PR INTERVAL: 190 MS
QRS AXIS: -22 DEGREES
QRSD INTERVAL: 98 MS
QT INTERVAL: 400 MS
QTC INTERVAL: 419 MS
T WAVE AXIS: 32 DEGREES
VENTRICULAR RATE: 66 BPM

## 2019-05-30 PROCEDURE — 93010 ELECTROCARDIOGRAM REPORT: CPT | Performed by: INTERNAL MEDICINE

## 2019-06-02 ENCOUNTER — ANESTHESIA EVENT (OUTPATIENT)
Dept: PERIOP | Facility: HOSPITAL | Age: 59
End: 2019-06-02
Payer: COMMERCIAL

## 2019-06-03 ENCOUNTER — ANESTHESIA (OUTPATIENT)
Dept: PERIOP | Facility: HOSPITAL | Age: 59
End: 2019-06-03
Payer: COMMERCIAL

## 2019-06-03 ENCOUNTER — HOSPITAL ENCOUNTER (OUTPATIENT)
Facility: HOSPITAL | Age: 59
Setting detail: OUTPATIENT SURGERY
Discharge: HOME | End: 2019-06-03
Attending: ORTHOPAEDIC SURGERY | Admitting: ORTHOPAEDIC SURGERY
Payer: COMMERCIAL

## 2019-06-03 VITALS
HEIGHT: 64 IN | OXYGEN SATURATION: 98 % | HEART RATE: 68 BPM | TEMPERATURE: 97.6 F | DIASTOLIC BLOOD PRESSURE: 68 MMHG | SYSTOLIC BLOOD PRESSURE: 119 MMHG | RESPIRATION RATE: 16 BRPM | BODY MASS INDEX: 32.78 KG/M2 | WEIGHT: 192 LBS

## 2019-06-03 DIAGNOSIS — S83.221A PERIPHERAL TEAR OF MEDIAL MENISCUS OF RIGHT KNEE AS CURRENT INJURY, INITIAL ENCOUNTER: Primary | ICD-10-CM

## 2019-06-03 PROCEDURE — 29881 ARTHRS KNE SRG MNISECTMY M/L: CPT | Performed by: ORTHOPAEDIC SURGERY

## 2019-06-03 RX ORDER — FENTANYL CITRATE/PF 50 MCG/ML
50 SYRINGE (ML) INJECTION
Status: DISCONTINUED | OUTPATIENT
Start: 2019-06-03 | End: 2019-06-03 | Stop reason: HOSPADM

## 2019-06-03 RX ORDER — ONDANSETRON 2 MG/ML
4 INJECTION INTRAMUSCULAR; INTRAVENOUS ONCE AS NEEDED
Status: COMPLETED | OUTPATIENT
Start: 2019-06-03 | End: 2019-06-03

## 2019-06-03 RX ORDER — OXYCODONE HYDROCHLORIDE 5 MG/1
10 TABLET ORAL EVERY 4 HOURS PRN
Status: DISCONTINUED | OUTPATIENT
Start: 2019-06-03 | End: 2019-06-03 | Stop reason: HOSPADM

## 2019-06-03 RX ORDER — OXYCODONE HYDROCHLORIDE 5 MG/1
5 TABLET ORAL EVERY 4 HOURS PRN
Qty: 10 TABLET | Refills: 0 | Status: SHIPPED | OUTPATIENT
Start: 2019-06-03 | End: 2019-06-14

## 2019-06-03 RX ORDER — LIDOCAINE HYDROCHLORIDE 10 MG/ML
INJECTION, SOLUTION INFILTRATION; PERINEURAL AS NEEDED
Status: DISCONTINUED | OUTPATIENT
Start: 2019-06-03 | End: 2019-06-03 | Stop reason: SURG

## 2019-06-03 RX ORDER — PROPOFOL 10 MG/ML
INJECTION, EMULSION INTRAVENOUS AS NEEDED
Status: DISCONTINUED | OUTPATIENT
Start: 2019-06-03 | End: 2019-06-03 | Stop reason: SURG

## 2019-06-03 RX ORDER — FENTANYL CITRATE 50 UG/ML
INJECTION, SOLUTION INTRAMUSCULAR; INTRAVENOUS AS NEEDED
Status: DISCONTINUED | OUTPATIENT
Start: 2019-06-03 | End: 2019-06-03 | Stop reason: SURG

## 2019-06-03 RX ORDER — CEFAZOLIN SODIUM 2 G/50ML
2000 SOLUTION INTRAVENOUS ONCE
Status: DISCONTINUED | OUTPATIENT
Start: 2019-06-03 | End: 2019-06-03 | Stop reason: HOSPADM

## 2019-06-03 RX ORDER — ONDANSETRON 2 MG/ML
4 INJECTION INTRAMUSCULAR; INTRAVENOUS EVERY 8 HOURS PRN
Status: DISCONTINUED | OUTPATIENT
Start: 2019-06-03 | End: 2019-06-03 | Stop reason: HOSPADM

## 2019-06-03 RX ORDER — HYDROMORPHONE HCL/PF 1 MG/ML
0.2 SYRINGE (ML) INJECTION
Status: DISCONTINUED | OUTPATIENT
Start: 2019-06-03 | End: 2019-06-03 | Stop reason: HOSPADM

## 2019-06-03 RX ORDER — ACETAMINOPHEN 325 MG/1
650 TABLET ORAL EVERY 4 HOURS PRN
Status: DISCONTINUED | OUTPATIENT
Start: 2019-06-03 | End: 2019-06-03 | Stop reason: HOSPADM

## 2019-06-03 RX ORDER — SODIUM CHLORIDE, SODIUM LACTATE, POTASSIUM CHLORIDE, CALCIUM CHLORIDE 600; 310; 30; 20 MG/100ML; MG/100ML; MG/100ML; MG/100ML
125 INJECTION, SOLUTION INTRAVENOUS CONTINUOUS
Status: DISCONTINUED | OUTPATIENT
Start: 2019-06-03 | End: 2019-06-03 | Stop reason: HOSPADM

## 2019-06-03 RX ORDER — METOCLOPRAMIDE HYDROCHLORIDE 5 MG/ML
10 INJECTION INTRAMUSCULAR; INTRAVENOUS ONCE AS NEEDED
Status: COMPLETED | OUTPATIENT
Start: 2019-06-03 | End: 2019-06-03

## 2019-06-03 RX ORDER — KETOROLAC TROMETHAMINE 30 MG/ML
INJECTION, SOLUTION INTRAMUSCULAR; INTRAVENOUS AS NEEDED
Status: DISCONTINUED | OUTPATIENT
Start: 2019-06-03 | End: 2019-06-03 | Stop reason: SURG

## 2019-06-03 RX ORDER — ACETAMINOPHEN 325 MG/1
650 TABLET ORAL EVERY 6 HOURS PRN
COMMUNITY
End: 2020-04-27

## 2019-06-03 RX ORDER — DEXAMETHASONE SODIUM PHOSPHATE 4 MG/ML
INJECTION, SOLUTION INTRA-ARTICULAR; INTRALESIONAL; INTRAMUSCULAR; INTRAVENOUS; SOFT TISSUE AS NEEDED
Status: DISCONTINUED | OUTPATIENT
Start: 2019-06-03 | End: 2019-06-03 | Stop reason: SURG

## 2019-06-03 RX ORDER — LIDOCAINE HYDROCHLORIDE 10 MG/ML
0.5 INJECTION, SOLUTION EPIDURAL; INFILTRATION; INTRACAUDAL; PERINEURAL ONCE AS NEEDED
Status: DISCONTINUED | OUTPATIENT
Start: 2019-06-03 | End: 2019-06-03 | Stop reason: HOSPADM

## 2019-06-03 RX ORDER — IBUPROFEN 600 MG/1
600 TABLET ORAL EVERY 6 HOURS PRN
Qty: 30 TABLET | Refills: 0 | Status: SHIPPED | OUTPATIENT
Start: 2019-06-03 | End: 2020-06-29

## 2019-06-03 RX ORDER — CHLORHEXIDINE GLUCONATE 4 G/100ML
SOLUTION TOPICAL DAILY PRN
Status: DISCONTINUED | OUTPATIENT
Start: 2019-06-03 | End: 2019-06-03 | Stop reason: HOSPADM

## 2019-06-03 RX ORDER — ONDANSETRON 2 MG/ML
INJECTION INTRAMUSCULAR; INTRAVENOUS AS NEEDED
Status: DISCONTINUED | OUTPATIENT
Start: 2019-06-03 | End: 2019-06-03 | Stop reason: SURG

## 2019-06-03 RX ORDER — MORPHINE SULFATE 10 MG/ML
2 INJECTION, SOLUTION INTRAMUSCULAR; INTRAVENOUS EVERY 2 HOUR PRN
Status: DISCONTINUED | OUTPATIENT
Start: 2019-06-03 | End: 2019-06-03 | Stop reason: HOSPADM

## 2019-06-03 RX ORDER — MIDAZOLAM HYDROCHLORIDE 1 MG/ML
INJECTION INTRAMUSCULAR; INTRAVENOUS AS NEEDED
Status: DISCONTINUED | OUTPATIENT
Start: 2019-06-03 | End: 2019-06-03 | Stop reason: SURG

## 2019-06-03 RX ADMIN — FENTANYL CITRATE 50 MCG: 50 INJECTION, SOLUTION INTRAMUSCULAR; INTRAVENOUS at 12:09

## 2019-06-03 RX ADMIN — ONDANSETRON 4 MG: 2 INJECTION INTRAMUSCULAR; INTRAVENOUS at 10:50

## 2019-06-03 RX ADMIN — KETOROLAC TROMETHAMINE 30 MG: 30 INJECTION, SOLUTION INTRAMUSCULAR at 11:27

## 2019-06-03 RX ADMIN — SODIUM CHLORIDE, SODIUM LACTATE, POTASSIUM CHLORIDE, AND CALCIUM CHLORIDE: .6; .31; .03; .02 INJECTION, SOLUTION INTRAVENOUS at 10:50

## 2019-06-03 RX ADMIN — PROPOFOL 200 MG: 10 INJECTION, EMULSION INTRAVENOUS at 10:51

## 2019-06-03 RX ADMIN — ONDANSETRON 4 MG: 2 INJECTION INTRAMUSCULAR; INTRAVENOUS at 12:01

## 2019-06-03 RX ADMIN — FENTANYL CITRATE 50 MCG: 50 INJECTION INTRAMUSCULAR; INTRAVENOUS at 10:58

## 2019-06-03 RX ADMIN — FENTANYL CITRATE 50 MCG: 50 INJECTION INTRAMUSCULAR; INTRAVENOUS at 10:51

## 2019-06-03 RX ADMIN — ACETAMINOPHEN 650 MG: 325 TABLET, FILM COATED ORAL at 12:48

## 2019-06-03 RX ADMIN — FENTANYL CITRATE 50 MCG: 50 INJECTION, SOLUTION INTRAMUSCULAR; INTRAVENOUS at 12:03

## 2019-06-03 RX ADMIN — MIDAZOLAM HYDROCHLORIDE 2 MG: 1 INJECTION, SOLUTION INTRAMUSCULAR; INTRAVENOUS at 10:50

## 2019-06-03 RX ADMIN — METOCLOPRAMIDE 10 MG: 5 INJECTION, SOLUTION INTRAMUSCULAR; INTRAVENOUS at 12:11

## 2019-06-03 RX ADMIN — DEXAMETHASONE SODIUM PHOSPHATE 8 MG: 4 INJECTION, SOLUTION INTRAMUSCULAR; INTRAVENOUS at 11:05

## 2019-06-03 RX ADMIN — LIDOCAINE HYDROCHLORIDE ANHYDROUS 50 MG: 10 INJECTION, SOLUTION INFILTRATION at 10:51

## 2019-06-14 ENCOUNTER — OFFICE VISIT (OUTPATIENT)
Dept: OBGYN CLINIC | Facility: CLINIC | Age: 59
End: 2019-06-14

## 2019-06-14 VITALS
HEART RATE: 94 BPM | BODY MASS INDEX: 34.01 KG/M2 | SYSTOLIC BLOOD PRESSURE: 108 MMHG | HEIGHT: 63 IN | DIASTOLIC BLOOD PRESSURE: 73 MMHG

## 2019-06-14 DIAGNOSIS — S83.221A PERIPHERAL TEAR OF MEDIAL MENISCUS OF RIGHT KNEE AS CURRENT INJURY, INITIAL ENCOUNTER: Primary | ICD-10-CM

## 2019-06-14 PROCEDURE — 99024 POSTOP FOLLOW-UP VISIT: CPT | Performed by: ORTHOPAEDIC SURGERY

## 2019-07-16 ENCOUNTER — OFFICE VISIT (OUTPATIENT)
Dept: OBGYN CLINIC | Facility: CLINIC | Age: 59
End: 2019-07-16

## 2019-07-16 VITALS
HEART RATE: 92 BPM | SYSTOLIC BLOOD PRESSURE: 142 MMHG | BODY MASS INDEX: 35.26 KG/M2 | HEIGHT: 63 IN | WEIGHT: 199 LBS | DIASTOLIC BLOOD PRESSURE: 94 MMHG

## 2019-07-16 DIAGNOSIS — M17.11 PRIMARY OSTEOARTHRITIS OF RIGHT KNEE: ICD-10-CM

## 2019-07-16 DIAGNOSIS — S83.221A PERIPHERAL TEAR OF MEDIAL MENISCUS OF RIGHT KNEE AS CURRENT INJURY, INITIAL ENCOUNTER: Primary | ICD-10-CM

## 2019-07-16 PROCEDURE — 99024 POSTOP FOLLOW-UP VISIT: CPT | Performed by: ORTHOPAEDIC SURGERY

## 2019-07-16 NOTE — LETTER
July 16, 2019     Patient: Meena Bender   YOB: 1960   Date of Visit: 7/16/2019       To Whom it May Concern:    Yasmin Brown is under my professional care  She was seen in my office on 7/16/2019  She may return to work full duty without restrictions on 7/22/19  If you have any questions or concerns, please don't hesitate to call           Sincerely,          Romina Rodriguez PA-C

## 2019-07-16 NOTE — PROGRESS NOTES
Patient Name:  Patricia Rose  MRN:  [id number]    Assessment     1  Peripheral tear of medial meniscus of right knee as current injury, initial encounter     2  Primary osteoarthritis of right knee         Plan     Right knee arthroscopic partial medial meniscectomy, chondroplasty 6/3/19  1  Gradually return to normal activities as tolerated  2  Anti-inflammatories as needed  3  Knee pad and lidocaine gel recommended for kneeling  4  Cleared to return to work 7/22/19  Note provided  5  Follow-up as needed  Consider steroid injection if pain persists    Subjective     77-year-old female returns to the office today status post Right knee arthroscopic partial medial meniscectomy, chondroplasty 6/3/19  Today she does note persistent discomfort and soreness localized to the medial aspect of the  She does note mild swelling as well  She notes swelling in the posterior aspect of the she does note some residual weakness which is improving with exercises  She denies any instability  No numbness or tingling  She does take over-the-counter anti-inflammatories on occasion  No numbness or tingling  No fevers or chills  Objective     /94   Pulse 92   Ht 5' 3" (1 6 m)   Wt 90 3 kg (199 lb)   LMP 01/01/2016   BMI 35 25 kg/m²     Right knee:  No gross deformity  Skin intact  No erythema ecchymosis or swelling  Trace effusion  No significant tenderness to palpation medial or lateral joint line  Range of motion includes full extension and flexion to 120°  Stable to varus and valgus stress  Stable Lachman test   Negative Bob's test   Sensation intact right lower extremity  Skin warm and well perfused      Scribe Attestation    I,:   Maye Davis PA-C am acting as a scribe while in the presence of the attending physician :        I,:   Thao Rosales MD personally performed the services described in this documentation    as scribed in my presence :

## 2019-07-16 NOTE — PROGRESS NOTES
Assessment:  No diagnosis found  Surgery: Right Knee Arthroscopic Partial Medial Meniscectomy; Chondroplasty - Right  Date of Surgery: 6/3/2019        Discussion and Plan:       Follow Up:  No follow-ups on file  CHIEF COMPLAINT:  Chief Complaint   Patient presents with    Right Knee - Post-op         SUBJECTIVE:  Jame Shah is a 62y o  year old female who presents for follow up after Right Knee Arthroscopic Partial Medial Meniscectomy; Chondroplasty - Right 6/03/19  Today patient has {Complaint:48345}  PHYSICAL EXAMINATION:  General: {1234:82862::"well developed and well nourished","alert","oriented times 3","appears comfortable"}  Psychiatric: {Psych:99337::"Normal"}    MUSCULOSKELETAL EXAMINATION:  Incision: {post op incision:30212::"Clean, dry, intact"}  Surgery Site: {post op incision:53044}  Range of Motion: {post op rom:90055::"As expected"}  Neurovascular status: {post op neuro CIBV:61534::"TSNUP intact, good cap refill"}  Activity Restrictions: {post op activity:09079::"No restrictions"}  {anything done today?:77440}    Ortho Exam      I have personally reviewed pertinent films in PACS and my interpretation is as follows        Scribe Attestation    I,:    am acting as a scribe while in the presence of the attending physician :        I,:    personally performed the services described in this documentation    as scribed in my presence :

## 2019-07-24 ENCOUNTER — TRANSCRIBE ORDERS (OUTPATIENT)
Dept: ADMINISTRATIVE | Facility: HOSPITAL | Age: 59
End: 2019-07-24

## 2019-07-24 DIAGNOSIS — M47.816 LUMBAR SPONDYLOSIS: Primary | ICD-10-CM

## 2019-08-01 ENCOUNTER — HOSPITAL ENCOUNTER (OUTPATIENT)
Dept: MRI IMAGING | Facility: HOSPITAL | Age: 59
Discharge: HOME | End: 2019-08-01
Payer: COMMERCIAL

## 2019-08-01 DIAGNOSIS — M47.816 LUMBAR SPONDYLOSIS: ICD-10-CM

## 2019-08-01 PROCEDURE — 72148 MRI LUMBAR SPINE W/O DYE: CPT

## 2019-08-14 ENCOUNTER — OFFICE VISIT (OUTPATIENT)
Dept: PODIATRY | Facility: CLINIC | Age: 59
End: 2019-08-14
Payer: COMMERCIAL

## 2019-08-14 VITALS
BODY MASS INDEX: 35.26 KG/M2 | SYSTOLIC BLOOD PRESSURE: 132 MMHG | RESPIRATION RATE: 16 BRPM | HEART RATE: 79 BPM | DIASTOLIC BLOOD PRESSURE: 92 MMHG | WEIGHT: 199 LBS | HEIGHT: 63 IN

## 2019-08-14 DIAGNOSIS — M54.16 RADICULOPATHY OF LUMBAR REGION: ICD-10-CM

## 2019-08-14 DIAGNOSIS — Q66.52 CONGENITAL PES PLANUS OF LEFT FOOT: ICD-10-CM

## 2019-08-14 DIAGNOSIS — Q66.51 CONGENITAL PES PLANUS OF RIGHT FOOT: ICD-10-CM

## 2019-08-14 DIAGNOSIS — M76.61 ACHILLES TENDINITIS OF RIGHT LOWER EXTREMITY: Primary | ICD-10-CM

## 2019-08-14 DIAGNOSIS — I83.811 VARICOSE VEINS OF RIGHT LOWER EXTREMITY WITH PAIN: ICD-10-CM

## 2019-08-14 PROCEDURE — 99213 OFFICE O/P EST LOW 20 MIN: CPT | Performed by: PODIATRIST

## 2019-08-14 PROCEDURE — 20551 NJX 1 TENDON ORIGIN/INSJ: CPT | Performed by: PODIATRIST

## 2019-08-14 NOTE — PROGRESS NOTES
Assessment/Plan:  Radiculopathy  Chronic edema  Achilles tendinitis  Everardo's deformity right  Plan  Right heel trigger point injection done  1 cc dexamethasone phosphate injected without pain or complication  Patient is being referred back to neurology and chiropractic for radiculopathy workup  She is urged to take Cymbalta as directed  She has been referred to vascular surgery  No problem-specific Assessment & Plan notes found for this encounter  There are no diagnoses linked to this encounter  Subjective:  Patient is status post knee surgery  She continues to have knee pain and swelling  She is not attending therapy  Patient did not take Cymbalta  Patient has continued back pain  She is undergoing workup for radiculopathy  She had MRI      Past Medical History:   Diagnosis Date    Breast lump     last assessed 8/24/10    GERD (gastroesophageal reflux disease)     Hyperlipidemia     Infectious viral hepatitis     Hepatitis B    Myalgia     last assessed  1/29/15    Myositis     last assessed  1/29/15    Pernicious anemia        Past Surgical History:   Procedure Laterality Date    CHOLECYSTECTOMY      COLONOSCOPY      EGD      [place] KNEE SCOPE,SINGLE MENISECTOMY Right 6/3/2019    Procedure: RIGHT KNEE ARTHROSCOPIC PARTIAL MEDIAL MENISCECTOMY; CHONDROPLASTY;  Surgeon: Alisa Juares MD;  [facility];  Service: Orthopedics    TUBAL LIGATION      URETHRAL SLING         No Known Allergies      Current Outpatient Medications:     acetaminophen (TYLENOL) 325 mg tablet, Take 650 mg by mouth every 6 (six) hours as needed for mild pain, Disp: , Rfl:     coenzyme Q-10 100 MG capsule, Take by mouth daily , Disp: , Rfl:     conjugated estrogens (PREMARIN) vaginal cream, Insert into the vagina as needed , Disp: , Rfl:     esomeprazole (NexIUM) 40 MG capsule, TAKE ONE CAPSULE BY MOUTH EVERY DAY (Patient taking differently: TAKE ONE CAPSULE BY MOUTH EVERY DAY in AM), Disp: 90 capsule, Rfl: 1    ibuprofen (MOTRIN) 600 mg tablet, Take 1 tablet (600 mg total) by mouth every 6 (six) hours as needed for moderate pain, Disp: 30 tablet, Rfl: 0    minocycline (MINOCIN) 50 mg capsule, Take 50 mg by mouth daily , Disp: , Rfl:     Omega-3 Fatty Acids (FISH OIL) 1,000 mg, Take 1,000 mg by mouth daily , Disp: , Rfl:     Red Yeast Rice 600 MG CAPS, Take 1 capsule (600 mg total) by mouth daily in the early morning, Disp: , Rfl: 0    Patient Active Problem List   Diagnosis    Neck pain    Esophageal reflux    Gastritis    Hernia, hiatal    Hyperlipidemia    Low back pain    Varicose veins of right lower extremity with pain    Urinary frequency    Urinary tract infection without hematuria    Achilles tendinitis of right lower extremity    Acquired deformity of foot    Pain in both feet    Congenital pes planus of right foot    Congenital pes planus of left foot    Radiculopathy of lumbar region    Encounter for gynecological examination (general) (routine) without abnormal findings    Rosacea    Class 2 obesity due to excess calories without serious comorbidity in adult    Primary osteoarthritis of right knee    Tear of medial meniscus of right knee    Peripheral tear of medial meniscus of right knee as current injury    Paresthesia of bilateral legs          Patient ID: Lloyd Guevara is a 61 y o  female  HPI    The following portions of the patient's history were reviewed and updated as appropriate:     family history includes Aneurysm in her sister; Atrial fibrillation in her mother; Breast cancer in her mother; Cancer in her maternal grandmother; Hypertension in her father and mother  reports that she has quit smoking  She has never used smokeless tobacco  She reports that she drinks alcohol  She reports that she does not use drugs      Vitals:    08/14/19 1634   BP: 132/92   Pulse: 79   Resp: 16       Review of Systems      Objective:  Patient's shoes and socks removed  Foot ExamPhysical Exam         General  General Appearance: appears stated age and healthy   Orientation: alert and oriented to person, place, and time   Affect: appropriate   Gait: antalgic         Right Foot/Ankle      Inspection and Palpation  Ecchymosis: none  Tenderness: bony tenderness   Swelling: metatarsals   Arch: pes planus  Hammertoes: fifth toe  Hallux valgus: no        Left Foot/Ankle       Inspection and Palpation  Ecchymosis: none  Tenderness: bony tenderness   Swelling: metatarsals   Arch: pes planus  Hammertoes: fifth toe  Hallux valgus: no  Hallux limitus: yes        Physical Exam   Constitutional: She appears well-developed and well-nourished  Cardiovascular: Normal rate and regular rhythm     Musculoskeletal:        Right foot: There is bony tenderness         Left foot: There is bony tenderness     Patient is pronated in stance and gait   Patient has retrocalcaneal exostosis of right foot    Neurological:   4/5 L5 testing         Procedures

## 2019-08-23 ENCOUNTER — OFFICE VISIT (OUTPATIENT)
Dept: OBGYN CLINIC | Facility: CLINIC | Age: 59
End: 2019-08-23
Payer: COMMERCIAL

## 2019-08-23 VITALS
DIASTOLIC BLOOD PRESSURE: 80 MMHG | BODY MASS INDEX: 35.26 KG/M2 | HEIGHT: 63 IN | HEART RATE: 72 BPM | WEIGHT: 199 LBS | SYSTOLIC BLOOD PRESSURE: 124 MMHG

## 2019-08-23 DIAGNOSIS — M17.11 PRIMARY OSTEOARTHRITIS OF RIGHT KNEE: Primary | ICD-10-CM

## 2019-08-23 DIAGNOSIS — S83.221A PERIPHERAL TEAR OF MEDIAL MENISCUS OF RIGHT KNEE AS CURRENT INJURY, INITIAL ENCOUNTER: ICD-10-CM

## 2019-08-23 PROCEDURE — 99024 POSTOP FOLLOW-UP VISIT: CPT | Performed by: ORTHOPAEDIC SURGERY

## 2019-08-23 PROCEDURE — 20610 DRAIN/INJ JOINT/BURSA W/O US: CPT | Performed by: ORTHOPAEDIC SURGERY

## 2019-08-23 RX ORDER — LIDOCAINE HYDROCHLORIDE 10 MG/ML
4 INJECTION, SOLUTION INFILTRATION; PERINEURAL
Status: COMPLETED | OUTPATIENT
Start: 2019-08-23 | End: 2019-08-23

## 2019-08-23 RX ORDER — METHYLPREDNISOLONE ACETATE 40 MG/ML
1 INJECTION, SUSPENSION INTRA-ARTICULAR; INTRALESIONAL; INTRAMUSCULAR; SOFT TISSUE
Status: COMPLETED | OUTPATIENT
Start: 2019-08-23 | End: 2019-08-23

## 2019-08-23 RX ADMIN — LIDOCAINE HYDROCHLORIDE 4 ML: 10 INJECTION, SOLUTION INFILTRATION; PERINEURAL at 07:24

## 2019-08-23 RX ADMIN — METHYLPREDNISOLONE ACETATE 1 ML: 40 INJECTION, SUSPENSION INTRA-ARTICULAR; INTRALESIONAL; INTRAMUSCULAR; SOFT TISSUE at 07:24

## 2019-08-23 NOTE — PROGRESS NOTES
Patient Name:  Renee Haji  MRN:  [id number]    Assessment & Plan     Right knee arthroscopic partial medial meniscectomy, chondroplasty 6/3/19, with persistent pain likely secondary to DJD  1  Steroid injection into the right knee  2  Referral to physical therapy  3  Follow-up as needed if pain persist       Subjective     60-year-old female returns to the office today for follow-up regarding her right knee  She does note persistent pain localized primarily to the medial aspect  She notes associated swelling in the knee which radiates distally into the foot  She denies numbness and tingling  She recently went on vacation to the beach and notes increased swelling after walking on the sand  She does note weakness  She denies instability  No fevers or chills  General ROS:  Negative for fever or chills  Neurological ROS:  Negative for numbness or tingling  Objective     /80   Pulse 72   Ht 5' 3" (1 6 m)   Wt 90 3 kg (199 lb)   LMP 01/01/2016   BMI 35 25 kg/m²     Right knee:  No gross deformity  Skin intact  No erythema ecchymosis or swelling  Small effusion  No medial or lateral joint line tenderness  Range of motion includes full extension and flexion to 120°  Mild crepitation noted in the patellofemoral compartment  Stable to varus and valgus stress  Stable Lachman test   Negative Bob's test   Positive patellar grind test   Sensation intact right lower extremity  Skin warm and well perfused      Large joint arthrocentesis: R knee  Date/Time: 8/23/2019 7:24 AM  Procedure Details  Location: knee - R knee  Needle size: 22 G  Ultrasound guidance: no  Approach: anterolateral  Medications administered: 4 mL lidocaine 1 %; 1 mL methylPREDNISolone acetate 40 mg/mL    Patient tolerance: patient tolerated the procedure well with no immediate complications  Dressing:  Sterile dressing applied            Social History     Tobacco Use    Smoking status: Former Smoker    Smokeless tobacco: Never Used    Tobacco comment: Quit at age 32   Substance Use Topics    Alcohol use: Yes     Frequency: Monthly or less     Drinks per session: 1 or 2     Binge frequency: Never     Comment: socially    Drug use: No       Scribe Attestation    I,:   Maye Davis PA-C am acting as a scribe while in the presence of the attending physician :        I,:   Thao Rosales MD personally performed the services described in this documentation    as scribed in my presence :

## 2019-09-06 ENCOUNTER — TRANSCRIBE ORDERS (OUTPATIENT)
Dept: ADMINISTRATIVE | Facility: HOSPITAL | Age: 59
End: 2019-09-06

## 2019-09-06 DIAGNOSIS — R13.10 DYSPHAGIA, UNSPECIFIED TYPE: Primary | ICD-10-CM

## 2019-09-10 ENCOUNTER — HOSPITAL ENCOUNTER (OUTPATIENT)
Dept: RADIOLOGY | Facility: HOSPITAL | Age: 59
Discharge: HOME | End: 2019-09-10
Attending: INTERNAL MEDICINE
Payer: COMMERCIAL

## 2019-09-10 DIAGNOSIS — R13.10 DYSPHAGIA, UNSPECIFIED TYPE: ICD-10-CM

## 2019-09-10 DIAGNOSIS — Z12.39 BREAST CANCER SCREENING: ICD-10-CM

## 2019-09-10 PROCEDURE — 74230 X-RAY XM SWLNG FUNCJ C+: CPT

## 2019-09-10 PROCEDURE — 92611 MOTION FLUOROSCOPY/SWALLOW: CPT

## 2019-09-10 PROCEDURE — G8996 SWALLOW CURRENT STATUS: HCPCS

## 2019-09-10 PROCEDURE — G8997 SWALLOW GOAL STATUS: HCPCS

## 2019-09-10 PROCEDURE — G8998 SWALLOW D/C STATUS: HCPCS

## 2019-09-10 NOTE — PROCEDURES
Video Swallow Study      Patient Name: Meena Bender  Today's Date: 9/10/2019        Past Medical History  Past Medical History:   Diagnosis Date    Breast lump     last assessed 8/24/10    GERD (gastroesophageal reflux disease)     Hyperlipidemia     Infectious viral hepatitis     Hepatitis B    Myalgia     last assessed  1/29/15    Myositis     last assessed  1/29/15    Pernicious anemia         Past Surgical History  Past Surgical History:   Procedure Laterality Date    CHOLECYSTECTOMY      COLONOSCOPY      EGD      NE KNEE SCOPE,SINGLE MENISECTOMY Right 6/3/2019    Procedure: RIGHT KNEE ARTHROSCOPIC PARTIAL MEDIAL MENISCECTOMY; CHONDROPLASTY;  Surgeon: Fariha España MD;  [facility];  Service: Orthopedics    TUBAL LIGATION      URETHRAL SLING           General Information:    60 yo female referred to [facility]  for a VBS by Dr Richard Velarde for dysphagia w/  C/o difficulty swallowing solids and liquids for several months  Pt reports frequent coughing and sneezing inconsistently with intake and delayed after intake  She has known reflux and takes Nexium daily which she does decrease symptoms in addition to a hiatal hernia  Last EGD was in 2016 and Dr/ Vamsi Bryan is considering another EGD per pt report  Cognition:  WFL    Speech/Swallow Mech: Oral motor movements appeared  adequate; Dentition was adequate; Respiratory Status: RA;   Current diet: regular  Prior VBS N/A    Pt was seen in radiology for a Video Barium Swallow Study, seated in the upright position and viewed laterally with the following consistencies: puree, soft solid, hard solid, HTL, NTL, thin and barium tablet whole with water by cup       Results are as follows:     **Images are available for review on PACS          Oral Stage:   Mastication, oral processing, control and transfer were all within functional limits  BOT retraction was minimally decreased  No posterior spill  Pharyngeal Stage:   Pt presents with a timely swallow initiation with adequate hyolaryngeal excursion, epiglottic inversion, airway entrance closure  No penetration or aspiration  The barium tablet did become stasis in the vallecula for a short period requiring additional liquid wash to clear  No other significant pharyngeal retention observed  Esophageal Stage:   Briefly assessed  Barium tablet passed through the esophagus w/o difficulty  Hiatal hernia was appreciated with some retention of liquids distally  Assessment Summary:   Pt presents with a functional oropharyngeal swallow  No penetration, aspiration, or significant pharyngeal retention was seen  The barium tablet did become stasis in the valleculae for a short period but easily cleared           Recommendations:   -Regular diet with thin liquids  -Reflux precautions   -F/u with GI per POC  -No further ST intervention warranted at this time         Milan MULLINS  [id number] [facility]  Speech-Language Pathologist  Available via [facility] #[id number]  [place] #[id number]

## 2019-09-22 DIAGNOSIS — K21.9 GERD WITHOUT ESOPHAGITIS: ICD-10-CM

## 2019-09-23 RX ORDER — ESOMEPRAZOLE MAGNESIUM 40 MG/1
CAPSULE, DELAYED RELEASE ORAL
Qty: 90 CAPSULE | Refills: 1 | Status: SHIPPED | OUTPATIENT
Start: 2019-09-23 | End: 2020-04-15

## 2020-01-29 ENCOUNTER — OFFICE VISIT (OUTPATIENT)
Dept: PODIATRY | Facility: CLINIC | Age: 60
End: 2020-01-29
Payer: COMMERCIAL

## 2020-01-29 VITALS
SYSTOLIC BLOOD PRESSURE: 134 MMHG | RESPIRATION RATE: 17 BRPM | HEIGHT: 63 IN | DIASTOLIC BLOOD PRESSURE: 80 MMHG | WEIGHT: 199 LBS | BODY MASS INDEX: 35.26 KG/M2 | HEART RATE: 78 BPM

## 2020-01-29 DIAGNOSIS — M76.61 ACHILLES TENDINITIS OF RIGHT LOWER EXTREMITY: Primary | ICD-10-CM

## 2020-01-29 DIAGNOSIS — S92.014A CLOSED NONDISPLACED FRACTURE OF BODY OF RIGHT CALCANEUS, INITIAL ENCOUNTER: ICD-10-CM

## 2020-01-29 DIAGNOSIS — M25.571 PAIN IN JOINTS OF BOTH FEET: ICD-10-CM

## 2020-01-29 DIAGNOSIS — M25.572 PAIN IN JOINTS OF BOTH FEET: ICD-10-CM

## 2020-01-29 DIAGNOSIS — M79.671 RIGHT FOOT PAIN: ICD-10-CM

## 2020-01-29 DIAGNOSIS — M72.2 PLANTAR FASCIITIS: ICD-10-CM

## 2020-01-29 DIAGNOSIS — G57.51 TARSAL TUNNEL SYNDROME, RIGHT: ICD-10-CM

## 2020-01-29 PROCEDURE — 99213 OFFICE O/P EST LOW 20 MIN: CPT | Performed by: PODIATRIST

## 2020-01-29 PROCEDURE — 20551 NJX 1 TENDON ORIGIN/INSJ: CPT | Performed by: PODIATRIST

## 2020-01-29 PROCEDURE — 73620 X-RAY EXAM OF FOOT: CPT | Performed by: PODIATRIST

## 2020-01-29 PROCEDURE — 29540 STRAPPING ANKLE &/FOOT: CPT | Performed by: PODIATRIST

## 2020-01-29 RX ORDER — MELOXICAM 7.5 MG/1
7.5 TABLET ORAL DAILY
Qty: 20 TABLET | Refills: 0 | Status: SHIPPED | OUTPATIENT
Start: 2020-01-29 | End: 2020-04-27

## 2020-01-29 NOTE — PROGRESS NOTES
Assessment/Plan:  Achilles tendinitis with retrocalcaneal spur  Osteoarthritis  Radiculopathy  Rule out calcaneal stress fracture  Rule out tarsal tunnel syndrome  Plantar fasciitis  Plan  Foot exam performed  X-rays taken  Right trigger point injection done  Into the area the Achilles tendon insertion 1 cc Kenalog 10 injected without pain or complication  We will add Mobic  Patient has been referred to Rheumatology  MRI ordered to rule out tarsal tunnel syndrome or stress fracture         Diagnoses and all orders for this visit:    Achilles tendinitis of right lower extremity  -     meloxicam (MOBIC) 7 5 mg tablet; Take 1 tablet (7 5 mg total) by mouth daily for 20 days    Plantar fasciitis  -     meloxicam (MOBIC) 7 5 mg tablet; Take 1 tablet (7 5 mg total) by mouth daily for 20 days    Tarsal tunnel syndrome, right  -     MRI ankle/heel right  wo contrast; Future    Closed nondisplaced fracture of body of right calcaneus, initial encounter  -     MRI ankle/heel right  wo contrast; Future    Pain in joints of both feet  -     Ambulatory referral to Rheumatology; Future          Subjective:  Patient has pain  She has pain in her right foot and ankle  This has been ongoing for months  All the joints in her body hurt  She does not have Lyme disease      Past Medical History:   Diagnosis Date    Breast lump     last assessed 8/24/10    GERD (gastroesophageal reflux disease)     Hyperlipidemia     Infectious viral hepatitis     Hepatitis B    Myalgia     last assessed  1/29/15    Myositis     last assessed  1/29/15    Pernicious anemia        Past Surgical History:   Procedure Laterality Date    CHOLECYSTECTOMY      COLONOSCOPY      EGD      [place] KNEE SCOPE,SINGLE MENISECTOMY Right 6/3/2019    Procedure: RIGHT KNEE ARTHROSCOPIC PARTIAL MEDIAL MENISCECTOMY; CHONDROPLASTY;  Surgeon: Chaparro Wood MD;  [facility];  Service: Orthopedics    TUBAL LIGATION      URETHRAL SLING         No Known Allergies      Current Outpatient Medications:     acetaminophen (TYLENOL) 325 mg tablet, Take 650 mg by mouth every 6 (six) hours as needed for mild pain, Disp: , Rfl:     coenzyme Q-10 100 MG capsule, Take by mouth daily , Disp: , Rfl:     conjugated estrogens (PREMARIN) vaginal cream, Insert into the vagina as needed , Disp: , Rfl:     esomeprazole (NexIUM) 40 MG capsule, TAKE ONE CAPSULE BY MOUTH EVERY DAY, Disp: 90 capsule, Rfl: 1    ibuprofen (MOTRIN) 600 mg tablet, Take 1 tablet (600 mg total) by mouth every 6 (six) hours as needed for moderate pain, Disp: 30 tablet, Rfl: 0    meloxicam (MOBIC) 7 5 mg tablet, Take 1 tablet (7 5 mg total) by mouth daily for 20 days, Disp: 20 tablet, Rfl: 0    Omega-3 Fatty Acids (FISH OIL) 1,000 mg, Take 1,000 mg by mouth daily , Disp: , Rfl:     Red Yeast Rice 600 MG CAPS, Take 1 capsule (600 mg total) by mouth daily in the early morning, Disp: , Rfl: 0    Patient Active Problem List   Diagnosis    Neck pain    Esophageal reflux    Gastritis    Hernia, hiatal    Hyperlipidemia    Low back pain    Varicose veins of right lower extremity with pain    Urinary frequency    Urinary tract infection without hematuria    Achilles tendinitis of right lower extremity    Acquired deformity of foot    Pain in both feet    Congenital pes planus of right foot    Congenital pes planus of left foot    Radiculopathy of lumbar region    Encounter for gynecological examination (general) (routine) without abnormal findings    Rosacea    Class 2 obesity due to excess calories without serious comorbidity in adult    Primary osteoarthritis of right knee    Tear of medial meniscus of right knee    Peripheral tear of medial meniscus of right knee as current injury    Paresthesia of bilateral legs          Patient ID: Todd Lynn is a 61 y o  female      HPI    The following portions of the patient's history were reviewed and updated as appropriate:     family history includes Aneurysm in her sister; Atrial fibrillation in her mother; Breast cancer in her mother; Cancer in her maternal grandmother; Hypertension in her father and mother  reports that she has quit smoking  She has never used smokeless tobacco  She reports that she drinks alcohol  She reports that she does not use drugs  Vitals:    01/29/20 1116   BP: 134/80   Pulse: 78   Resp: 17       Review of Systems      Objective:  Patient's shoes and socks removed  Foot Exam    General  General Appearance: appears stated age and healthy   Orientation: alert and oriented to person, place, and time   Affect: appropriate   Gait: antalgic       Right Foot/Ankle     Inspection and Palpation  Tenderness: calcaneus tenderness, bony tenderness and plantar fascia   Swelling: dorsum and metatarsals   Arch: pes planus  Hammertoes: fifth toe  Hallux valgus: yes  Hallux limitus: yes  Skin Exam: dry skin;     Neurovascular  Dorsalis pedis: 3+  Posterior tibial: 3+  Saphenous nerve sensation: normal  Tibial nerve sensation: normal  Superficial peroneal nerve sensation: normal  Deep peroneal nerve sensation: normal  Sural nerve sensation: normal      Left Foot/Ankle      Inspection and Palpation  Tenderness: bony tenderness, plantar fascia and calcaneus tenderness   Swelling: dorsum and metatarsals   Arch: pes planus  Hammertoes: fifth toe  Hallux valgus: yes  Hallux limitus: yes  Skin Exam: dry skin;     Neurovascular  Dorsalis pedis: 3+  Posterior tibial: 3+  Saphenous nerve sensation: normal  Tibial nerve sensation: normal  Superficial peroneal nerve sensation: normal  Deep peroneal nerve sensation: normal  Sural nerve sensation: normal        Physical Exam   Constitutional: She is oriented to person, place, and time  She appears well-developed and well-nourished  Cardiovascular: Normal rate and regular rhythm  Pulses:       Dorsalis pedis pulses are 3+ on the right side, and 3+ on the left side          Posterior tibial pulses are 3+ on the right side, and 3+ on the left side  Musculoskeletal: She exhibits tenderness and deformity  Right foot: There is bony tenderness  Left foot: There is bony tenderness  Patient is pronated in stance and gait  There is pain with palpation plantar fascia insertion of the right foot  Retrocalcaneal exostosis right foot  Equines deformity right foot  X-ray  Significant spurring noted on the plantar and posterior aspect of the calcaneus  Feet:   Right Foot:   Skin Integrity: Positive for dry skin  Left Foot:   Skin Integrity: Positive for dry skin  Neurological: She is alert and oriented to person, place, and time  Skin: Skin is warm and dry  Capillary refill takes less than 2 seconds  Psychiatric: She has a normal mood and affect  Her behavior is normal  Judgment and thought content normal    Vitals reviewed          Procedures

## 2020-02-09 ENCOUNTER — HOSPITAL ENCOUNTER (OUTPATIENT)
Dept: RADIOLOGY | Facility: HOSPITAL | Age: 60
Discharge: HOME | End: 2020-02-09
Payer: COMMERCIAL

## 2020-02-09 DIAGNOSIS — S92.014A CLOSED NONDISPLACED FRACTURE OF BODY OF RIGHT CALCANEUS, INITIAL ENCOUNTER: ICD-10-CM

## 2020-02-09 DIAGNOSIS — G57.51 TARSAL TUNNEL SYNDROME, RIGHT: ICD-10-CM

## 2020-02-09 PROCEDURE — 73721 MRI JNT OF LWR EXTRE W/O DYE: CPT

## 2020-02-11 ENCOUNTER — TELEPHONE (OUTPATIENT)
Dept: PODIATRY | Facility: CLINIC | Age: 60
End: 2020-02-11

## 2020-02-17 ENCOUNTER — OFFICE VISIT (OUTPATIENT)
Dept: PODIATRY | Facility: CLINIC | Age: 60
End: 2020-02-17
Payer: COMMERCIAL

## 2020-02-17 VITALS
BODY MASS INDEX: 35.26 KG/M2 | RESPIRATION RATE: 16 BRPM | HEIGHT: 63 IN | SYSTOLIC BLOOD PRESSURE: 131 MMHG | DIASTOLIC BLOOD PRESSURE: 87 MMHG | WEIGHT: 199 LBS | HEART RATE: 67 BPM

## 2020-02-17 DIAGNOSIS — S86.012A PARTIAL ACHILLES TENDON TEAR, LEFT, INITIAL ENCOUNTER: Primary | ICD-10-CM

## 2020-02-17 DIAGNOSIS — M79.671 RIGHT FOOT PAIN: ICD-10-CM

## 2020-02-17 PROCEDURE — 20551 NJX 1 TENDON ORIGIN/INSJ: CPT | Performed by: PODIATRIST

## 2020-02-17 PROCEDURE — L4361 PNEUMA/VAC WALK BOOT PRE OTS: HCPCS | Performed by: PODIATRIST

## 2020-02-17 PROCEDURE — 99212 OFFICE O/P EST SF 10 MIN: CPT | Performed by: PODIATRIST

## 2020-02-17 RX ORDER — MELOXICAM 7.5 MG/1
7.5 TABLET ORAL DAILY
Qty: 20 TABLET | Refills: 0 | Status: SHIPPED | OUTPATIENT
Start: 2020-02-17 | End: 2020-04-27

## 2020-02-17 NOTE — PROGRESS NOTES
Assessment/Plan:  Partial tell right Achilles tendon insertion  Retrocalcaneal exostosis  Pain upon ambulation  Plan  Foot exam performed  MRI reviewed with patient  Today, trigger point injection done  1 cc of dexamethasone phosphate injected to right Achilles tendon insertion without pain or complication  Patient will be immobilized with Aircast   We will continue with Voltaren gel as well as Mobic  Patient is trying this in an effort to avoid surgical intervention  She is aware this may be an option  Diagnoses and all orders for this visit:    Partial Achilles tendon tear, left, initial encounter  -     meloxicam (MOBIC) 7 5 mg tablet; Take 1 tablet (7 5 mg total) by mouth daily for 20 days  -     diclofenac sodium (VOLTAREN) 1 %; Apply 2 g topically 4 (four) times a day    Right foot pain  -     meloxicam (MOBIC) 7 5 mg tablet; Take 1 tablet (7 5 mg total) by mouth daily for 20 days  -     diclofenac sodium (VOLTAREN) 1 %; Apply 2 g topically 4 (four) times a day          Subjective:  Patient has pain in her right Achilles  She is aware of MRI results  She would like to try cast immobilization prior to surgery      No Known Allergies      Current Outpatient Medications:     acetaminophen (TYLENOL) 325 mg tablet, Take 650 mg by mouth every 6 (six) hours as needed for mild pain, Disp: , Rfl:     coenzyme Q-10 100 MG capsule, Take by mouth daily , Disp: , Rfl:     esomeprazole (NexIUM) 40 MG capsule, TAKE ONE CAPSULE BY MOUTH EVERY DAY, Disp: 90 capsule, Rfl: 1    ibuprofen (MOTRIN) 600 mg tablet, Take 1 tablet (600 mg total) by mouth every 6 (six) hours as needed for moderate pain, Disp: 30 tablet, Rfl: 0    Omega-3 Fatty Acids (FISH OIL) 1,000 mg, Take 1,000 mg by mouth daily , Disp: , Rfl:     Red Yeast Rice 600 MG CAPS, Take 1 capsule (600 mg total) by mouth daily in the early morning, Disp: , Rfl: 0    conjugated estrogens (PREMARIN) vaginal cream, Insert into the vagina as needed , Disp: , Rfl:     diclofenac sodium (VOLTAREN) 1 %, Apply 2 g topically 4 (four) times a day, Disp: 240 g, Rfl: 0    meloxicam (MOBIC) 7 5 mg tablet, Take 1 tablet (7 5 mg total) by mouth daily for 20 days (Patient not taking: Reported on 2/17/2020), Disp: 20 tablet, Rfl: 0    meloxicam (MOBIC) 7 5 mg tablet, Take 1 tablet (7 5 mg total) by mouth daily for 20 days, Disp: 20 tablet, Rfl: 0    Patient Active Problem List   Diagnosis    Neck pain    Esophageal reflux    Gastritis    Hernia, hiatal    Hyperlipidemia    Low back pain    Varicose veins of right lower extremity with pain    Urinary frequency    Urinary tract infection without hematuria    Achilles tendinitis of right lower extremity    Acquired deformity of foot    Pain in both feet    Congenital pes planus of right foot    Congenital pes planus of left foot    Radiculopathy of lumbar region    Encounter for gynecological examination (general) (routine) without abnormal findings    Rosacea    Class 2 obesity due to excess calories without serious comorbidity in adult    Primary osteoarthritis of right knee    Tear of medial meniscus of right knee    Peripheral tear of medial meniscus of right knee as current injury    Paresthesia of bilateral legs          Patient ID: Jalil Florentino is a 61 y o  female  HPI    The following portions of the patient's history were reviewed and updated as appropriate:     family history includes Aneurysm in her sister; Atrial fibrillation in her mother; Breast cancer in her mother; Cancer in her maternal grandmother; Hypertension in her father and mother  reports that she has quit smoking  She has never used smokeless tobacco  She reports that she drinks alcohol  She reports that she does not use drugs  Vitals:    02/17/20 1139   BP: 131/87   Pulse: 67   Resp: 16       Review of Systems      Objective:  Patient's shoes and socks removed     Foot ExamPhysical Exam      Patient's shoes and socks removed  Foot Exam     General  General Appearance: appears stated age and healthy   Orientation: alert and oriented to person, place, and time   Affect: appropriate   Gait: antalgic         Right Foot/Ankle      Inspection and Palpation  Tenderness: calcaneus tenderness, bony tenderness and plantar fascia   Swelling: dorsum and metatarsals   Arch: pes planus  Hammertoes: fifth toe  Hallux valgus: yes  Hallux limitus: yes  Skin Exam: dry skin;      Neurovascular  Dorsalis pedis: 3+  Posterior tibial: 3+  Saphenous nerve sensation: normal  Tibial nerve sensation: normal  Superficial peroneal nerve sensation: normal  Deep peroneal nerve sensation: normal  Sural nerve sensation: normal        Left Foot/Ankle       Inspection and Palpation  Tenderness: bony tenderness, plantar fascia and calcaneus tenderness   Swelling: dorsum and metatarsals   Arch: pes planus  Hammertoes: fifth toe  Hallux valgus: yes  Hallux limitus: yes  Skin Exam: dry skin;      Neurovascular  Dorsalis pedis: 3+  Posterior tibial: 3+  Saphenous nerve sensation: normal  Tibial nerve sensation: normal  Superficial peroneal nerve sensation: normal  Deep peroneal nerve sensation: normal  Sural nerve sensation: normal           Physical Exam   Constitutional: She is oriented to person, place, and time  She appears well-developed and well-nourished  Cardiovascular: Normal rate and regular rhythm  Pulses:       Dorsalis pedis pulses are 3+ on the right side, and 3+ on the left side  Posterior tibial pulses are 3+ on the right side, and 3+ on the left side  Musculoskeletal: She exhibits tenderness and deformity  Right foot: There is bony tenderness  Left foot: There is bony tenderness  Patient is pronated in stance and gait  There is pain with palpation plantar fascia insertion of the right foot  Retrocalcaneal exostosis right foot  Equines deformity right foot  X-ray    Significant spurring noted on the plantar and posterior aspect of the calcaneus  MRI report demonstrates partial insertional tear of right Achilles tendon  Exostosis noted as well  Feet:   Right Foot:   Skin Integrity: Positive for dry skin  Left Foot:   Skin Integrity: Positive for dry skin  Neurological: She is alert and oriented to person, place, and time  Skin: Skin is warm and dry  Capillary refill takes less than 2 seconds  Psychiatric: She has a normal mood and affect   Her behavior is normal  Judgment and thought content normal

## 2020-02-27 ENCOUNTER — TELEPHONE (OUTPATIENT)
Dept: FAMILY MEDICINE CLINIC | Facility: CLINIC | Age: 60
End: 2020-02-27

## 2020-02-27 NOTE — TELEPHONE ENCOUNTER
Wanted to know if dr Breezy Coffey knew of a good rheumatologist  She was seeing a podiatrist that recommended she see one but wasn't sure of who to see  Call pt with info

## 2020-02-28 ENCOUNTER — OFFICE VISIT (OUTPATIENT)
Dept: PODIATRY | Facility: CLINIC | Age: 60
End: 2020-02-28
Payer: COMMERCIAL

## 2020-02-28 VITALS
DIASTOLIC BLOOD PRESSURE: 80 MMHG | BODY MASS INDEX: 35.26 KG/M2 | HEART RATE: 83 BPM | SYSTOLIC BLOOD PRESSURE: 130 MMHG | WEIGHT: 199 LBS | RESPIRATION RATE: 17 BRPM | HEIGHT: 63 IN

## 2020-02-28 DIAGNOSIS — M76.61 ACHILLES TENDINITIS OF RIGHT LOWER EXTREMITY: ICD-10-CM

## 2020-02-28 DIAGNOSIS — M79.671 RIGHT FOOT PAIN: ICD-10-CM

## 2020-02-28 DIAGNOSIS — E78.2 MIXED HYPERLIPIDEMIA: Primary | ICD-10-CM

## 2020-02-28 DIAGNOSIS — Z00.00 PHYSICAL EXAM, ANNUAL: ICD-10-CM

## 2020-02-28 DIAGNOSIS — S86.012A PARTIAL ACHILLES TENDON TEAR, LEFT, INITIAL ENCOUNTER: Primary | ICD-10-CM

## 2020-02-28 PROCEDURE — 73620 X-RAY EXAM OF FOOT: CPT | Performed by: PODIATRIST

## 2020-02-28 PROCEDURE — 99213 OFFICE O/P EST LOW 20 MIN: CPT | Performed by: PODIATRIST

## 2020-02-28 NOTE — TELEPHONE ENCOUNTER
Patient is overdue for annual physical examination  Her last set of labs actually   We have not had a cholesterol since 2018  I do not see any notation from podiatrist in regards to rheumatologist   We did perform rheumatologic workup on her in 2018 which was negative    I would like to speak with her 1st before sign year over to rheumatologist because I am not entirely certain why I am sending her to rheumatologist

## 2020-02-28 NOTE — PROGRESS NOTES
Assessment/Plan:  Partial tear right Achilles tendon  Retrocalcaneal exostosis right calcaneus  Achilles tendinitis right foot  Pain upon ambulation  Plan  X-rays taken  Patient declines injection therapy  She will continue using Aircast as directed  She will try physical therapy  If this does not work she will consider surgical intervention         Diagnoses and all orders for this visit:    Partial Achilles tendon tear, left, initial encounter  -     Ambulatory referral to Physical Therapy; Future    Achilles tendinitis of right lower extremity  -     Ambulatory referral to Physical Therapy; Future    Right foot pain  -     Ambulatory referral to Physical Therapy; Future          Subjective:  Patient has continued pain    She is in cast   She knows MRI results    No Known Allergies      Current Outpatient Medications:     acetaminophen (TYLENOL) 325 mg tablet, Take 650 mg by mouth every 6 (six) hours as needed for mild pain, Disp: , Rfl:     coenzyme Q-10 100 MG capsule, Take by mouth daily , Disp: , Rfl:     conjugated estrogens (PREMARIN) vaginal cream, Insert into the vagina as needed , Disp: , Rfl:     diclofenac sodium (VOLTAREN) 1 %, Apply 2 g topically 4 (four) times a day, Disp: 240 g, Rfl: 0    esomeprazole (NexIUM) 40 MG capsule, TAKE ONE CAPSULE BY MOUTH EVERY DAY, Disp: 90 capsule, Rfl: 1    ibuprofen (MOTRIN) 600 mg tablet, Take 1 tablet (600 mg total) by mouth every 6 (six) hours as needed for moderate pain, Disp: 30 tablet, Rfl: 0    meloxicam (MOBIC) 7 5 mg tablet, Take 1 tablet (7 5 mg total) by mouth daily for 20 days (Patient not taking: Reported on 2/17/2020), Disp: 20 tablet, Rfl: 0    meloxicam (MOBIC) 7 5 mg tablet, Take 1 tablet (7 5 mg total) by mouth daily for 20 days, Disp: 20 tablet, Rfl: 0    Omega-3 Fatty Acids (FISH OIL) 1,000 mg, Take 1,000 mg by mouth daily , Disp: , Rfl:     Red Yeast Rice 600 MG CAPS, Take 1 capsule (600 mg total) by mouth daily in the early morning, Disp: , Rfl: 0    Patient Active Problem List   Diagnosis    Neck pain    Esophageal reflux    Gastritis    Hernia, hiatal    Hyperlipidemia    Low back pain    Varicose veins of right lower extremity with pain    Urinary frequency    Urinary tract infection without hematuria    Achilles tendinitis of right lower extremity    Acquired deformity of foot    Pain in both feet    Congenital pes planus of right foot    Congenital pes planus of left foot    Radiculopathy of lumbar region    Encounter for gynecological examination (general) (routine) without abnormal findings    Rosacea    Class 2 obesity due to excess calories without serious comorbidity in adult    Primary osteoarthritis of right knee    Tear of medial meniscus of right knee    Peripheral tear of medial meniscus of right knee as current injury    Paresthesia of bilateral legs          Patient ID: Emily Morris is a 61 y o  female  HPI    The following portions of the patient's history were reviewed and updated as appropriate:     family history includes Aneurysm in her sister; Atrial fibrillation in her mother; Breast cancer in her mother; Cancer in her maternal grandmother; Hypertension in her father and mother  reports that she has quit smoking  She has never used smokeless tobacco  She reports that she drinks alcohol  She reports that she does not use drugs  Vitals:    02/28/20 1300   BP: 130/80   Pulse: 83   Resp: 17       Review of Systems      Objective:  Patient's shoes and socks removed     Foot ExamPhysical Exam       Foot Exam     General  General Appearance: appears stated age and healthy   Orientation: alert and oriented to person, place, and time   Affect: appropriate   Gait: antalgic         Right Foot/Ankle      Inspection and Palpation  Tenderness: calcaneus tenderness, bony tenderness and plantar fascia   Swelling: dorsum and metatarsals   Arch: pes planus  Hammertoes: fifth toe  Hallux valgus: yes  Hallux limitus: yes  Skin Exam: dry skin;      Neurovascular  Dorsalis pedis: 3+  Posterior tibial: 3+  Saphenous nerve sensation: normal  Tibial nerve sensation: normal  Superficial peroneal nerve sensation: normal  Deep peroneal nerve sensation: normal  Sural nerve sensation: normal        Left Foot/Ankle       Inspection and Palpation  Tenderness: bony tenderness, plantar fascia and calcaneus tenderness   Swelling: dorsum and metatarsals   Arch: pes planus  Hammertoes: fifth toe  Hallux valgus: yes  Hallux limitus: yes  Skin Exam: dry skin;      Neurovascular  Dorsalis pedis: 3+  Posterior tibial: 3+  Saphenous nerve sensation: normal  Tibial nerve sensation: normal  Superficial peroneal nerve sensation: normal  Deep peroneal nerve sensation: normal  Sural nerve sensation: normal           Physical Exam   Constitutional: She is oriented to person, place, and time  She appears well-developed and well-nourished  Cardiovascular: Normal rate and regular rhythm  Pulses:       Dorsalis pedis pulses are 3+ on the right side, and 3+ on the left side         Posterior tibial pulses are 3+ on the right side, and 3+ on the left side  Musculoskeletal: She exhibits tenderness and deformity       Right foot: There is bony tenderness         Left foot: There is bony tenderness    Patient is pronated in stance and gait   There is pain with palpation plantar fascia insertion of the right foot   Retrocalcaneal exostosis right foot   Equines deformity right foot  X-ray   Significant spurring noted on the plantar and posterior aspect of the calcaneus       MRI report demonstrates partial insertional tear of right Achilles tendon  Exostosis noted as well

## 2020-04-15 DIAGNOSIS — K21.9 GERD WITHOUT ESOPHAGITIS: ICD-10-CM

## 2020-04-15 RX ORDER — ESOMEPRAZOLE MAGNESIUM 40 MG/1
CAPSULE, DELAYED RELEASE ORAL
Qty: 90 CAPSULE | Refills: 1 | Status: SHIPPED | OUTPATIENT
Start: 2020-04-15 | End: 2020-04-27 | Stop reason: SDUPTHER

## 2020-04-24 RX ORDER — ONDANSETRON 4 MG/1
TABLET, FILM COATED ORAL
COMMUNITY
End: 2020-04-27

## 2020-04-24 RX ORDER — DOXYCYCLINE 40 MG/1
CAPSULE ORAL
COMMUNITY
Start: 2020-03-24 | End: 2021-04-19 | Stop reason: ALTCHOICE

## 2020-04-24 RX ORDER — TOLTERODINE TARTRATE 2 MG/1
TABLET, EXTENDED RELEASE ORAL
COMMUNITY
End: 2020-04-27

## 2020-04-24 RX ORDER — GABAPENTIN 100 MG/1
CAPSULE ORAL DAILY
COMMUNITY
End: 2020-04-27

## 2020-04-27 ENCOUNTER — TELEMEDICINE (OUTPATIENT)
Dept: FAMILY MEDICINE CLINIC | Facility: CLINIC | Age: 60
End: 2020-04-27
Payer: COMMERCIAL

## 2020-04-27 DIAGNOSIS — E78.2 MIXED HYPERLIPIDEMIA: ICD-10-CM

## 2020-04-27 DIAGNOSIS — K21.00 GASTROESOPHAGEAL REFLUX DISEASE WITH ESOPHAGITIS: Primary | ICD-10-CM

## 2020-04-27 DIAGNOSIS — K21.9 GERD WITHOUT ESOPHAGITIS: ICD-10-CM

## 2020-04-27 PROCEDURE — 99214 OFFICE O/P EST MOD 30 MIN: CPT | Performed by: FAMILY MEDICINE

## 2020-04-27 RX ORDER — ESOMEPRAZOLE MAGNESIUM 40 MG/1
40 CAPSULE, DELAYED RELEASE ORAL DAILY
Qty: 90 CAPSULE | Refills: 1 | Status: SHIPPED | OUTPATIENT
Start: 2020-04-27 | End: 2021-06-22 | Stop reason: SDUPTHER

## 2020-04-28 PROBLEM — N39.0 URINARY TRACT INFECTION WITHOUT HEMATURIA: Status: RESOLVED | Noted: 2018-07-26 | Resolved: 2020-04-28

## 2020-06-29 ENCOUNTER — TELEMEDICINE (OUTPATIENT)
Dept: FAMILY MEDICINE CLINIC | Facility: CLINIC | Age: 60
End: 2020-06-29
Payer: COMMERCIAL

## 2020-06-29 VITALS — HEIGHT: 63 IN | WEIGHT: 190 LBS | BODY MASS INDEX: 33.66 KG/M2 | TEMPERATURE: 97.9 F

## 2020-06-29 DIAGNOSIS — Z20.828 EXPOSURE TO SARS-ASSOCIATED CORONAVIRUS: ICD-10-CM

## 2020-06-29 DIAGNOSIS — Z20.828 EXPOSURE TO SARS-ASSOCIATED CORONAVIRUS: Primary | ICD-10-CM

## 2020-06-29 PROCEDURE — U0003 INFECTIOUS AGENT DETECTION BY NUCLEIC ACID (DNA OR RNA); SEVERE ACUTE RESPIRATORY SYNDROME CORONAVIRUS 2 (SARS-COV-2) (CORONAVIRUS DISEASE [COVID-19]), AMPLIFIED PROBE TECHNIQUE, MAKING USE OF HIGH THROUGHPUT TECHNOLOGIES AS DESCRIBED BY CMS-2020-01-R: HCPCS

## 2020-06-29 PROCEDURE — 99214 OFFICE O/P EST MOD 30 MIN: CPT | Performed by: FAMILY MEDICINE

## 2020-06-30 ENCOUNTER — TELEMEDICINE (OUTPATIENT)
Dept: FAMILY MEDICINE CLINIC | Facility: CLINIC | Age: 60
End: 2020-06-30
Payer: COMMERCIAL

## 2020-06-30 DIAGNOSIS — Z20.822 EXPOSURE TO COVID-19 VIRUS: Primary | ICD-10-CM

## 2020-06-30 PROCEDURE — 99214 OFFICE O/P EST MOD 30 MIN: CPT | Performed by: FAMILY MEDICINE

## 2020-07-04 LAB — SARS-COV-2 RNA SPEC QL NAA+PROBE: NOT DETECTED

## 2020-07-05 ENCOUNTER — APPOINTMENT (EMERGENCY)
Dept: RADIOLOGY | Facility: HOSPITAL | Age: 60
End: 2020-07-05
Payer: COMMERCIAL

## 2020-07-05 ENCOUNTER — HOSPITAL ENCOUNTER (EMERGENCY)
Facility: HOSPITAL | Age: 60
Discharge: HOME | End: 2020-07-05
Attending: EMERGENCY MEDICINE | Admitting: EMERGENCY MEDICINE
Payer: COMMERCIAL

## 2020-07-05 VITALS
RESPIRATION RATE: 16 BRPM | OXYGEN SATURATION: 98 % | TEMPERATURE: 97.8 F | HEIGHT: 64 IN | HEART RATE: 65 BPM | BODY MASS INDEX: 32.44 KG/M2 | DIASTOLIC BLOOD PRESSURE: 83 MMHG | SYSTOLIC BLOOD PRESSURE: 125 MMHG | WEIGHT: 190 LBS

## 2020-07-05 DIAGNOSIS — T78.40XA ALLERGY: Primary | ICD-10-CM

## 2020-07-05 LAB
ALBUMIN SERPL BCP-MCNC: 3.8 G/DL (ref 3.5–5)
ALP SERPL-CCNC: 92 U/L (ref 46–116)
ALT SERPL W P-5'-P-CCNC: 42 U/L (ref 12–78)
ANION GAP SERPL CALCULATED.3IONS-SCNC: 11 MMOL/L (ref 4–13)
AST SERPL W P-5'-P-CCNC: 28 U/L (ref 5–45)
BASOPHILS # BLD AUTO: 0.03 THOUSANDS/ΜL (ref 0–0.1)
BASOPHILS NFR BLD AUTO: 1 % (ref 0–1)
BILIRUB SERPL-MCNC: 1.2 MG/DL (ref 0.2–1)
BUN SERPL-MCNC: 15 MG/DL (ref 5–25)
CALCIUM SERPL-MCNC: 9 MG/DL (ref 8.3–10.1)
CHLORIDE SERPL-SCNC: 106 MMOL/L (ref 100–108)
CO2 SERPL-SCNC: 24 MMOL/L (ref 21–32)
CREAT SERPL-MCNC: 0.79 MG/DL (ref 0.6–1.3)
EOSINOPHIL # BLD AUTO: 0.05 THOUSAND/ΜL (ref 0–0.61)
EOSINOPHIL NFR BLD AUTO: 1 % (ref 0–6)
ERYTHROCYTE [DISTWIDTH] IN BLOOD BY AUTOMATED COUNT: 12.5 % (ref 11.6–15.1)
GFR SERPL CREATININE-BSD FRML MDRD: 82 ML/MIN/1.73SQ M
GLUCOSE SERPL-MCNC: 101 MG/DL (ref 65–140)
HCT VFR BLD AUTO: 45.4 % (ref 34.8–46.1)
HGB BLD-MCNC: 15.5 G/DL (ref 11.5–15.4)
IMM GRANULOCYTES # BLD AUTO: 0.01 THOUSAND/UL (ref 0–0.2)
IMM GRANULOCYTES NFR BLD AUTO: 0 % (ref 0–2)
LYMPHOCYTES # BLD AUTO: 1.35 THOUSANDS/ΜL (ref 0.6–4.47)
LYMPHOCYTES NFR BLD AUTO: 26 % (ref 14–44)
MCH RBC QN AUTO: 30.1 PG (ref 26.8–34.3)
MCHC RBC AUTO-ENTMCNC: 34.1 G/DL (ref 31.4–37.4)
MCV RBC AUTO: 88 FL (ref 82–98)
MONOCYTES # BLD AUTO: 0.31 THOUSAND/ΜL (ref 0.17–1.22)
MONOCYTES NFR BLD AUTO: 6 % (ref 4–12)
NEUTROPHILS # BLD AUTO: 3.55 THOUSANDS/ΜL (ref 1.85–7.62)
NEUTS SEG NFR BLD AUTO: 66 % (ref 43–75)
NRBC BLD AUTO-RTO: 0 /100 WBCS
PLATELET # BLD AUTO: 197 THOUSANDS/UL (ref 149–390)
PMV BLD AUTO: 11.9 FL (ref 8.9–12.7)
POTASSIUM SERPL-SCNC: 3.9 MMOL/L (ref 3.5–5.3)
PROT SERPL-MCNC: 7.3 G/DL (ref 6.4–8.2)
RBC # BLD AUTO: 5.15 MILLION/UL (ref 3.81–5.12)
SODIUM SERPL-SCNC: 141 MMOL/L (ref 136–145)
TROPONIN I SERPL-MCNC: <0.02 NG/ML
TSH SERPL DL<=0.05 MIU/L-ACNC: 1.3 UIU/ML (ref 0.36–3.74)
WBC # BLD AUTO: 5.3 THOUSAND/UL (ref 4.31–10.16)

## 2020-07-05 PROCEDURE — 99284 EMERGENCY DEPT VISIT MOD MDM: CPT

## 2020-07-05 PROCEDURE — 85025 COMPLETE CBC W/AUTO DIFF WBC: CPT | Performed by: EMERGENCY MEDICINE

## 2020-07-05 PROCEDURE — 71045 X-RAY EXAM CHEST 1 VIEW: CPT

## 2020-07-05 PROCEDURE — 99285 EMERGENCY DEPT VISIT HI MDM: CPT | Performed by: EMERGENCY MEDICINE

## 2020-07-05 PROCEDURE — 84484 ASSAY OF TROPONIN QUANT: CPT | Performed by: EMERGENCY MEDICINE

## 2020-07-05 PROCEDURE — 36415 COLL VENOUS BLD VENIPUNCTURE: CPT | Performed by: EMERGENCY MEDICINE

## 2020-07-05 PROCEDURE — 84443 ASSAY THYROID STIM HORMONE: CPT | Performed by: EMERGENCY MEDICINE

## 2020-07-05 PROCEDURE — 80053 COMPREHEN METABOLIC PANEL: CPT | Performed by: EMERGENCY MEDICINE

## 2020-07-05 PROCEDURE — 96361 HYDRATE IV INFUSION ADD-ON: CPT

## 2020-07-05 PROCEDURE — 93005 ELECTROCARDIOGRAM TRACING: CPT

## 2020-07-05 PROCEDURE — 96360 HYDRATION IV INFUSION INIT: CPT

## 2020-07-05 RX ORDER — MELATONIN
1000 DAILY
COMMUNITY
End: 2020-08-19

## 2020-07-05 RX ADMIN — SODIUM CHLORIDE 1000 ML: 0.9 INJECTION, SOLUTION INTRAVENOUS at 09:24

## 2020-07-05 NOTE — ED PROVIDER NOTES
History  Chief Complaint   Patient presents with    Sore Throat     Patient states she has had a sore throat for past week and a half,was chescked recently for COVID and is negative,still has sore throat and has increased heart rate,patient checks it on her own oulse ox at home     Patient has been having discomfort in her throat and neck  She describes it as a sore throat however states that it feels better when swallowing water  She has had this for some time associated with discomfort in the neck even radiating up to the ears  She feels some congestion and occasional headaches with the symptoms  Patient thought that it might be allergies however she is not taking any medications  She tries to avoid taking medicines  Recently patient has been under increased stress and has noted that her heart rate changes throughout the day  She uses a pulse oximeter frequently and has noted rapid heart rates with normal oxygen levels yesterday and today  Patient is concerned possibility of a stroke or heart attack  She denies any pain          Prior to Admission Medications   Prescriptions Last Dose Informant Patient Reported? Taking?    Omega-3 Fatty Acids (FISH OIL) 1,000 mg   Yes Yes   Sig: Take 1,000 mg by mouth daily    Red Yeast Rice 600 MG CAPS   No Yes   Sig: Take 1 capsule (600 mg total) by mouth daily in the early morning   cholecalciferol (VITAMIN D3) 1,000 units tablet   Yes Yes   Sig: Take 1,000 Units by mouth daily   coenzyme Q-10 100 MG capsule   Yes Yes   Sig: Take by mouth daily    doxycycline (ORACEA) 40 MG capsule   Yes Yes   esomeprazole (NexIUM) 40 MG capsule   No Yes   Sig: Take 1 capsule (40 mg total) by mouth daily      Facility-Administered Medications: None       Past Medical History:   Diagnosis Date    Breast lump     last assessed 8/24/10    GERD (gastroesophageal reflux disease)     Hyperlipidemia     Infectious viral hepatitis     Hepatitis B    Myalgia     last assessed  1/29/15    Myositis     last assessed  1/29/15    Pernicious anemia        Past Surgical History:   Procedure Laterality Date    CHOLECYSTECTOMY      COLONOSCOPY      EGD      LA KNEE SCOPE,SINGLE MENISECTOMY Right 6/3/2019    Procedure: RIGHT KNEE ARTHROSCOPIC PARTIAL MEDIAL MENISCECTOMY; CHONDROPLASTY;  Surgeon: Kathie Mccabe MD;  Location[facility];  Service: Orthopedics    TUBAL LIGATION      URETHRAL SLING         Family History   Problem Relation Age of Onset    Atrial fibrillation Mother    Jeff Pert Breast cancer Mother     Hypertension Mother     Hypertension Father     Aneurysm Sister         abdominal aortic    Cancer Maternal Grandmother      I have reviewed and agree with the history as documented  E-Cigarette/Vaping     E-Cigarette/Vaping Substances     Social History     Tobacco Use    Smoking status: Former Smoker    Smokeless tobacco: Never Used    Tobacco comment: Quit at age 32   Substance Use Topics    Alcohol use: Yes     Frequency: Monthly or less     Drinks per session: 1 or 2     Binge frequency: Never     Comment: socially    Drug use: No       Review of Systems   Constitutional: Negative for chills and fever  HENT: Positive for congestion and sore throat  Negative for trouble swallowing  Eyes: Negative for visual disturbance  Respiratory: Positive for cough  Negative for shortness of breath, wheezing and stridor  Cardiovascular: Positive for palpitations  Negative for chest pain and leg swelling  Gastrointestinal: Negative for abdominal pain and vomiting  Genitourinary: Negative for dysuria  Musculoskeletal: Negative for back pain  Skin: Negative for rash  Neurological: Positive for headaches  Negative for weakness  Hematological: Does not bruise/bleed easily  Psychiatric/Behavioral: The patient is nervous/anxious  All other systems reviewed and are negative  Physical Exam  Physical Exam   Constitutional: She appears well-developed and well-nourished  HENT:   Head: Normocephalic and atraumatic  Right Ear: Tympanic membrane normal    Left Ear: Tympanic membrane normal    Mouth/Throat: Oropharynx is clear and moist and mucous membranes are normal    Eyes: Pupils are equal, round, and reactive to light  EOM are normal    Neck: Normal range of motion  Neck supple  Cardiovascular: Normal rate, regular rhythm and normal heart sounds  Pulmonary/Chest: Effort normal and breath sounds normal    Abdominal: Soft  Bowel sounds are normal    Neurological: She is alert  Skin: Skin is warm  Capillary refill takes less than 2 seconds  Psychiatric: She has a normal mood and affect  Her behavior is normal    Nursing note and vitals reviewed  Vital Signs  ED Triage Vitals   Temperature Pulse Respirations Blood Pressure SpO2   07/05/20 0840 07/05/20 0840 07/05/20 0840 07/05/20 0840 07/05/20 0840   97 8 °F (36 6 °C) 87 18 157/82 99 %      Temp Source Heart Rate Source Patient Position - Orthostatic VS BP Location FiO2 (%)   07/05/20 0840 07/05/20 0840 07/05/20 0840 07/05/20 0840 --   Tympanic Monitor Lying Right arm       Pain Score       07/05/20 1113       3           Vitals:    07/05/20 0840 07/05/20 0845 07/05/20 0915 07/05/20 1113   BP: 157/82 157/82 138/93 143/86   Pulse: 87   63   Patient Position - Orthostatic VS: Lying   Lying         Visual Acuity      ED Medications  Medications   sodium chloride 0 9 % bolus 1,000 mL (1,000 mL Intravenous New Bag 7/5/20 0924)       Diagnostic Studies  Results Reviewed     Procedure Component Value Units Date/Time    TSH [[id number]]  (Normal) Collected:  07/05/20 0924    Lab Status:  Final result Specimen:  Blood from Arm, Left Updated:  07/05/20 1008     TSH 3RD GENERATON 1 304 uIU/mL     Narrative:       Patients undergoing fluorescein dye angiography may retain small amounts of fluorescein in the body for 48-72 hours post procedure  Samples containing fluorescein can produce falsely depressed TSH values   If the patient had this procedure,a specimen should be resubmitted post fluorescein clearance        Troponin I [[id number]]  (Normal) Collected:  07/05/20 0924    Lab Status:  Final result Specimen:  Blood from Arm, Left Updated:  07/05/20 1004     Troponin I <0 02 ng/mL     Comprehensive metabolic panel [[id number]]  (Abnormal) Collected:  07/05/20 0924    Lab Status:  Final result Specimen:  Blood from Arm, Left Updated:  07/05/20 1000     Sodium 141 mmol/L      Potassium 3 9 mmol/L      Chloride 106 mmol/L      CO2 24 mmol/L      ANION GAP 11 mmol/L      BUN 15 mg/dL      Creatinine 0 79 mg/dL      Glucose 101 mg/dL      Calcium 9 0 mg/dL      AST 28 U/L      ALT 42 U/L      Alkaline Phosphatase 92 U/L      Total Protein 7 3 g/dL      Albumin 3 8 g/dL      Total Bilirubin 1 20 mg/dL      eGFR 82 ml/min/1 73sq m     Narrative:       [place] guidelines for Chronic Kidney Disease (CKD):     Stage 1 with normal or high GFR (GFR > 90 mL/min/1 73 square meters)    Stage 2 Mild CKD (GFR = 60-89 mL/min/1 73 square meters)    Stage 3A Moderate CKD (GFR = 45-59 mL/min/1 73 square meters)    Stage 3B Moderate CKD (GFR = 30-44 mL/min/1 73 square meters)    Stage 4 Severe CKD (GFR = 15-29 mL/min/1 73 square meters)    Stage 5 End Stage CKD (GFR <15 mL/min/1 73 square meters)  Note: GFR calculation is accurate only with a steady state creatinine    CBC and differential [[id number]]  (Abnormal) Collected:  07/05/20 0924    Lab Status:  Final result Specimen:  Blood from Arm, Left Updated:  07/05/20 0930     WBC 5 30 Thousand/uL      RBC 5 15 Million/uL      Hemoglobin 15 5 g/dL      Hematocrit 45 4 %      MCV 88 fL      MCH 30 1 pg      MCHC 34 1 g/dL      RDW 12 5 %      MPV 11 9 fL      Platelets 294 Thousands/uL      nRBC 0 /100 WBCs      Neutrophils Relative 66 %      Immat GRANS % 0 %      Lymphocytes Relative 26 %      Monocytes Relative 6 %      Eosinophils Relative 1 %      Basophils Relative 1 % Neutrophils Absolute 3 55 Thousands/µL      Immature Grans Absolute 0 01 Thousand/uL      Lymphocytes Absolute 1 35 Thousands/µL      Monocytes Absolute 0 31 Thousand/µL      Eosinophils Absolute 0 05 Thousand/µL      Basophils Absolute 0 03 Thousands/µL                  XR chest 1 view    (Results Pending)              Procedures  ECG 12 Lead Documentation Only  Date/Time: 7/5/2020 8:40 AM  Performed by: Joce Hale MD  Authorized by: Joce Hale MD     Indications / Diagnosis:  Palpitations  ECG reviewed by me, the ED Provider: yes    Patient location:  ED  Interpretation:     Interpretation: normal    Rate:     ECG rate:  87    ECG rate assessment: normal    Rhythm:     Rhythm: sinus rhythm    Ectopy:     Ectopy: none    QRS:     QRS axis:  Normal    QRS intervals:  Normal  Conduction:     Conduction: normal    ST segments:     ST segments:  Normal  T waves:     T waves: normal               ED Course       US AUDIT      Most Recent Value   Initial Alcohol Screen: US AUDIT-C    1  How often do you have a drink containing alcohol? 1 Filed at: 07/05/2020 0841   2  How many drinks containing alcohol do you have on a typical day you are drinking? 1 Filed at: 07/05/2020 0841   3a  Male UNDER 65: How often do you have five or more drinks on one occasion? 0 Filed at: 07/05/2020 0841   3b  FEMALE Any Age, or MALE 65+: How often do you have 4 or more drinks on one occassion? 0 Filed at: 07/05/2020 0841   Audit-C Score  2 Filed at: 07/05/2020 0841                  SYDNEE/DAST-10      Most Recent Value   How many times in the past year have you    Used an illegal drug or used a prescription medication for non-medical reasons? Never Filed at: 07/05/2020 0841                                MDM  Number of Diagnoses or Management Options  Allergy:   Diagnosis management comments: A tried reassuring patient however she would prefer rule out tests        Disposition  Final diagnoses:    Allergy     Time reflects when diagnosis was documented in both MDM as applicable and the Disposition within this note     Time User Action Codes Description Comment    7/5/2020 11:33 AM Seema, [facility] Allergy       ED Disposition     ED Disposition Condition Date/Time Comment    Discharge Stable Sun Jul 5, 2020 11:33 AM Inna Moore discharge to home/self care  Follow-up Information     Follow up With Specialties Details Why Contact Info    Jaya High DO Family Medicine Schedule an appointment as soon as possible for a visit   [address] [facility] [address]  [phone]            Patient's Medications   Discharge Prescriptions    No medications on file     No discharge procedures on file      PDMP Review     None          ED Provider  Electronically Signed by           Artie Alaniz MD  07/05/20 7338

## 2020-07-05 NOTE — ED NOTES
Patient transported to [facility], [address] [place] [address]  07/05/20 3430

## 2020-07-08 LAB
ATRIAL RATE: 87 BPM
P AXIS: 50 DEGREES
PR INTERVAL: 182 MS
QRS AXIS: -28 DEGREES
QRSD INTERVAL: 96 MS
QT INTERVAL: 380 MS
QTC INTERVAL: 457 MS
T WAVE AXIS: 34 DEGREES
VENTRICULAR RATE: 87 BPM

## 2020-07-08 PROCEDURE — 93010 ELECTROCARDIOGRAM REPORT: CPT | Performed by: INTERNAL MEDICINE

## 2020-07-10 ENCOUNTER — OFFICE VISIT (OUTPATIENT)
Dept: FAMILY MEDICINE CLINIC | Facility: CLINIC | Age: 60
End: 2020-07-10
Payer: COMMERCIAL

## 2020-07-10 VITALS
OXYGEN SATURATION: 97 % | RESPIRATION RATE: 16 BRPM | WEIGHT: 190 LBS | HEART RATE: 78 BPM | HEIGHT: 64 IN | TEMPERATURE: 97.2 F | BODY MASS INDEX: 32.44 KG/M2 | DIASTOLIC BLOOD PRESSURE: 74 MMHG | SYSTOLIC BLOOD PRESSURE: 128 MMHG

## 2020-07-10 DIAGNOSIS — F06.4 ANXIETY DISORDER DUE TO KNOWN PHYSIOLOGICAL CONDITION: ICD-10-CM

## 2020-07-10 DIAGNOSIS — R53.83 FATIGUE, UNSPECIFIED TYPE: Primary | ICD-10-CM

## 2020-07-10 DIAGNOSIS — K29.00 ACUTE SUPERFICIAL GASTRITIS WITHOUT HEMORRHAGE: ICD-10-CM

## 2020-07-10 DIAGNOSIS — R68.2 DRY MOUTH: ICD-10-CM

## 2020-07-10 DIAGNOSIS — E78.2 MIXED HYPERLIPIDEMIA: ICD-10-CM

## 2020-07-10 PROBLEM — M76.61 ACHILLES TENDINITIS OF RIGHT LOWER EXTREMITY: Status: RESOLVED | Noted: 2018-09-10 | Resolved: 2020-07-10

## 2020-07-10 PROCEDURE — 3078F DIAST BP <80 MM HG: CPT | Performed by: FAMILY MEDICINE

## 2020-07-10 PROCEDURE — 1036F TOBACCO NON-USER: CPT | Performed by: FAMILY MEDICINE

## 2020-07-10 PROCEDURE — 3074F SYST BP LT 130 MM HG: CPT | Performed by: FAMILY MEDICINE

## 2020-07-10 PROCEDURE — 3008F BODY MASS INDEX DOCD: CPT | Performed by: FAMILY MEDICINE

## 2020-07-10 PROCEDURE — 99215 OFFICE O/P EST HI 40 MIN: CPT | Performed by: FAMILY MEDICINE

## 2020-07-10 RX ORDER — ALPRAZOLAM 0.25 MG/1
0.25 TABLET ORAL 3 TIMES DAILY PRN
Qty: 30 TABLET | Refills: 0 | Status: SHIPPED | OUTPATIENT
Start: 2020-07-10 | End: 2021-04-19 | Stop reason: ALTCHOICE

## 2020-07-10 NOTE — ASSESSMENT & PLAN NOTE
Patient has been somewhat concerned about the health of her daughter who is having issues with swallowing as well as eating  This could be playing in to some of her symptoms  I did provide a prescription for p r n  Alprazolam at a very low dosage and in a limited supply  Patient is very reluctant about taking medication but if she really can not relaxer self she would consider taking 1    Prior to prescribing the controlled substance, a patient search was performed on the [place] prescription drug monitoring program web site  There was no evidence of diversion or misuse    Prescription provided

## 2020-07-10 NOTE — PROGRESS NOTES
Subjective:      Patient ID: Flavio Mott is a 61 y o  female  63-year-old female presents following ER evaluation at [facility] for complaint of generalized malaise, sore throat, palpitations, nausea  Patient was severe sleep concerned that she was having a cardiac event  Patient did have extensive testing performed through ER including EKG, cardiac enzymes, CBC, TSH, CMP all of which were really unremarkable  Patient states that she feels that her heart will race at times and it makes her feel paranoid that she is having a cardiac event  Patient states that she will have her heart feel as though his racing even when getting up just to go to the bathroom from living room which is not a very far walk  Patient was feeling poorly previously along with sore throat and I did order coronavirus testing which took quite some time before the result returned  Coronavirus testing was negative  Patient still complains of developing nausea, tachycardia, palpitations and fatigue with minimal effort  Patient does have history of having cholecystectomy  Last seen by gastroenterologist in October  Patient remains on Nexium  Patient states that eating some greasy foods seem to exacerbate her symptoms  No weight loss    Overall vague symptoms that have been going on for several weeks      Past Medical History:   Diagnosis Date    Breast lump     last assessed 8/24/10    GERD (gastroesophageal reflux disease)     Hyperlipidemia     Infectious viral hepatitis     Hepatitis B    Myalgia     last assessed  1/29/15    Myositis     last assessed  1/29/15    Pernicious anemia        Family History   Problem Relation Age of Onset    Atrial fibrillation Mother     Breast cancer Mother     Hypertension Mother     Hypertension Father     Aneurysm Sister         abdominal aortic    Cancer Maternal Grandmother        Past Surgical History:   Procedure Laterality Date    CHOLECYSTECTOMY      COLONOSCOPY      EGD      FL KNEE SCOPE,SINGLE MENISECTOMY Right 6/3/2019    Procedure: RIGHT KNEE ARTHROSCOPIC PARTIAL MEDIAL MENISCECTOMY; CHONDROPLASTY;  Surgeon: Jose Elias Meadows MD;  Location: [facility];  Service: Orthopedics    [address]          reports that she has quit smoking  She has never used smokeless tobacco  She reports that she drinks alcohol  She reports that she does not use drugs  Current Outpatient Medications:     cholecalciferol (VITAMIN D3) 1,000 units tablet, Take 1,000 Units by mouth daily, Disp: , Rfl:     coenzyme Q-10 100 MG capsule, Take by mouth daily , Disp: , Rfl:     doxycycline (ORACEA) 40 MG capsule, , Disp: , Rfl:     esomeprazole (NexIUM) 40 MG capsule, Take 1 capsule (40 mg total) by mouth daily, Disp: 90 capsule, Rfl: 1    Omega-3 Fatty Acids (FISH OIL) 1,000 mg, Take 1,000 mg by mouth daily , Disp: , Rfl:     Red Yeast Rice 600 MG CAPS, Take 1 capsule (600 mg total) by mouth daily in the early morning, Disp: , Rfl: 0    ALPRAZolam (XANAX) 0 25 mg tablet, Take 1 tablet (0 25 mg total) by mouth 3 (three) times a day as needed for anxiety, Disp: 30 tablet, Rfl: 0    The following portions of the patient's history were reviewed and updated as appropriate: allergies, current medications, past family history, past medical history, past social history, past surgical history and problem list     Review of Systems   Constitutional: Positive for activity change and fatigue  Negative for appetite change  HENT: Negative  Eyes: Negative  Respiratory: Positive for shortness of breath  Negative for wheezing and stridor  Cardiovascular: Positive for palpitations  Negative for chest pain and leg swelling  Gastrointestinal: Positive for abdominal pain and nausea  Negative for vomiting  Endocrine: Negative  Genitourinary: Negative  Musculoskeletal: Negative  Skin: Negative  Allergic/Immunologic: Negative      Neurological: Negative  Hematological: Negative  Psychiatric/Behavioral: Negative  Objective:    /74   Pulse 78   Temp (!) 97 2 °F (36 2 °C) (Tympanic)   Resp 16   Ht 5' 3 5" (1 613 m)   Wt 86 2 kg (190 lb)   LMP 01/01/2016   SpO2 97%   BMI 33 13 kg/m²      Physical Exam   Constitutional: She is oriented to person, place, and time  She appears well-developed and well-nourished  Overweight, no acute distress   HENT:   Head: Normocephalic and atraumatic  Eyes: Pupils are equal, round, and reactive to light  Conjunctivae are normal    Neck: Normal range of motion  Neck supple  Cardiovascular: Normal rate, regular rhythm and normal heart sounds  No murmur heard  I did ambulate the patient around the halls of the office with pulse oximeter on  Oxygen saturation never dropped below 97% and pulse rate was at 92 beats per minute   Pulmonary/Chest: Effort normal and breath sounds normal    Abdominal: Soft  Bowel sounds are normal  She exhibits no distension and no mass  There is tenderness (Midepigastric)  There is no rebound  No hernia  Musculoskeletal: She exhibits no edema  Neurological: She is alert and oriented to person, place, and time  She has normal reflexes  Psychiatric: She has a normal mood and affect  Her behavior is normal  Judgment and thought content normal    Nursing note and vitals reviewed          Recent Results (from the past 1008 hour(s))   Novel Coronavirus (COVID-19), PCR [facility]    Collection Time: 06/29/20  2:35 PM   Result Value Ref Range    SARS-CoV-2  Not Detected Not Detected   ECG 12 lead    Collection Time: 07/05/20  8:41 AM   Result Value Ref Range    Ventricular Rate 87 BPM    Atrial Rate 87 BPM    GA Interval 182 ms    QRSD Interval 96 ms    QT Interval 380 ms    QTC Interval 457 ms    P Axis 50 degrees    QRS Axis -28 degrees    T Wave Axis 34 degrees   CBC and differential    Collection Time: 07/05/20  9:24 AM   Result Value Ref Range    WBC 5 30 4 31 - 10 16 Thousand/uL    RBC 5 15 (H) 3 81 - 5 12 Million/uL    Hemoglobin 15 5 (H) 11 5 - 15 4 g/dL    Hematocrit 45 4 34 8 - 46 1 %    MCV 88 82 - 98 fL    MCH 30 1 26 8 - 34 3 pg    MCHC 34 1 31 4 - 37 4 g/dL    RDW 12 5 11 6 - 15 1 %    MPV 11 9 8 9 - 12 7 fL    Platelets 986 301 - 031 Thousands/uL    nRBC 0 /100 WBCs    Neutrophils Relative 66 43 - 75 %    Immat GRANS % 0 0 - 2 %    Lymphocytes Relative 26 14 - 44 %    Monocytes Relative 6 4 - 12 %    Eosinophils Relative 1 0 - 6 %    Basophils Relative 1 0 - 1 %    Neutrophils Absolute 3 55 1 85 - 7 62 Thousands/µL    Immature Grans Absolute 0 01 0 00 - 0 20 Thousand/uL    Lymphocytes Absolute 1 35 0 60 - 4 47 Thousands/µL    Monocytes Absolute 0 31 0 17 - 1 22 Thousand/µL    Eosinophils Absolute 0 05 0 00 - 0 61 Thousand/µL    Basophils Absolute 0 03 0 00 - 0 10 Thousands/µL   Comprehensive metabolic panel    Collection Time: 07/05/20  9:24 AM   Result Value Ref Range    Sodium 141 136 - 145 mmol/L    Potassium 3 9 3 5 - 5 3 mmol/L    Chloride 106 100 - 108 mmol/L    CO2 24 21 - 32 mmol/L    ANION GAP 11 4 - 13 mmol/L    BUN 15 5 - 25 mg/dL    Creatinine 0 79 0 60 - 1 30 mg/dL    Glucose 101 65 - 140 mg/dL    Calcium 9 0 8 3 - 10 1 mg/dL    AST 28 5 - 45 U/L    ALT 42 12 - 78 U/L    Alkaline Phosphatase 92 46 - 116 U/L    Total Protein 7 3 6 4 - 8 2 g/dL    Albumin 3 8 3 5 - 5 0 g/dL    Total Bilirubin 1 20 (H) 0 20 - 1 00 mg/dL    eGFR 82 ml/min/1 73sq m   Troponin I    Collection Time: 07/05/20  9:24 AM   Result Value Ref Range    Troponin I <0 02 <=0 04 ng/mL   TSH    Collection Time: 07/05/20  9:24 AM   Result Value Ref Range    TSH 3RD GENERATON 1 304 0 358 - 3 740 uIU/mL       Assessment/Plan:    Gastritis  Patient remains on Nexium 40 mg once daily    -I did reach out to Gastroenterology to see about scheduling her  Patient's symptoms are very vague  At some point I think that her symptoms may be gastrointestinal related    I am doubtful that they are cardiac  She is on daily Nexium  Not certain if she needs repeat endoscopy but they will contact her to schedule    Hyperlipidemia  Patient has been intolerant of statins in the past   Patient was trying to make dietary adjustments  She does need to complete labs  She remains on red yeast rice and omega-3 ease over-the-counter    Fatigue  Patient has very vague symptoms  I do not believe that they are cardiac in nature and I am suspecting more gastrointestinal related since the patient does seem to get symptoms after eating foods  She does have history of cholecystectomy  She had normal liver function studies so I am doubtful for ductal stone  May be gastritis  I did review labs from ER which really showed normal CBC, TSH, CMP and her cardiac enzymes were negative     -I will send the patient for stress echo    Anxiety disorder due to known physiological condition  Patient has been somewhat concerned about the health of her daughter who is having issues with swallowing as well as eating  This could be playing in to some of her symptoms  I did provide a prescription for p r n  Alprazolam at a very low dosage and in a limited supply  Patient is very reluctant about taking medication but if she really can not relaxer self she would consider taking 1    Prior to prescribing the controlled substance, a patient search was performed on the [place] prescription drug monitoring program web site  There was no evidence of diversion or misuse  Prescription provided      Dry mouth  Odd symptom  Not entirely certain what to make out of this  I did provide order for COVID antibody testing  Her actual COVID PCR test was negative    If you choose to have a COVID-19 antibody test, you should understand some important limitations of this test   Antibody tests detect the body's immune response to infection rather than detecting the virus itself  It can take weeks for antibodies to show up in the blood    For this reason, antibody tests are not used to detect or manage infection  They may be better for tracking infections in the community  Current antibody tests have not undergone the usual strict FDA approval process  The FDA decided to make it easier to have more tests available  The result is that these new tests may not be reliable  Since COVID-19 antibody testing is so new, we do not know how accurate it is  You can have a positive test and have not actually been infected  You also can have a negative test even though you might have been infected  There are other coronaviruses that are known to cause the common cold  Many people may have antibodies to these other viruses that could make the COVID-19 antibody test positive  More importantly, even if you test positive, this does not necessarily mean you are immune to the virus  Even so, your [facility] provider understands that you may still want a COVID-19 antibody test and can order this test for you  It is important that you review the results with your provider and decide together how to use them  Problem List Items Addressed This Visit        Digestive    Dry mouth     Odd symptom  Not entirely certain what to make out of this  I did provide order for COVID antibody testing  Her actual COVID PCR test was negative    If you choose to have a COVID-19 antibody test, you should understand some important limitations of this test   Antibody tests detect the body's immune response to infection rather than detecting the virus itself  It can take weeks for antibodies to show up in the blood  For this reason, antibody tests are not used to detect or manage infection  They may be better for tracking infections in the community  Current antibody tests have not undergone the usual strict FDA approval process  The FDA decided to make it easier to have more tests available  The result is that these new tests may not be reliable       Since COVID-19 antibody testing is so new, we do not know how accurate it is  You can have a positive test and have not actually been infected  You also can have a negative test even though you might have been infected  There are other coronaviruses that are known to cause the common cold  Many people may have antibodies to these other viruses that could make the COVID-19 antibody test positive  More importantly, even if you test positive, this does not necessarily mean you are immune to the virus  Even so, your [facility] provider understands that you may still want a COVID-19 antibody test and can order this test for you  It is important that you review the results with your provider and decide together how to use them  Relevant Orders    SARS CoV 2 SEROLOGY (COVID 19) AB ([place]), [place]    Gastritis     Patient remains on Nexium 40 mg once daily    -I did reach out to Gastroenterology to see about scheduling her  Patient's symptoms are very vague  At some point I think that her symptoms may be gastrointestinal related  I am doubtful that they are cardiac  She is on daily Nexium  Not certain if she needs repeat endoscopy but they will contact her to schedule            Nervous and Auditory    Anxiety disorder due to known physiological condition     Patient has been somewhat concerned about the health of her daughter who is having issues with swallowing as well as eating  This could be playing in to some of her symptoms  I did provide a prescription for p r n  Alprazolam at a very low dosage and in a limited supply  Patient is very reluctant about taking medication but if she really can not relaxer self she would consider taking 1    Prior to prescribing the controlled substance, a patient search was performed on the [place] prescription drug monitoring program web site  There was no evidence of diversion or misuse    Prescription provided           Relevant Medications    ALPRAZolam (XANAX) 0 25 mg tablet Other    Fatigue - Primary     Patient has very vague symptoms  I do not believe that they are cardiac in nature and I am suspecting more gastrointestinal related since the patient does seem to get symptoms after eating foods  She does have history of cholecystectomy  She had normal liver function studies so I am doubtful for ductal stone  May be gastritis  I did review labs from ER which really showed normal CBC, TSH, CMP and her cardiac enzymes were negative     -I will send the patient for stress echo         Relevant Orders    Echo stress test w contrast if indicated    C-reactive protein    Lyme Antibody Profile with reflex to WB    Anaplasma phagocytophilum Antibodies (IgG,IgM)    SARS CoV 2 SEROLOGY (COVID 19) AB ([facility]), [place]    Hyperlipidemia     Patient has been intolerant of statins in the past   Patient was trying to make dietary adjustments  She does need to complete labs  She remains on red yeast rice and omega-3 ease over-the-counter         Relevant Orders    Lipid panel          BMI Counseling: Body mass index is 33 13 kg/m²  The BMI is above normal  Nutrition recommendations include reducing intake of saturated fat and trans fat and reducing intake of cholesterol  I have spent 44 minutes with Patient  today in which greater than 50% of this time was spent in counseling/coordination of care regarding Diagnostic results, Prognosis, Risks and benefits of tx options, Intructions for management, Patient and family education, Importance of tx compliance, Risk factor reductions and Impressions

## 2020-07-10 NOTE — ASSESSMENT & PLAN NOTE
Patient has very vague symptoms  I do not believe that they are cardiac in nature and I am suspecting more gastrointestinal related since the patient does seem to get symptoms after eating foods  She does have history of cholecystectomy  She had normal liver function studies so I am doubtful for ductal stone  May be gastritis    I did review labs from ER which really showed normal CBC, TSH, CMP and her cardiac enzymes were negative     -I will send the patient for stress echo

## 2020-07-10 NOTE — ASSESSMENT & PLAN NOTE
Odd symptom  Not entirely certain what to make out of this  I did provide order for COVID antibody testing  Her actual COVID PCR test was negative    If you choose to have a COVID-19 antibody test, you should understand some important limitations of this test   Antibody tests detect the body's immune response to infection rather than detecting the virus itself  It can take weeks for antibodies to show up in the blood  For this reason, antibody tests are not used to detect or manage infection  They may be better for tracking infections in the community  Current antibody tests have not undergone the usual strict FDA approval process  The FDA decided to make it easier to have more tests available  The result is that these new tests may not be reliable  Since COVID-19 antibody testing is so new, we do not know how accurate it is  You can have a positive test and have not actually been infected  You also can have a negative test even though you might have been infected  There are other coronaviruses that are known to cause the common cold  Many people may have antibodies to these other viruses that could make the COVID-19 antibody test positive  More importantly, even if you test positive, this does not necessarily mean you are immune to the virus  Even so, your [facility] provider understands that you may still want a COVID-19 antibody test and can order this test for you  It is important that you review the results with your provider and decide together how to use them

## 2020-07-10 NOTE — ASSESSMENT & PLAN NOTE
Patient remains on Nexium 40 mg once daily    -I did reach out to Gastroenterology to see about scheduling her  Patient's symptoms are very vague  At some point I think that her symptoms may be gastrointestinal related  I am doubtful that they are cardiac  She is on daily Nexium    Not certain if she needs repeat endoscopy but they will contact her to schedule

## 2020-07-10 NOTE — ASSESSMENT & PLAN NOTE
Patient has been intolerant of statins in the past   Patient was trying to make dietary adjustments  She does need to complete labs    She remains on red yeast rice and omega-3 ease over-the-counter

## 2020-07-11 ENCOUNTER — LAB (OUTPATIENT)
Dept: LAB | Facility: HOSPITAL | Age: 60
End: 2020-07-11
Payer: COMMERCIAL

## 2020-07-11 ENCOUNTER — TRANSCRIBE ORDERS (OUTPATIENT)
Dept: ADMINISTRATIVE | Facility: HOSPITAL | Age: 60
End: 2020-07-11

## 2020-07-11 DIAGNOSIS — R68.2 DRY MOUTH: ICD-10-CM

## 2020-07-11 DIAGNOSIS — Z00.00 PHYSICAL EXAM, ANNUAL: ICD-10-CM

## 2020-07-11 DIAGNOSIS — E78.2 MIXED HYPERLIPIDEMIA: ICD-10-CM

## 2020-07-11 DIAGNOSIS — R53.83 FATIGUE, UNSPECIFIED TYPE: ICD-10-CM

## 2020-07-11 LAB
ALBUMIN SERPL BCP-MCNC: 3.9 G/DL (ref 3.5–5)
ALP SERPL-CCNC: 96 U/L (ref 46–116)
ALT SERPL W P-5'-P-CCNC: 36 U/L (ref 12–78)
ANION GAP SERPL CALCULATED.3IONS-SCNC: 9 MMOL/L (ref 4–13)
AST SERPL W P-5'-P-CCNC: 19 U/L (ref 5–45)
BASOPHILS # BLD AUTO: 0.03 THOUSANDS/ΜL (ref 0–0.1)
BASOPHILS NFR BLD AUTO: 1 % (ref 0–1)
BILIRUB SERPL-MCNC: 1.3 MG/DL (ref 0.2–1)
BUN SERPL-MCNC: 12 MG/DL (ref 5–25)
CALCIUM SERPL-MCNC: 9 MG/DL (ref 8.3–10.1)
CHLORIDE SERPL-SCNC: 104 MMOL/L (ref 100–108)
CHOLEST SERPL-MCNC: 226 MG/DL (ref 50–200)
CO2 SERPL-SCNC: 27 MMOL/L (ref 21–32)
CREAT SERPL-MCNC: 0.8 MG/DL (ref 0.6–1.3)
CRP SERPL QL: 1.3 MG/L
EOSINOPHIL # BLD AUTO: 0.08 THOUSAND/ΜL (ref 0–0.61)
EOSINOPHIL NFR BLD AUTO: 1 % (ref 0–6)
ERYTHROCYTE [DISTWIDTH] IN BLOOD BY AUTOMATED COUNT: 12.5 % (ref 11.6–15.1)
GFR SERPL CREATININE-BSD FRML MDRD: 81 ML/MIN/1.73SQ M
GLUCOSE P FAST SERPL-MCNC: 90 MG/DL (ref 65–99)
HCT VFR BLD AUTO: 47.7 % (ref 34.8–46.1)
HDLC SERPL-MCNC: 61 MG/DL
HGB BLD-MCNC: 15.9 G/DL (ref 11.5–15.4)
IMM GRANULOCYTES # BLD AUTO: 0.01 THOUSAND/UL (ref 0–0.2)
IMM GRANULOCYTES NFR BLD AUTO: 0 % (ref 0–2)
LDLC SERPL CALC-MCNC: 138 MG/DL (ref 0–100)
LYMPHOCYTES # BLD AUTO: 1.66 THOUSANDS/ΜL (ref 0.6–4.47)
LYMPHOCYTES NFR BLD AUTO: 27 % (ref 14–44)
MCH RBC QN AUTO: 29.7 PG (ref 26.8–34.3)
MCHC RBC AUTO-ENTMCNC: 33.3 G/DL (ref 31.4–37.4)
MCV RBC AUTO: 89 FL (ref 82–98)
MONOCYTES # BLD AUTO: 0.35 THOUSAND/ΜL (ref 0.17–1.22)
MONOCYTES NFR BLD AUTO: 6 % (ref 4–12)
NEUTROPHILS # BLD AUTO: 3.99 THOUSANDS/ΜL (ref 1.85–7.62)
NEUTS SEG NFR BLD AUTO: 65 % (ref 43–75)
NONHDLC SERPL-MCNC: 165 MG/DL
NRBC BLD AUTO-RTO: 0 /100 WBCS
PLATELET # BLD AUTO: 189 THOUSANDS/UL (ref 149–390)
PMV BLD AUTO: 12.1 FL (ref 8.9–12.7)
POTASSIUM SERPL-SCNC: 3.9 MMOL/L (ref 3.5–5.3)
PROT SERPL-MCNC: 7.6 G/DL (ref 6.4–8.2)
RBC # BLD AUTO: 5.36 MILLION/UL (ref 3.81–5.12)
SODIUM SERPL-SCNC: 140 MMOL/L (ref 136–145)
TRIGL SERPL-MCNC: 134 MG/DL
WBC # BLD AUTO: 6.12 THOUSAND/UL (ref 4.31–10.16)

## 2020-07-11 PROCEDURE — 85025 COMPLETE CBC W/AUTO DIFF WBC: CPT

## 2020-07-11 PROCEDURE — 86769 SARS-COV-2 COVID-19 ANTIBODY: CPT

## 2020-07-11 PROCEDURE — 86666 EHRLICHIA ANTIBODY: CPT

## 2020-07-11 PROCEDURE — 80061 LIPID PANEL: CPT

## 2020-07-11 PROCEDURE — 86140 C-REACTIVE PROTEIN: CPT

## 2020-07-11 PROCEDURE — 86618 LYME DISEASE ANTIBODY: CPT

## 2020-07-11 PROCEDURE — 36415 COLL VENOUS BLD VENIPUNCTURE: CPT

## 2020-07-11 PROCEDURE — 80053 COMPREHEN METABOLIC PANEL: CPT

## 2020-07-12 LAB — SARS-COV-2 IGG+IGM SERPL QL IA: NORMAL

## 2020-07-13 ENCOUNTER — TRANSCRIBE ORDERS (OUTPATIENT)
Dept: ADMINISTRATIVE | Facility: HOSPITAL | Age: 60
End: 2020-07-13

## 2020-07-13 ENCOUNTER — APPOINTMENT (OUTPATIENT)
Dept: LAB | Facility: HOSPITAL | Age: 60
End: 2020-07-13
Payer: COMMERCIAL

## 2020-07-13 DIAGNOSIS — R53.83 FATIGUE DUE TO DEPRESSION: ICD-10-CM

## 2020-07-13 DIAGNOSIS — F32.A FATIGUE DUE TO DEPRESSION: ICD-10-CM

## 2020-07-13 DIAGNOSIS — R10.13 ABDOMINAL PAIN, EPIGASTRIC: ICD-10-CM

## 2020-07-13 DIAGNOSIS — R10.13 ABDOMINAL PAIN, EPIGASTRIC: Primary | ICD-10-CM

## 2020-07-13 LAB
LIPASE SERPL-CCNC: 164 U/L (ref 73–393)
VIT B12 SERPL-MCNC: 394 PG/ML (ref 100–900)

## 2020-07-13 PROCEDURE — 82607 VITAMIN B-12: CPT

## 2020-07-13 PROCEDURE — 36415 COLL VENOUS BLD VENIPUNCTURE: CPT

## 2020-07-13 PROCEDURE — 83690 ASSAY OF LIPASE: CPT

## 2020-07-14 LAB
B BURGDOR IGG+IGM SER-ACNC: <0.91 ISR (ref 0–0.9)
MISCELLANEOUS LAB TEST RESULT: NORMAL

## 2020-07-15 ENCOUNTER — HOSPITAL ENCOUNTER (OUTPATIENT)
Dept: NON INVASIVE DIAGNOSTICS | Facility: HOSPITAL | Age: 60
Discharge: HOME | End: 2020-07-15
Payer: COMMERCIAL

## 2020-07-15 DIAGNOSIS — R53.83 FATIGUE, UNSPECIFIED TYPE: ICD-10-CM

## 2020-07-15 LAB
ARRHY DURING EX: NORMAL
CHEST PAIN STATEMENT: NORMAL
MAX DIASTOLIC BP: 80 MMHG
MAX HEART RATE: 187 BPM
MAX PREDICTED HEART RATE: 161 BPM
MAX. SYSTOLIC BP: 146 MMHG
PROTOCOL NAME: NORMAL
TARGET HR FORMULA: NORMAL
TEST INDICATION: NORMAL
TIME IN EXERCISE PHASE: NORMAL

## 2020-07-15 PROCEDURE — 93350 STRESS TTE ONLY: CPT

## 2020-07-15 PROCEDURE — 93351 STRESS TTE COMPLETE: CPT | Performed by: INTERNAL MEDICINE

## 2020-07-16 NOTE — RESULT ENCOUNTER NOTE
Please call patient and notify that results are normal   Normal stress echocardiogram   No evidence of ischemia    I would recommend following up with Gastroenterology as discussed at her office appointment for possible explanation

## 2020-07-16 NOTE — RESULT ENCOUNTER NOTE
Please call patient and notify that results are essentially normal   Anaplasma titer negative, Lyme serology negative, C-reactive protein normal   Coronavirus antibodies are also negative  Cholesterol is acceptable  Electrolytes look fine as do liver functions and kidney functions    She also had a completely normal stress echocardiogram   I have no good explanation as to why she is feeling poorly

## 2020-07-17 ENCOUNTER — TELEPHONE (OUTPATIENT)
Dept: FAMILY MEDICINE CLINIC | Facility: CLINIC | Age: 60
End: 2020-07-17

## 2020-07-17 NOTE — TELEPHONE ENCOUNTER
Obey Huston is requesting a note for work stating that she has been under Dr Brannon Canela care since 6/29 until present for her medical issues  Would like note faxed to [phone]  Please inform Obey Huston if the note can be done and faxed

## 2020-07-27 ENCOUNTER — TRANSCRIBE ORDERS (OUTPATIENT)
Dept: ADMINISTRATIVE | Facility: HOSPITAL | Age: 60
End: 2020-07-27

## 2020-07-27 DIAGNOSIS — R10.13 ABDOMINAL PAIN, EPIGASTRIC: ICD-10-CM

## 2020-07-27 DIAGNOSIS — K59.00 CONSTIPATION, UNSPECIFIED CONSTIPATION TYPE: Primary | ICD-10-CM

## 2020-07-29 ENCOUNTER — HOSPITAL ENCOUNTER (OUTPATIENT)
Dept: CT IMAGING | Facility: HOSPITAL | Age: 60
Discharge: HOME | End: 2020-07-29
Payer: COMMERCIAL

## 2020-07-29 DIAGNOSIS — K59.00 CONSTIPATION, UNSPECIFIED CONSTIPATION TYPE: ICD-10-CM

## 2020-07-29 DIAGNOSIS — R10.13 ABDOMINAL PAIN, EPIGASTRIC: ICD-10-CM

## 2020-07-29 PROCEDURE — 74177 CT ABD & PELVIS W/CONTRAST: CPT

## 2020-07-29 RX ADMIN — IOHEXOL 100 ML: 350 INJECTION, SOLUTION INTRAVENOUS at 18:57

## 2020-08-10 ENCOUNTER — TELEPHONE (OUTPATIENT)
Dept: FAMILY MEDICINE CLINIC | Facility: CLINIC | Age: 60
End: 2020-08-10

## 2020-08-10 NOTE — TELEPHONE ENCOUNTER
Katharine Akhtar would like to see a cardiologist, was wondering if theres anyone that Dr Bob Will recommends

## 2020-08-11 NOTE — TELEPHONE ENCOUNTER
Since she has an appointment scheduled with Dr Kong Figueredo on August 25th she might is well keep that appointment

## 2020-08-12 ENCOUNTER — APPOINTMENT (EMERGENCY)
Dept: RADIOLOGY | Facility: HOSPITAL | Age: 60
End: 2020-08-12
Payer: COMMERCIAL

## 2020-08-12 ENCOUNTER — HOSPITAL ENCOUNTER (EMERGENCY)
Facility: HOSPITAL | Age: 60
Discharge: HOME | End: 2020-08-12
Attending: EMERGENCY MEDICINE | Admitting: EMERGENCY MEDICINE
Payer: COMMERCIAL

## 2020-08-12 VITALS
BODY MASS INDEX: 30.73 KG/M2 | HEART RATE: 76 BPM | WEIGHT: 180 LBS | RESPIRATION RATE: 20 BRPM | OXYGEN SATURATION: 98 % | HEIGHT: 64 IN | DIASTOLIC BLOOD PRESSURE: 91 MMHG | SYSTOLIC BLOOD PRESSURE: 125 MMHG | TEMPERATURE: 97.6 F

## 2020-08-12 DIAGNOSIS — R07.89 CHEST PRESSURE: Primary | ICD-10-CM

## 2020-08-12 LAB
ANION GAP SERPL CALCULATED.3IONS-SCNC: 7 MMOL/L (ref 4–13)
BASOPHILS # BLD AUTO: 0.02 THOUSANDS/ΜL (ref 0–0.1)
BASOPHILS NFR BLD AUTO: 0 % (ref 0–1)
BUN SERPL-MCNC: 13 MG/DL (ref 5–25)
CALCIUM SERPL-MCNC: 9 MG/DL (ref 8.3–10.1)
CHLORIDE SERPL-SCNC: 109 MMOL/L (ref 100–108)
CO2 SERPL-SCNC: 26 MMOL/L (ref 21–32)
CREAT SERPL-MCNC: 0.9 MG/DL (ref 0.6–1.3)
EOSINOPHIL # BLD AUTO: 0.13 THOUSAND/ΜL (ref 0–0.61)
EOSINOPHIL NFR BLD AUTO: 2 % (ref 0–6)
ERYTHROCYTE [DISTWIDTH] IN BLOOD BY AUTOMATED COUNT: 13.1 % (ref 11.6–15.1)
GFR SERPL CREATININE-BSD FRML MDRD: 70 ML/MIN/1.73SQ M
GLUCOSE SERPL-MCNC: 107 MG/DL (ref 65–140)
HCT VFR BLD AUTO: 42.2 % (ref 34.8–46.1)
HGB BLD-MCNC: 14.4 G/DL (ref 11.5–15.4)
IMM GRANULOCYTES # BLD AUTO: 0.02 THOUSAND/UL (ref 0–0.2)
IMM GRANULOCYTES NFR BLD AUTO: 0 % (ref 0–2)
LYMPHOCYTES # BLD AUTO: 1.84 THOUSANDS/ΜL (ref 0.6–4.47)
LYMPHOCYTES NFR BLD AUTO: 31 % (ref 14–44)
MCH RBC QN AUTO: 29.8 PG (ref 26.8–34.3)
MCHC RBC AUTO-ENTMCNC: 34.1 G/DL (ref 31.4–37.4)
MCV RBC AUTO: 87 FL (ref 82–98)
MONOCYTES # BLD AUTO: 0.43 THOUSAND/ΜL (ref 0.17–1.22)
MONOCYTES NFR BLD AUTO: 7 % (ref 4–12)
NEUTROPHILS # BLD AUTO: 3.46 THOUSANDS/ΜL (ref 1.85–7.62)
NEUTS SEG NFR BLD AUTO: 60 % (ref 43–75)
NRBC BLD AUTO-RTO: 0 /100 WBCS
PLATELET # BLD AUTO: 203 THOUSANDS/UL (ref 149–390)
PMV BLD AUTO: 12.2 FL (ref 8.9–12.7)
POTASSIUM SERPL-SCNC: 3.5 MMOL/L (ref 3.5–5.3)
RBC # BLD AUTO: 4.84 MILLION/UL (ref 3.81–5.12)
SODIUM SERPL-SCNC: 142 MMOL/L (ref 136–145)
TROPONIN I SERPL-MCNC: <0.02 NG/ML
WBC # BLD AUTO: 5.9 THOUSAND/UL (ref 4.31–10.16)

## 2020-08-12 PROCEDURE — 85025 COMPLETE CBC W/AUTO DIFF WBC: CPT | Performed by: EMERGENCY MEDICINE

## 2020-08-12 PROCEDURE — 84484 ASSAY OF TROPONIN QUANT: CPT | Performed by: EMERGENCY MEDICINE

## 2020-08-12 PROCEDURE — 93005 ELECTROCARDIOGRAM TRACING: CPT

## 2020-08-12 PROCEDURE — 36415 COLL VENOUS BLD VENIPUNCTURE: CPT | Performed by: EMERGENCY MEDICINE

## 2020-08-12 PROCEDURE — 99285 EMERGENCY DEPT VISIT HI MDM: CPT

## 2020-08-12 PROCEDURE — 71045 X-RAY EXAM CHEST 1 VIEW: CPT

## 2020-08-12 PROCEDURE — 99285 EMERGENCY DEPT VISIT HI MDM: CPT | Performed by: EMERGENCY MEDICINE

## 2020-08-12 PROCEDURE — 80048 BASIC METABOLIC PNL TOTAL CA: CPT | Performed by: EMERGENCY MEDICINE

## 2020-08-12 RX ORDER — MAGNESIUM HYDROXIDE/ALUMINUM HYDROXICE/SIMETHICONE 120; 1200; 1200 MG/30ML; MG/30ML; MG/30ML
30 SUSPENSION ORAL ONCE
Status: DISCONTINUED | OUTPATIENT
Start: 2020-08-12 | End: 2020-08-13 | Stop reason: HOSPADM

## 2020-08-12 RX ORDER — LIDOCAINE HYDROCHLORIDE 20 MG/ML
10 SOLUTION OROPHARYNGEAL ONCE
Status: DISCONTINUED | OUTPATIENT
Start: 2020-08-12 | End: 2020-08-13 | Stop reason: HOSPADM

## 2020-08-12 RX ORDER — SODIUM CHLORIDE 9 MG/ML
3 INJECTION INTRAVENOUS
Status: DISCONTINUED | OUTPATIENT
Start: 2020-08-12 | End: 2020-08-13 | Stop reason: HOSPADM

## 2020-08-12 NOTE — ED PROVIDER NOTES
History  Chief Complaint   Patient presents with    Chest Pain     57-year-old female with previous medical history of hyperlipidemia, obesity, chronic chest pain presenting emergency department with chest pressure  Patient states that she gets nearly daily scraping pain in her anterior chest   This morning patient took her heart rate and found herself to be tachycardic  Patient and started getting slightly more tachycardic as she was staring at her pulse ox '    Pain has been constant since this morning  Patient denies shortness of breath  This morning patient had chest pressure which has continued throughout the day  No aggravating or alleviating circumstances  Patient had normal stress echo test within the last month  Patient had a EGD performed within the last few months which demonstrated gastritis only  Patient is taking her gastritis medication as prescribed  Patient denies DVT, PE history, lower extremity edema, estrogen supplementation, recent travel, hospitalization  Chest Pain   Pain quality: pressure    Pain radiates to:  Does not radiate  Pain severity:  Mild  Onset quality:  Gradual  Timing:  Constant  Worsened by:  Nothing tried  Ineffective treatments:  None tried      Prior to Admission Medications   Prescriptions Last Dose Informant Patient Reported? Taking?    ALPRAZolam (XANAX) 0 25 mg tablet   No No   Sig: Take 1 tablet (0 25 mg total) by mouth 3 (three) times a day as needed for anxiety   Omega-3 Fatty Acids (FISH OIL) 1,000 mg   Yes No   Sig: Take 1,000 mg by mouth daily    Red Yeast Rice 600 MG CAPS   No No   Sig: Take 1 capsule (600 mg total) by mouth daily in the early morning   cholecalciferol (VITAMIN D3) 1,000 units tablet   Yes No   Sig: Take 1,000 Units by mouth daily   coenzyme Q-10 100 MG capsule   Yes No   Sig: Take by mouth daily    doxycycline (ORACEA) 40 MG capsule   Yes No   esomeprazole (NexIUM) 40 MG capsule   No No   Sig: Take 1 capsule (40 mg total) by mouth daily      Facility-Administered Medications: None       Past Medical History:   Diagnosis Date    Breast lump     last assessed 8/24/10    GERD (gastroesophageal reflux disease)     Hyperlipidemia     Infectious viral hepatitis     Hepatitis B    Myalgia     last assessed  1/29/15    Myositis     last assessed  1/29/15    Pernicious anemia        Past Surgical History:   Procedure Laterality Date    CHOLECYSTECTOMY      COLONOSCOPY      EGD      NM KNEE SCOPE,SINGLE MENISECTOMY Right 6/3/2019    Procedure: RIGHT KNEE ARTHROSCOPIC PARTIAL MEDIAL MENISCECTOMY; CHONDROPLASTY;  Surgeon: Sarah Mart MD;  Location[facility];  Service: Orthopedics    TUBAL LIGATION      URETHRAL SLING         Family History   Problem Relation Age of Onset    Atrial fibrillation Mother    Francisca Jimenez Breast cancer Mother     Hypertension Mother     Hypertension Father     Aneurysm Sister         abdominal aortic    Cancer Maternal Grandmother      I have reviewed and agree with the history as documented  E-Cigarette/Vaping    E-Cigarette Use Never User      E-Cigarette/Vaping Substances     Social History     Tobacco Use    Smoking status: Former Smoker    Smokeless tobacco: Never Used    Tobacco comment: Quit at age 32   Substance Use Topics    Alcohol use: Yes     Frequency: Monthly or less     Drinks per session: 1 or 2     Binge frequency: Never     Comment: socially    Drug use: No        Review of Systems   Cardiovascular: Positive for chest pain  All other systems reviewed and are negative        Physical Exam  ED Triage Vitals [08/12/20 1901]   Temperature Pulse Respirations Blood Pressure SpO2   98 2 °F (36 8 °C) 81 20 (!) 134/102 100 %      Temp Source Heart Rate Source Patient Position - Orthostatic VS BP Location FiO2 (%)   Oral Monitor Sitting Left arm --      Pain Score       No Pain             Orthostatic Vital Signs  Vitals:    08/12/20 1901 08/12/20 1926   BP: (!) 134/102 125/91   Pulse: 81 76 Patient Position - Orthostatic VS: Sitting        Physical Exam  Constitutional:       General: She is not in acute distress  Appearance: She is well-developed  She is not diaphoretic  HENT:      Head: Normocephalic and atraumatic  Right Ear: External ear normal       Left Ear: External ear normal    Eyes:      Conjunctiva/sclera: Conjunctivae normal    Neck:      Vascular: No JVD  Trachea: No tracheal deviation  Cardiovascular:      Rate and Rhythm: Normal rate and regular rhythm  Heart sounds: Normal heart sounds  No murmur  Pulmonary:      Effort: No respiratory distress  Breath sounds: Normal breath sounds  No stridor  No wheezing or rales  Abdominal:      General: Bowel sounds are normal  There is no distension  Palpations: Abdomen is soft  There is no mass  Tenderness: There is no abdominal tenderness  There is no guarding or rebound  Genitourinary:     Comments: Deferred  Musculoskeletal:         General: No tenderness or deformity  Right lower leg: No edema  Left lower leg: No edema  Skin:     General: Skin is warm and dry  Capillary Refill: Capillary refill takes less than 2 seconds  Coloration: Skin is not pale  Findings: No erythema or rash  Neurological:      Motor: No abnormal muscle tone  Coordination: Coordination normal    Psychiatric:         Behavior: Behavior normal          Thought Content:  Thought content normal          Judgment: Judgment normal          ED Medications  Medications   sodium chloride (PF) 0 9 % injection 3 mL (has no administration in time range)   Lidocaine Viscous HCl (XYLOCAINE) 2 % mucosal solution 10 mL (has no administration in time range)   aluminum-magnesium hydroxide-simethicone (MYLANTA) 200-200-20 mg/5 mL oral suspension 30 mL (has no administration in time range)       Diagnostic Studies  Results Reviewed     Procedure Component Value Units Date/Time    Troponin I [[id number]]  (Normal) Collected:  08/12/20 1950    Lab Status:  Final result Specimen:  Blood from Arm, Left Updated:  08/12/20 2021     Troponin I <0 02 ng/mL     Basic metabolic panel [[id number]]  (Abnormal) Collected:  08/12/20 1950    Lab Status:  Final result Specimen:  Blood from Arm, Left Updated:  08/12/20 2017     Sodium 142 mmol/L      Potassium 3 5 mmol/L      Chloride 109 mmol/L      CO2 26 mmol/L      ANION GAP 7 mmol/L      BUN 13 mg/dL      Creatinine 0 90 mg/dL      Glucose 107 mg/dL      Calcium 9 0 mg/dL      eGFR 70 ml/min/1 73sq m     Narrative:       [place] guidelines for Chronic Kidney Disease (CKD):     Stage 1 with normal or high GFR (GFR > 90 mL/min/1 73 square meters)    Stage 2 Mild CKD (GFR = 60-89 mL/min/1 73 square meters)    Stage 3A Moderate CKD (GFR = 45-59 mL/min/1 73 square meters)    Stage 3B Moderate CKD (GFR = 30-44 mL/min/1 73 square meters)    Stage 4 Severe CKD (GFR = 15-29 mL/min/1 73 square meters)    Stage 5 End Stage CKD (GFR <15 mL/min/1 73 square meters)  Note: GFR calculation is accurate only with a steady state creatinine    CBC and differential [[id number]] Collected:  08/12/20 1950    Lab Status:  Final result Specimen:  Blood from Arm, Left Updated:  08/12/20 2008     WBC 5 90 Thousand/uL      RBC 4 84 Million/uL      Hemoglobin 14 4 g/dL      Hematocrit 42 2 %      MCV 87 fL      MCH 29 8 pg      MCHC 34 1 g/dL      RDW 13 1 %      MPV 12 2 fL      Platelets 127 Thousands/uL      nRBC 0 /100 WBCs      Neutrophils Relative 60 %      Immat GRANS % 0 %      Lymphocytes Relative 31 %      Monocytes Relative 7 %      Eosinophils Relative 2 %      Basophils Relative 0 %      Neutrophils Absolute 3 46 Thousands/µL      Immature Grans Absolute 0 02 Thousand/uL      Lymphocytes Absolute 1 84 Thousands/µL      Monocytes Absolute 0 43 Thousand/µL      Eosinophils Absolute 0 13 Thousand/µL      Basophils Absolute 0 02 Thousands/µL                  X-ray chest 1 view portable   ED Interpretation by Bhavani Patton DO (08/12 2112)   No acute cardiopulmonary process  Procedures  ECG 12 Lead Documentation Only    Date/Time: 8/12/2020 8:46 PM  Performed by: Bhavani Patton DO  Authorized by: Bhavani Patton DO     Comments:        Comparison EKG July 5, 2020  Normal sinus rhythm, ventricular rate 65 beats per minute, left axis  No ST elevations, no ST depressions, no T-wave inversions, no Q-waves   milliseconds, , [place] interval 186  ED Course       US AUDIT      Most Recent Value   Initial Alcohol Screen: US AUDIT-C    1  How often do you have a drink containing alcohol?  0 Filed at: 08/12/2020 1929   3b  FEMALE Any Age, or MALE 65+: How often do you have 4 or more drinks on one occassion? 0 Filed at: 08/12/2020 1929   Audit-C Score  0 Filed at: 08/12/2020 1929            HEART Risk Score      Most Recent Value   Heart Score Risk Calculator   History  0 Filed at: 08/12/2020 2047   ECG  0 Filed at: 08/12/2020 2047   Age  1 Filed at: 08/12/2020 2047   Risk Factors  1 Filed at: 08/12/2020 2047   Troponin  0 Filed at: 08/12/2020 2047   HEART Score  2 Filed at: 08/12/2020 2047            SYDNEE/DAST-10      Most Recent Value   How many times in the past year have you    Used an illegal drug or used a prescription medication for non-medical reasons? Never Filed at: 08/12/2020 1930                              MDM  Number of Diagnoses or Management Options  Chest pressure: established and worsening  Diagnosis management comments: 61-year-old female with a chronic history of chest pain that has been worked up considerably in the past including a normal stress echo less than a month ago, EGD showing gastritis within last few months  Patient states that she has had months of chest discomfort  Now chest pressure from this morning  No associated symptoms  No aggravating or alleviating circumstances    Patient is in no acute discomfort on examination but appears anxious  Physical examination without acute abnormalities  No lower extremity edema  Patient has no risk factors for DVT  Is not short of breath  Perc negative other than age  Saturating well on room air  I did not believe this is like likely to be a DVT and does not warrant a D-dimer with the increased risk of possible radiation  No signs or symptoms of dissection  No carotid bruits  Unlikely to be dissection  Will rule out CAD with troponin, ECG  ECG troponin without acute abnormalities  Patient has had pain consistently for greater than 12 hours  Do not need to perform a delta troponin  Checked for pneumothorax, pneumonia  Normal chest x-ray  Patient discharged home  Patient follow-up with cardiologist as scheduled thinner this month  Patient told to return with new or worsening pain, shortness of breath, syncope  Amount and/or Complexity of Data Reviewed  Clinical lab tests: ordered and reviewed  Tests in the radiology section of CPT®: ordered and reviewed  Decide to obtain previous medical records or to obtain history from someone other than the patient: yes  Review and summarize past medical records: yes  Independent visualization of images, tracings, or specimens: yes    Risk of Complications, Morbidity, and/or Mortality  Presenting problems: moderate  Diagnostic procedures: moderate  Management options: moderate          Disposition  Final diagnoses:   Chest pressure     Time reflects when diagnosis was documented in both MDM as applicable and the Disposition within this note     Time User Action Codes Description Comment    8/12/2020  8:48 PM Debora Handing Add [R07 89] Chest pressure       ED Disposition     ED Disposition Condition Date/Time Comment    Discharge Stable Wed Aug 12, 2020  8:47 PM Nicholas Moore discharge to home/self care              Follow-up Information     Follow up With Specialties Details Why Contact Info Additional [address] Emergency Department Emergency Medicine  If symptoms worsen [address]  [phone]  ED, [address] [address], [place], [place], [address]   [phone]          Discharge Medication List as of 8/12/2020  9:27 PM      CONTINUE these medications which have NOT CHANGED    Details   ALPRAZolam (XANAX) 0 25 mg tablet Take 1 tablet (0 25 mg total) by mouth 3 (three) times a day as needed for anxiety, Starting Fri 7/10/2020, Normal      cholecalciferol (VITAMIN D3) 1,000 units tablet Take 1,000 Units by mouth daily, Historical Med      coenzyme Q-10 100 MG capsule Take by mouth daily , Starting Thu 9/28/2017, Historical Med      doxycycline (ORACEA) 40 MG capsule Starting Tue 3/24/2020, Historical Med      esomeprazole (NexIUM) 40 MG capsule Take 1 capsule (40 mg total) by mouth daily, Starting Mon 4/27/2020, Normal      Omega-3 Fatty Acids (FISH OIL) 1,000 mg Take 1,000 mg by mouth daily , Starting Thu 9/28/2017, Historical Med      Red Yeast Rice 600 MG CAPS Take 1 capsule (600 mg total) by mouth daily in the early morning, Starting Tue 5/8/2018, No Print           No discharge procedures on file  PDMP Review     None           ED Provider  Attending physically available and evaluated Sachin Tineo I managed the patient along with the ED Attending      Electronically Signed by         Jeanette Wong DO  08/12/20 0855

## 2020-08-12 NOTE — ED ATTENDING ATTESTATION
8/12/2020  IShelbi MD, saw and evaluated the patient  I have discussed the patient with the resident/non-physician practitioner and agree with the resident's/non-physician practitioner's findings, Plan of Care, and MDM as documented in the resident's/non-physician practitioner's note, except where noted  All available labs and Radiology studies were reviewed  I was present for key portions of any procedure(s) performed by the resident/non-physician practitioner and I was immediately available to provide assistance  At this point I agree with the current assessment done in the Emergency Department  I have conducted an independent evaluation of this patient a history and physical is as follows:    ED Course         Critical Care Time  Procedures     62 yo female with hld, obesity, c/o chest pressure since this morning  Pt with chronic chest pain which is different than todays pain  Pt with recent egd, stress echo within the last month  Pt with gastritis on meds  No sob, no n/v/diaphoresis, no pe risk factors  Vss, afebrile, lungs cta, rrr, anxious appearing, abdomen soft nontender, no pedal edema    Cardiac workup,

## 2020-08-13 ENCOUNTER — TELEPHONE (OUTPATIENT)
Dept: CARDIOLOGY CLINIC | Facility: CLINIC | Age: 60
End: 2020-08-13

## 2020-08-13 LAB
ATRIAL RATE: 65 BPM
P AXIS: 57 DEGREES
PR INTERVAL: 186 MS
QRS AXIS: -4 DEGREES
QRSD INTERVAL: 100 MS
QT INTERVAL: 396 MS
QTC INTERVAL: 411 MS
T WAVE AXIS: 54 DEGREES
VENTRICULAR RATE: 65 BPM

## 2020-08-13 PROCEDURE — 93010 ELECTROCARDIOGRAM REPORT: CPT | Performed by: INTERNAL MEDICINE

## 2020-08-13 NOTE — TELEPHONE ENCOUNTER
Elevated , 130, 168 - some scratchy chest pain, chest pressure - went to ED last night (no heart attack) - pt had echo stress test with pcp - also went to gastro physician - all test are normal/unremarkable - gastro feels a holter monitor is necessary - pt wanted to be seen earlier    Scheduled 8/25/20 with Dr Talib Vaughn - pt diagnosed with gastritis - stomach feels somewhat better - can she have a holter ordered earlier to wear before the appt - advise    She will also contact Dr Kaye Naik to see if she can be seen with him

## 2020-08-19 ENCOUNTER — OFFICE VISIT (OUTPATIENT)
Dept: CARDIOLOGY CLINIC | Facility: CLINIC | Age: 60
End: 2020-08-19
Payer: COMMERCIAL

## 2020-08-19 ENCOUNTER — PROCEDURE VISIT (OUTPATIENT)
Dept: CARDIOLOGY CLINIC | Facility: CLINIC | Age: 60
End: 2020-08-19
Payer: COMMERCIAL

## 2020-08-19 VITALS
DIASTOLIC BLOOD PRESSURE: 88 MMHG | TEMPERATURE: 97 F | HEART RATE: 100 BPM | BODY MASS INDEX: 31.86 KG/M2 | SYSTOLIC BLOOD PRESSURE: 120 MMHG | HEIGHT: 64 IN | WEIGHT: 186.6 LBS | OXYGEN SATURATION: 98 %

## 2020-08-19 DIAGNOSIS — R00.2 PALPITATIONS: Primary | ICD-10-CM

## 2020-08-19 DIAGNOSIS — R07.89 SENSATION OF CHEST PRESSURE: ICD-10-CM

## 2020-08-19 PROCEDURE — 3008F BODY MASS INDEX DOCD: CPT | Performed by: INTERNAL MEDICINE

## 2020-08-19 PROCEDURE — 3074F SYST BP LT 130 MM HG: CPT | Performed by: INTERNAL MEDICINE

## 2020-08-19 PROCEDURE — 3079F DIAST BP 80-89 MM HG: CPT | Performed by: INTERNAL MEDICINE

## 2020-08-19 PROCEDURE — 1036F TOBACCO NON-USER: CPT | Performed by: INTERNAL MEDICINE

## 2020-08-19 PROCEDURE — 99215 OFFICE O/P EST HI 40 MIN: CPT | Performed by: INTERNAL MEDICINE

## 2020-08-19 PROCEDURE — RECHECK: Performed by: INTERNAL MEDICINE

## 2020-08-19 NOTE — PROGRESS NOTES
Consult - Cardiology   Negrito Moore 61 y o  female MRN: [id number]        Problems    Problem List Items Addressed This Visit     None      Visit Diagnoses     Palpitations    -  Primary    Sensation of chest pressure                Plan major issue here is her heart rate is greatly variable she feels the palpitations  I would do a 48 hour Holter  The stress echo was very much normal   I do not think we need to proceed with catheterization  She does not have exertional chest discomfort  Electrocardiogram was reviewed  There is a minor terminal intraventricular conduction defect  I will get a free T4 as well as a Holter counter  She may need a small dose of beta-blocker          Reason for Consult / Principal Problem:  Patient seen August 19, 2020  The initial problem is variation in pulse  She has palpitations occurring  Sounds like send his tachycardia  She has been evaluated the emergency room  She had negative troponin  Stress echo was done in July period was normal   She exercised for 9 minutes  No EKG changes  Echocardiogram was also normal   TSH is low normal   Routine blood work is all normal   Other symptoms include nausea, bilateral arm pain, constant chest pressure that is not related to exertion, headaches, jittery feeling, and  She has also seen a gastroenterologist   She does have a hiatal hernia  She is on Carafate  She had a CT scan of the abdomen and pelvis  That was normal  Chest x-ray is normal      Her medications include Carafate and red rice yeast   She has a past medical history of high cholesterol but her most recent LDL is 138 with a total cholesterol of 226 and HDL is 61  She is a   She exercises quite a bit  She has no chest pain with exercise  She has had her gallbladder removed  That was 10 years ago  She is   Does not drink alcohol or smoke    Her review of systems is positive for trouble sleeping, fatigue, nausea, heartburn, arthritis, back pain, difficulty concentrating, numbness and tingling and headaches  HPI: Vivian Caputo is a 61y o  year old female        Review of Systems   HENT: Negative  Respiratory: Positive for chest tightness  Cardiovascular: Positive for chest pain  Gastrointestinal: Positive for nausea  Genitourinary: Negative  Musculoskeletal: Positive for arthralgias  Skin: Negative  Neurological: Negative  Psychiatric/Behavioral: The patient is nervous/anxious            Past Medical History:   Diagnosis Date    Breast lump     last assessed 8/24/10    GERD (gastroesophageal reflux disease)     Hyperlipidemia     Infectious viral hepatitis     Hepatitis B    Myalgia     last assessed  1/29/15    Myositis     last assessed  1/29/15    Pernicious anemia      Past Surgical History:   Procedure Laterality Date    CHOLECYSTECTOMY      COLONOSCOPY      EGD      PA KNEE SCOPE,SINGLE MENISECTOMY Right 6/3/2019    Procedure: RIGHT KNEE ARTHROSCOPIC PARTIAL MEDIAL MENISCECTOMY; CHONDROPLASTY;  Surgeon: Breanna Olivo MD;  [facility];  Service: Orthopedics    TUBAL LIGATION      URETHRAL SLING       Social History     Substance and Sexual Activity   Alcohol Use Yes    Frequency: Monthly or less    Drinks per session: 1 or 2    Binge frequency: Never    Comment: socially     Social History     Substance and Sexual Activity   Drug Use No     Social History     Tobacco Use   Smoking Status Former Smoker   Smokeless Tobacco Never Used   Tobacco Comment    Quit at age 32     Family History   Problem Relation Age of Onset    Atrial fibrillation Mother    Maria Del Carmen Garcia Breast cancer Mother     Hypertension Mother     Hypertension Father     Aneurysm Sister         abdominal aortic    Cancer Maternal Grandmother        Allergies:  No Known Allergies    Medications:     Current Outpatient Medications:     ALPRAZolam (XANAX) 0 25 mg tablet, Take 1 tablet (0 25 mg total) by mouth 3 (three) times a day as needed for anxiety, Disp: 30 tablet, Rfl: 0    coenzyme Q-10 100 MG capsule, Take by mouth daily , Disp: , Rfl:     Omega-3 Fatty Acids (FISH OIL) 1,000 mg, Take 1,000 mg by mouth daily , Disp: , Rfl:     Red Yeast Rice 600 MG CAPS, Take 1 capsule (600 mg total) by mouth daily in the early morning, Disp: , Rfl: 0    doxycycline (ORACEA) 40 MG capsule, , Disp: , Rfl:     esomeprazole (NexIUM) 40 MG capsule, Take 1 capsule (40 mg total) by mouth daily, Disp: 90 capsule, Rfl: 1      Physical Exam  Constitutional:       General: She is not in acute distress  Appearance: She is obese  She is not ill-appearing, toxic-appearing or diaphoretic  Cardiovascular:      Rate and Rhythm: Normal rate and regular rhythm  Pulses: Normal pulses  Heart sounds: Normal heart sounds  No murmur  No friction rub  No gallop  Pulmonary:      Effort: Pulmonary effort is normal  No respiratory distress  Breath sounds: Normal breath sounds  No stridor  No wheezing or rhonchi  Chest:      Chest wall: No tenderness  Musculoskeletal:         General: No swelling, tenderness, deformity or signs of injury  Right lower leg: No edema  Left lower leg: No edema  Skin:     General: Skin is dry  Coloration: Skin is not jaundiced or pale  Findings: No bruising, erythema or rash  Neurological:      Mental Status: She is alert and oriented to person, place, and time     Psychiatric:         Mood and Affect: Mood normal            Laboratory Studies:  CMP:  Lab Results   Component Value Date    CREATININE 0 90 08/12/2020    CREATININE 0 73 10/28/2017    BUN 13 08/12/2020    BUN 16 10/16/2018     10/28/2017    K 3 5 08/12/2020    K 4 2 10/16/2018     (H) 08/12/2020     10/16/2018    CO2 26 08/12/2020    CO2 23 10/16/2018    GLUCOSE 103 (H) 10/28/2017    PROT 6 8 10/28/2017    ALKPHOS 96 07/11/2020    ALKPHOS 102 10/28/2017    ALT 36 07/11/2020    ALT 15 10/16/2018    AST 19 07/11/2020    AST 16 10/16/2018     NT-proBNP: No results found for: NTBNP   CBC:  Lab Results   Component Value Date    WBC 5 90 08/12/2020    WBC 0-5 03/06/2017    RBC 4 84 08/12/2020    HGB 14 4 08/12/2020    HCT 42 2 08/12/2020    MCV 87 08/12/2020    MCH 29 8 08/12/2020    RDW 13 1 08/12/2020     08/12/2020     Coags:    Lipid Profile:   Lab Results   Component Value Date    CHOL 215 (H) 10/28/2017     Lab Results   Component Value Date    HDL 61 07/11/2020     Lab Results   Component Value Date    LDLCALC 138 (H) 07/11/2020     Lab Results   Component Value Date    TRIG 134 07/11/2020       Cardiac testing:     EKG reviewed personally:  Emergency room EKG reviewed  His minor terminal intraventricular conduction defect  Otherwise it is normal    No results found for this or any previous visit  No results found for this or any previous visit  No results found for this or any previous visit  No results found for this or any previous visit  Cheri Hardin MD    Portions of the record may have been created with voice recognition software   Occasional wrong word or "sound a like" substitutions may have occurred due to the inherent limitations of voice recognition software   Read the chart carefully and recognize, using context, where substitutions have occurred

## 2020-08-26 LAB — T4 FREE SERPL-MCNC: 1.31 NG/DL (ref 0.82–1.77)

## 2020-08-27 ENCOUNTER — HOSPITAL ENCOUNTER (OUTPATIENT)
Dept: NON INVASIVE DIAGNOSTICS | Facility: HOSPITAL | Age: 60
Discharge: HOME | End: 2020-08-27
Payer: COMMERCIAL

## 2020-08-27 DIAGNOSIS — R00.2 PALPITATIONS: ICD-10-CM

## 2020-08-27 PROCEDURE — 93225 XTRNL ECG REC<48 HRS REC: CPT

## 2020-08-27 PROCEDURE — 93227 XTRNL ECG REC<48 HR R&I: CPT | Performed by: INTERNAL MEDICINE

## 2020-08-27 PROCEDURE — 93226 XTRNL ECG REC<48 HR SCAN A/R: CPT

## 2020-08-28 ENCOUNTER — TELEPHONE (OUTPATIENT)
Dept: CARDIOLOGY CLINIC | Facility: CLINIC | Age: 60
End: 2020-08-28

## 2020-08-29 ENCOUNTER — HOSPITAL ENCOUNTER (EMERGENCY)
Facility: HOSPITAL | Age: 60
Discharge: HOME | End: 2020-08-29
Attending: EMERGENCY MEDICINE | Admitting: EMERGENCY MEDICINE
Payer: COMMERCIAL

## 2020-08-29 ENCOUNTER — APPOINTMENT (EMERGENCY)
Dept: RADIOLOGY | Facility: HOSPITAL | Age: 60
End: 2020-08-29
Attending: EMERGENCY MEDICINE
Payer: COMMERCIAL

## 2020-08-29 VITALS
RESPIRATION RATE: 12 BRPM | HEART RATE: 58 BPM | SYSTOLIC BLOOD PRESSURE: 139 MMHG | OXYGEN SATURATION: 95 % | TEMPERATURE: 97.2 F | DIASTOLIC BLOOD PRESSURE: 92 MMHG

## 2020-08-29 DIAGNOSIS — R07.9 CHEST PAIN, UNSPECIFIED: Primary | ICD-10-CM

## 2020-08-29 DIAGNOSIS — R53.1 GENERALIZED WEAKNESS: ICD-10-CM

## 2020-08-29 LAB
ALBUMIN SERPL BCP-MCNC: 3.6 G/DL (ref 3.5–5)
ALP SERPL-CCNC: 99 U/L (ref 46–116)
ALT SERPL W P-5'-P-CCNC: 29 U/L (ref 12–78)
ANION GAP SERPL CALCULATED.3IONS-SCNC: 6 MMOL/L (ref 4–13)
APTT PPP: 28 SECONDS (ref 23–37)
AST SERPL W P-5'-P-CCNC: 20 U/L (ref 5–45)
ATRIAL RATE: 55 BPM
ATRIAL RATE: 58 BPM
BASOPHILS # BLD AUTO: 0.04 THOUSANDS/ΜL (ref 0–0.1)
BASOPHILS NFR BLD AUTO: 1 % (ref 0–1)
BILIRUB SERPL-MCNC: 0.5 MG/DL (ref 0.2–1)
BUN SERPL-MCNC: 14 MG/DL (ref 5–25)
CALCIUM SERPL-MCNC: 8.8 MG/DL (ref 8.3–10.1)
CHLORIDE SERPL-SCNC: 106 MMOL/L (ref 100–108)
CO2 SERPL-SCNC: 26 MMOL/L (ref 21–32)
CREAT SERPL-MCNC: 0.97 MG/DL (ref 0.6–1.3)
EOSINOPHIL # BLD AUTO: 0.1 THOUSAND/ΜL (ref 0–0.61)
EOSINOPHIL NFR BLD AUTO: 2 % (ref 0–6)
ERYTHROCYTE [DISTWIDTH] IN BLOOD BY AUTOMATED COUNT: 12.8 % (ref 11.6–15.1)
GFR SERPL CREATININE-BSD FRML MDRD: 64 ML/MIN/1.73SQ M
GLUCOSE SERPL-MCNC: 100 MG/DL (ref 65–140)
HCT VFR BLD AUTO: 42 % (ref 34.8–46.1)
HGB BLD-MCNC: 14.2 G/DL (ref 11.5–15.4)
IMM GRANULOCYTES # BLD AUTO: 0.01 THOUSAND/UL (ref 0–0.2)
IMM GRANULOCYTES NFR BLD AUTO: 0 % (ref 0–2)
INR PPP: 0.94 (ref 0.84–1.19)
LYMPHOCYTES # BLD AUTO: 1.58 THOUSANDS/ΜL (ref 0.6–4.47)
LYMPHOCYTES NFR BLD AUTO: 30 % (ref 14–44)
MCH RBC QN AUTO: 29.9 PG (ref 26.8–34.3)
MCHC RBC AUTO-ENTMCNC: 33.8 G/DL (ref 31.4–37.4)
MCV RBC AUTO: 88 FL (ref 82–98)
MONOCYTES # BLD AUTO: 0.4 THOUSAND/ΜL (ref 0.17–1.22)
MONOCYTES NFR BLD AUTO: 8 % (ref 4–12)
NEUTROPHILS # BLD AUTO: 3.11 THOUSANDS/ΜL (ref 1.85–7.62)
NEUTS SEG NFR BLD AUTO: 59 % (ref 43–75)
NRBC BLD AUTO-RTO: 0 /100 WBCS
P AXIS: 37 DEGREES
P AXIS: 41 DEGREES
PLATELET # BLD AUTO: 187 THOUSANDS/UL (ref 149–390)
PMV BLD AUTO: 12.1 FL (ref 8.9–12.7)
POTASSIUM SERPL-SCNC: 3.8 MMOL/L (ref 3.5–5.3)
PR INTERVAL: 188 MS
PR INTERVAL: 198 MS
PROT SERPL-MCNC: 6.7 G/DL (ref 6.4–8.2)
PROTHROMBIN TIME: 12.5 SECONDS (ref 11.6–14.5)
QRS AXIS: -18 DEGREES
QRS AXIS: -24 DEGREES
QRSD INTERVAL: 100 MS
QRSD INTERVAL: 102 MS
QT INTERVAL: 432 MS
QT INTERVAL: 442 MS
QTC INTERVAL: 422 MS
QTC INTERVAL: 424 MS
RBC # BLD AUTO: 4.75 MILLION/UL (ref 3.81–5.12)
SODIUM SERPL-SCNC: 138 MMOL/L (ref 136–145)
T WAVE AXIS: 27 DEGREES
T WAVE AXIS: 28 DEGREES
TROPONIN I SERPL-MCNC: <0.02 NG/ML
TROPONIN I SERPL-MCNC: <0.02 NG/ML
VENTRICULAR RATE: 55 BPM
VENTRICULAR RATE: 58 BPM
WBC # BLD AUTO: 5.24 THOUSAND/UL (ref 4.31–10.16)

## 2020-08-29 PROCEDURE — 85025 COMPLETE CBC W/AUTO DIFF WBC: CPT | Performed by: EMERGENCY MEDICINE

## 2020-08-29 PROCEDURE — 85610 PROTHROMBIN TIME: CPT | Performed by: EMERGENCY MEDICINE

## 2020-08-29 PROCEDURE — 99285 EMERGENCY DEPT VISIT HI MDM: CPT

## 2020-08-29 PROCEDURE — 85730 THROMBOPLASTIN TIME PARTIAL: CPT | Performed by: EMERGENCY MEDICINE

## 2020-08-29 PROCEDURE — 80053 COMPREHEN METABOLIC PANEL: CPT | Performed by: EMERGENCY MEDICINE

## 2020-08-29 PROCEDURE — 71045 X-RAY EXAM CHEST 1 VIEW: CPT

## 2020-08-29 PROCEDURE — 84484 ASSAY OF TROPONIN QUANT: CPT | Performed by: EMERGENCY MEDICINE

## 2020-08-29 PROCEDURE — 99285 EMERGENCY DEPT VISIT HI MDM: CPT | Performed by: EMERGENCY MEDICINE

## 2020-08-29 PROCEDURE — 93005 ELECTROCARDIOGRAM TRACING: CPT

## 2020-08-29 PROCEDURE — 36415 COLL VENOUS BLD VENIPUNCTURE: CPT | Performed by: EMERGENCY MEDICINE

## 2020-08-29 PROCEDURE — 93010 ELECTROCARDIOGRAM REPORT: CPT | Performed by: INTERNAL MEDICINE

## 2020-08-29 RX ORDER — ASPIRIN 81 MG/1
324 TABLET, CHEWABLE ORAL ONCE
Status: COMPLETED | OUTPATIENT
Start: 2020-08-29 | End: 2020-08-29

## 2020-08-29 RX ORDER — SUCRALFATE 1 G/1
1 TABLET ORAL 4 TIMES DAILY
COMMUNITY
End: 2021-04-19 | Stop reason: ALTCHOICE

## 2020-08-29 RX ADMIN — ASPIRIN 81 MG 324 MG: 81 TABLET ORAL at 02:18

## 2020-08-29 NOTE — ED PROVIDER NOTES
History  Chief Complaint   Patient presents with    Chest Pain     Chest pressure for 2mo  Pt has multiple c/o and states its been a long time since she has felt well  Patient is a 77-year-old female with a past medical history significant for chronic chest pain, hyperlipidemia, palpitations who presents with chest pain/pressure  Patient reports that she has had constant chest pressure for the last 2 months, central and left-sided, constant, nonradiating, moderate intensity, associated with occasional palpitations  Patient was evaluated by Cardiology, had a normal stress test approximately 1 month prior, just completed a 48 hour Holter monitoring, and is scheduled to follow-up with her cardiologist   Kera Peacock, patient reports that in addition to the chest pressure she developed generalized weakness and fatigue which scared her, she then took her blood pressure and her pulse and noticed that her pulse was 100 and her oxygen saturation was 94% which made her more nervous and therefore she presented to the emergency department  She denies any shortness of breath, lightheadedness, dizziness, fevers, chills, cough, congestion, rhinorrhea, lower extremity swelling or pain  Prior to Admission Medications   Prescriptions Last Dose Informant Patient Reported? Taking?    ALPRAZolam (XANAX) 0 25 mg tablet Past Month at Unknown time Self No Yes   Sig: Take 1 tablet (0 25 mg total) by mouth 3 (three) times a day as needed for anxiety   Omega-3 Fatty Acids (FISH OIL) 1,000 mg 8/29/2020 at Unknown time Self Yes Yes   Sig: Take 1,000 mg by mouth daily    Red Yeast Rice 600 MG CAPS 8/29/2020 at Unknown time Self No Yes   Sig: Take 1 capsule (600 mg total) by mouth daily in the early morning   coenzyme Q-10 100 MG capsule 8/28/2020 at Unknown time Self Yes Yes   Sig: Take by mouth daily    doxycycline (ORACEA) 40 MG capsule Not Taking at Unknown time Self Yes No   esomeprazole (NexIUM) 40 MG capsule 8/29/2020 at Unknown time Self No Yes   Sig: Take 1 capsule (40 mg total) by mouth daily   sucralfate (CARAFATE) 1 g tablet 8/29/2020 at Unknown time Self Yes Yes   Sig: Take 1 g by mouth 4 (four) times a day      Facility-Administered Medications: None       Past Medical History:   Diagnosis Date    Breast lump     last assessed 8/24/10    GERD (gastroesophageal reflux disease)     Hyperlipidemia     Infectious viral hepatitis     Hepatitis B    Myalgia     last assessed  1/29/15    Myositis     last assessed  1/29/15    Pernicious anemia        Past Surgical History:   Procedure Laterality Date    CHOLECYSTECTOMY      COLONOSCOPY      EGD      KS KNEE SCOPE,SINGLE MENISECTOMY Right 6/3/2019    Procedure: RIGHT KNEE ARTHROSCOPIC PARTIAL MEDIAL MENISCECTOMY; CHONDROPLASTY;  Surgeon: Kathie Mccabe MD;  Location[facility];  Service: Orthopedics    TUBAL LIGATION      URETHRAL SLING         Family History   Problem Relation Age of Onset    Atrial fibrillation Mother     Breast cancer Mother     Hypertension Mother     Hypertension Father     Aneurysm Sister         abdominal aortic    Cancer Maternal Grandmother      I have reviewed and agree with the history as documented  E-Cigarette/Vaping    E-Cigarette Use Never User      E-Cigarette/Vaping Substances    Nicotine No     THC No     CBD No     Flavoring No     Other No     Unknown No      Social History     Tobacco Use    Smoking status: Former Smoker    Smokeless tobacco: Never Used    Tobacco comment: Quit at age 32   Substance Use Topics    Alcohol use: Yes     Frequency: Monthly or less     Drinks per session: 1 or 2     Binge frequency: Never     Comment: socially    Drug use: No       Review of Systems   Constitutional: Negative for chills and fever  HENT: Negative for congestion and rhinorrhea  Eyes: Negative for photophobia and visual disturbance  Respiratory: Negative for chest tightness, shortness of breath and wheezing  Cardiovascular: Positive for chest pain  Negative for palpitations and leg swelling  Gastrointestinal: Negative for abdominal pain, diarrhea, nausea and vomiting  Genitourinary: Negative for dysuria and hematuria  Musculoskeletal: Negative for back pain and neck pain  Skin: Negative for pallor and rash  Neurological: Positive for weakness (generalized)  Negative for dizziness, light-headedness and headaches  Physical Exam  Physical Exam  Vitals signs and nursing note reviewed  Constitutional:       General: She is not in acute distress  Appearance: Normal appearance  She is well-developed and normal weight  She is not ill-appearing, toxic-appearing or diaphoretic  HENT:      Head: Normocephalic and atraumatic  Right Ear: External ear normal       Left Ear: External ear normal       Nose: Nose normal    Eyes:      Extraocular Movements: Extraocular movements intact  Conjunctiva/sclera: Conjunctivae normal       Pupils: Pupils are equal, round, and reactive to light  Neck:      Musculoskeletal: Normal range of motion and neck supple  Vascular: No JVD  Trachea: No tracheal deviation  Cardiovascular:      Rate and Rhythm: Normal rate and regular rhythm  Heart sounds: Normal heart sounds  No murmur  No friction rub  No gallop  Pulmonary:      Effort: Pulmonary effort is normal  No respiratory distress  Breath sounds: Normal breath sounds  No stridor  No wheezing, rhonchi or rales  Chest:      Chest wall: No tenderness  Abdominal:      General: Bowel sounds are normal  There is no distension  Palpations: Abdomen is soft  Tenderness: There is no abdominal tenderness  There is no guarding or rebound  Musculoskeletal: Normal range of motion  General: No swelling, tenderness, deformity or signs of injury  Right lower leg: No edema  Left lower leg: No edema  Skin:     General: Skin is warm and dry        Capillary Refill: Capillary refill takes less than 2 seconds  Coloration: Skin is not jaundiced or pale  Findings: No bruising, erythema, lesion or rash  Neurological:      General: No focal deficit present  Mental Status: She is alert and oriented to person, place, and time  GCS: GCS eye subscore is 4  GCS verbal subscore is 5  GCS motor subscore is 6  Cranial Nerves: No cranial nerve deficit  Sensory: Sensation is intact  No sensory deficit  Motor: Motor function is intact  No weakness  Coordination: Coordination is intact  Coordination normal       Gait: Gait normal       Deep Tendon Reflexes: Reflexes are normal and symmetric  Reflexes normal    Psychiatric:         Mood and Affect: Mood is anxious           Behavior: Behavior normal          Vital Signs  ED Triage Vitals [08/29/20 0142]   Temperature Pulse Respirations Blood Pressure SpO2   (!) 97 2 °F (36 2 °C) 67 18 157/99 98 %      Temp Source Heart Rate Source Patient Position - Orthostatic VS BP Location FiO2 (%)   Tympanic -- Lying Left arm --      Pain Score       --           Vitals:    08/29/20 0200 08/29/20 0215 08/29/20 0315 08/29/20 0545   BP: 130/90 136/84 140/94 139/92   Pulse: 56 (!) 54 56 58   Patient Position - Orthostatic VS:             Visual Acuity      ED Medications  Medications   aspirin chewable tablet 324 mg (324 mg Oral Given 8/29/20 0218)       Diagnostic Studies  Results Reviewed     Procedure Component Value Units Date/Time    Troponin I [[id number]]  (Normal) Collected:  08/29/20 0502    Lab Status:  Final result Specimen:  Blood from Arm, Left Updated:  08/29/20 0533     Troponin I <0 02 ng/mL     Troponin I [[id number]]  (Normal) Collected:  08/29/20 0157    Lab Status:  Final result Specimen:  Blood from Arm, Left Updated:  08/29/20 0223     Troponin I <0 02 ng/mL     Comprehensive metabolic panel [[id number]] Collected:  08/29/20 0157    Lab Status:  Final result Specimen:  Blood from Arm, Left Updated: 08/29/20 0218     Sodium 138 mmol/L      Potassium 3 8 mmol/L      Chloride 106 mmol/L      CO2 26 mmol/L      ANION GAP 6 mmol/L      BUN 14 mg/dL      Creatinine 0 97 mg/dL      Glucose 100 mg/dL      Calcium 8 8 mg/dL      AST 20 U/L      ALT 29 U/L      Alkaline Phosphatase 99 U/L      Total Protein 6 7 g/dL      Albumin 3 6 g/dL      Total Bilirubin 0 50 mg/dL      eGFR 64 ml/min/1 73sq m     Narrative:       [place] guidelines for Chronic Kidney Disease (CKD):     Stage 1 with normal or high GFR (GFR > 90 mL/min/1 73 square meters)    Stage 2 Mild CKD (GFR = 60-89 mL/min/1 73 square meters)    Stage 3A Moderate CKD (GFR = 45-59 mL/min/1 73 square meters)    Stage 3B Moderate CKD (GFR = 30-44 mL/min/1 73 square meters)    Stage 4 Severe CKD (GFR = 15-29 mL/min/1 73 square meters)    Stage 5 End Stage CKD (GFR <15 mL/min/1 73 square meters)  Note: GFR calculation is accurate only with a steady state creatinine    Protime-INR [[id number]]  (Normal) Collected:  08/29/20 0157    Lab Status:  Final result Specimen:  Blood from Arm, Left Updated:  08/29/20 0213     Protime 12 5 seconds      INR 0 94    APTT [[id number]]  (Normal) Collected:  08/29/20 0157    Lab Status:  Final result Specimen:  Blood from Arm, Left Updated:  08/29/20 0213     PTT 28 seconds     CBC and differential [[id number]] Collected:  08/29/20 0157    Lab Status:  Final result Specimen:  Blood from Arm, Left Updated:  08/29/20 0202     WBC 5 24 Thousand/uL      RBC 4 75 Million/uL      Hemoglobin 14 2 g/dL      Hematocrit 42 0 %      MCV 88 fL      MCH 29 9 pg      MCHC 33 8 g/dL      RDW 12 8 %      MPV 12 1 fL      Platelets 844 Thousands/uL      nRBC 0 /100 WBCs      Neutrophils Relative 59 %      Immat GRANS % 0 %      Lymphocytes Relative 30 %      Monocytes Relative 8 %      Eosinophils Relative 2 %      Basophils Relative 1 %      Neutrophils Absolute 3 11 Thousands/µL      Immature Grans Absolute 0 01 Thousand/uL      Lymphocytes Absolute 1 58 Thousands/µL      Monocytes Absolute 0 40 Thousand/µL      Eosinophils Absolute 0 10 Thousand/µL      Basophils Absolute 0 04 Thousands/µL                  XR chest 1 view portable   ED Interpretation by Danelle Hansen DO (08/29 0991)   No acute abnormalities as interpreted by me independently                 Procedures  ECG 12 Lead Documentation Only    Date/Time: 8/29/2020 5:36 AM  Performed by: Danelle Hansen DO  Authorized by: Danelle Hansen DO     Indications / Diagnosis:  Cp  Patient location:  ED  Interpretation:     Interpretation: normal    Rate:     ECG rate:  58    ECG rate assessment: normal    Rhythm:     Rhythm: sinus bradycardia    Ectopy:     Ectopy: none    QRS:     QRS axis:  Normal    QRS intervals:  Normal  Conduction:     Conduction: normal    ST segments:     ST segments:  Normal  T waves:     T waves: normal    Comments:      qtc [id number]  ECG 12 Lead Documentation Only    Date/Time: 8/29/2020 5:37 AM  Performed by: Danelle Hansen DO  Authorized by: Danelle Hansen DO     Indications / Diagnosis:  Repeat  ECG reviewed by me, the ED Provider: yes    Patient location:  ED  Previous ECG:     Previous ECG:  Compared to current    Comparison ECG info:  3 hours prior    Similarity:  No change  Interpretation:     Interpretation: normal    Rate:     ECG rate:  55    ECG rate assessment: normal    Rhythm:     Rhythm: sinus bradycardia    Ectopy:     Ectopy: none    QRS:     QRS axis:  Normal    QRS intervals:  Normal  Conduction:     Conduction: normal    ST segments:     ST segments:  Normal  T waves:     T waves: normal               ED Course  ED Course as of Aug 29 0904   Sat Aug 29, 2020   0542 Discussed strict return precautions with patient who verbalized understanding  She will be calling her cardiologist on Monday to review Holter monitor results and schedule follow-up appointment                  HEART Risk Score      Most Recent Value   Heart Score Risk Calculator   History  0 Filed at: 08/29/2020 0535   ECG  0 Filed at: 08/29/2020 0535   Age  1 Filed at: 08/29/2020 0535   Risk Factors  1 Filed at: 08/29/2020 0535   Troponin  0 Filed at: 08/29/2020 0535   HEART Score  2 Filed at: 08/29/2020 9669                          Wells' Criteria for PE      Most Recent Value   Wells' Criteria for PE   Clinical signs and symptoms of DVT  0 Filed at: 08/29/2020 5476   PE is primary diagnosis or equally likely  0 Filed at: 08/29/2020 0904   HR >100  0 Filed at: 08/29/2020 0904   Immobilization at least 3 days or Surgery in the previous 4 weeks  0 Filed at: 08/29/2020 7556   Previous, objectively diagnosed PE or DVT  0 Filed at: 08/29/2020 8294   Hemoptysis  0 Filed at: 08/29/2020 8830   Malignancy with treatment within 6 months or palliative  0 Filed at: 08/29/2020 2762   Wells' Criteria Total  0 Filed at: 08/29/2020 0904                  [facility]  Number of Diagnoses or Management Options  Chest pain, unspecified:   Generalized weakness:   Diagnosis management comments: Assessment and Plan:   Chest pain x 2 months today with worsening generalized weakness/fatigue  Cardiac workup  Heart score 2  Will obtain baseline and then 3 hour repeat  Discussed with patient to continue her scheduled follow-up with Cardiology, discussed strict return precautions which patient verbalized understanding of  Disposition  Final diagnoses:   Chest pain, unspecified   Generalized weakness     Time reflects when diagnosis was documented in both MDM as applicable and the Disposition within this note     Time User Action Codes Description Comment    8/29/2020  5:39 AM Yohana Washington Add [R07 9] Chest pain, unspecified     8/29/2020  5:39 AM Yohana Washington Add [R53 1] Generalized weakness       ED Disposition     ED Disposition Condition Date/Time Comment    Discharge Stable Sat Aug 29, 2020  5:39 AM Alfonzo Moore discharge to home/self care              Follow-up Information Follow up With Specialties Details Why Contact Info    Vish Castanon DO Family Medicine Schedule an appointment as soon as possible for a visit in 3 days for re-evaluation [address]      Jesika Camarillo MD Cardiology Schedule an appointment as soon as possible for a visit in 3 days for re-evaluation [address] [facility]  [address] [facility] [address]  [phone]            Discharge Medication List as of 8/29/2020  5:42 AM      CONTINUE these medications which have NOT CHANGED    Details   ALPRAZolam (XANAX) 0 25 mg tablet Take 1 tablet (0 25 mg total) by mouth 3 (three) times a day as needed for anxiety, Starting Fri 7/10/2020, Normal      coenzyme Q-10 100 MG capsule Take by mouth daily , Starting Thu 9/28/2017, Historical Med      esomeprazole (NexIUM) 40 MG capsule Take 1 capsule (40 mg total) by mouth daily, Starting Mon 4/27/2020, Normal      Omega-3 Fatty Acids (FISH OIL) 1,000 mg Take 1,000 mg by mouth daily , Starting Thu 9/28/2017, Historical Med      Red Yeast Rice 600 MG CAPS Take 1 capsule (600 mg total) by mouth daily in the early morning, Starting Tue 5/8/2018, No Print      sucralfate (CARAFATE) 1 g tablet Take 1 g by mouth 4 (four) times a day, Historical Med      doxycycline (ORACEA) 40 MG capsule Starting Tue 3/24/2020, Historical Med           No discharge procedures on file  PDMP Review     None          ED Provider  Electronically Signed by           Kimberly De La Paz DO  08/29/20 0904    ROS added on 9/1/2020 as initially not clicked off in ROS, but some positive and negative ROS were mentioned in the HPI         Kimberly De La Paz DO  09/01/20 8444

## 2020-08-30 ENCOUNTER — TELEPHONE (OUTPATIENT)
Dept: OTHER | Facility: OTHER | Age: 60
End: 2020-08-30

## 2020-08-30 NOTE — TELEPHONE ENCOUNTER
[phone] Jackie Moore/[birth year]/Having erratic blood pressure readings  Waiting for heart monitor results since Friday    5:34 PM

## 2020-08-31 ENCOUNTER — TELEPHONE (OUTPATIENT)
Dept: CARDIOLOGY CLINIC | Facility: CLINIC | Age: 60
End: 2020-08-31

## 2020-08-31 NOTE — TELEPHONE ENCOUNTER
I called patient with holter results  Although it was a normal holter, patient continues to be concerned with her symptoms  She will discuss at her 9/10 appt

## 2020-09-02 NOTE — TELEPHONE ENCOUNTER
I sent another message also the day I got this message when I was on call she is not a patient of mine,, please let Dr Tyra Magana know if this task has not already been addressed

## 2020-09-02 NOTE — TELEPHONE ENCOUNTER
I spoke with patient in detail about her monitor results, and her symptoms  She agreed to discuss with Dr Cristian Meadows next week

## 2020-09-10 ENCOUNTER — OFFICE VISIT (OUTPATIENT)
Dept: CARDIOLOGY CLINIC | Facility: CLINIC | Age: 60
End: 2020-09-10
Payer: COMMERCIAL

## 2020-09-10 VITALS
OXYGEN SATURATION: 98 % | DIASTOLIC BLOOD PRESSURE: 84 MMHG | HEIGHT: 64 IN | BODY MASS INDEX: 31.19 KG/M2 | TEMPERATURE: 96.9 F | SYSTOLIC BLOOD PRESSURE: 112 MMHG | HEART RATE: 62 BPM | WEIGHT: 182.7 LBS

## 2020-09-10 DIAGNOSIS — R07.89 SENSATION OF CHEST PRESSURE: ICD-10-CM

## 2020-09-10 DIAGNOSIS — R00.2 PALPITATIONS: Primary | ICD-10-CM

## 2020-09-10 PROCEDURE — 99213 OFFICE O/P EST LOW 20 MIN: CPT | Performed by: INTERNAL MEDICINE

## 2020-09-10 PROCEDURE — 1036F TOBACCO NON-USER: CPT | Performed by: INTERNAL MEDICINE

## 2020-09-10 NOTE — PROGRESS NOTES
Follow-up - Cardiology   Inna Moore 61 y o  female MRN: [id number]        Problems    Problem List Items Addressed This Visit     None      Visit Diagnoses     Palpitations    -  Primary    Sensation of chest pressure                Plan patient seen September 10, 2020  She had a history of palpitations  She had a 48 hour Holter  48 hour Holter totally normal   She had an echo stress done in July of 2020 which was normal   I tried to reassure her that her chest pains are very atypical I do not think her cardiac in origin  Certainly I do not think a catheterization is warranted  A 48 hour Holter counter did  the fact that she has a heart rate of approximately 50 then wakes up and has heart rate of 156  However clearly there is a notation in no fibrillation or flutter  Her further reassurance from probably going to put a Zio patch on her  Her thyroid functions have been normal          HPI: Andrea Ibarra is a 61y o  year old female       Reason for Consult / Principal Problem:  Patient seen August 19, 2020  The initial problem is variation in pulse  She has palpitations occurring  Sounds like send his tachycardia  She has been evaluated the emergency room  She had negative troponin  Stress echo was done in July period was normal   She exercised for 9 minutes  No EKG changes  Echocardiogram was also normal   TSH is low normal   Routine blood work is all normal   Other symptoms include nausea, bilateral arm pain, constant chest pressure that is not related to exertion, headaches, jittery feeling, and  She has also seen a gastroenterologist   She does have a hiatal hernia  She is on Carafate  She had a CT scan of the abdomen and pelvis  That was normal  Chest x-ray is normal        Her medications include Carafate and red rice yeast   She has a past medical history of high cholesterol but her most recent LDL is 138 with a total cholesterol of 226 and HDL is 61  She is a   She exercises quite a bit  She has no chest pain with exercise  She has had her gallbladder removed  That was 10 years ago  She is   Does not drink alcohol or smoke  Her review of systems is positive for trouble sleeping, fatigue, nausea, heartburn, arthritis, back pain, difficulty concentrating, numbness and tingling and headaches            Review of Systems   Cardiovascular: Positive for palpitations           Past Medical History:   Diagnosis Date    Breast lump     last assessed 8/24/10    GERD (gastroesophageal reflux disease)     Hyperlipidemia     Infectious viral hepatitis     Hepatitis B    Myalgia     last assessed  1/29/15    Myositis     last assessed  1/29/15    Pernicious anemia      Social History     Substance and Sexual Activity   Alcohol Use Yes    Frequency: Monthly or less    Drinks per session: 1 or 2    Binge frequency: Never    Comment: socially     Social History     Substance and Sexual Activity   Drug Use No     Social History     Tobacco Use   Smoking Status Former Smoker   Smokeless Tobacco Never Used   Tobacco Comment    Quit at age 32       Allergies:  No Known Allergies    Medications:     Current Outpatient Medications:     ALPRAZolam (XANAX) 0 25 mg tablet, Take 1 tablet (0 25 mg total) by mouth 3 (three) times a day as needed for anxiety, Disp: 30 tablet, Rfl: 0    coenzyme Q-10 100 MG capsule, Take by mouth daily , Disp: , Rfl:     doxycycline (ORACEA) 40 MG capsule, , Disp: , Rfl:     Omega-3 Fatty Acids (FISH OIL) 1,000 mg, Take 1,000 mg by mouth daily , Disp: , Rfl:     Red Yeast Rice 600 MG CAPS, Take 1 capsule (600 mg total) by mouth daily in the early morning, Disp: , Rfl: 0    sucralfate (CARAFATE) 1 g tablet, Take 1 g by mouth 4 (four) times a day, Disp: , Rfl:     esomeprazole (NexIUM) 40 MG capsule, Take 1 capsule (40 mg total) by mouth daily (Patient not taking: Reported on 9/10/2020), Disp: 90 capsule, Rfl: 1      Physical Exam  Cardiovascular:      Rate and Rhythm: Normal rate and regular rhythm  Pulses: Normal pulses  Heart sounds: Normal heart sounds  No murmur  No friction rub  No gallop  Laboratory Studies:  CMP:      Invalid input(s): ALBUMIN  NT-proBNP: No results found for: NTBNP   Coags:    Lipid Profile:   Lab Results   Component Value Date    CHOL 215 (H) 10/28/2017     Lab Results   Component Value Date    HDL 61 07/11/2020     Lab Results   Component Value Date    LDLCALC 138 (H) 07/11/2020     Lab Results   Component Value Date    TRIG 134 07/11/2020       Cardiac testing:     EKG reviewed personally:     No results found for this or any previous visit  No results found for this or any previous visit  No results found for this or any previous visit  No results found for this or any previous visit  Tobi Rangel MD    Portions of the record may have been created with voice recognition software   Occasional wrong word or "sound a like" substitutions may have occurred due to the inherent limitations of voice recognition software   Read the chart carefully and recognize, using context, where substitutions have occurred

## 2020-09-29 PROBLEM — G89.29 CHRONIC LOW BACK PAIN: Status: ACTIVE | Noted: 2017-02-27

## 2020-09-29 PROBLEM — M47.816 LUMBAR SPONDYLOSIS: Status: ACTIVE | Noted: 2019-07-24

## 2020-09-29 PROBLEM — J30.89 ALLERGIC RHINITIS DUE TO HOUSE DUST MITE: Status: ACTIVE | Noted: 2020-09-29

## 2020-10-08 ENCOUNTER — CLINICAL SUPPORT (OUTPATIENT)
Dept: CARDIOLOGY CLINIC | Facility: CLINIC | Age: 60
End: 2020-10-08
Payer: COMMERCIAL

## 2020-10-08 DIAGNOSIS — R00.2 PALPITATIONS: ICD-10-CM

## 2020-10-08 PROCEDURE — 0298T PR EXT ECG > 48HR TO 21 DAY REVIEW AND INTERPRETATN: CPT | Performed by: INTERNAL MEDICINE

## 2021-03-14 ENCOUNTER — IMMUNIZATIONS (OUTPATIENT)
Dept: FAMILY MEDICINE CLINIC | Facility: HOSPITAL | Age: 61
End: 2021-03-14

## 2021-03-14 DIAGNOSIS — Z23 ENCOUNTER FOR IMMUNIZATION: Primary | ICD-10-CM

## 2021-03-14 PROCEDURE — 91300 SARS-COV-2 / COVID-19 MRNA VACCINE (PFIZER-BIONTECH) 30 MCG: CPT

## 2021-03-14 PROCEDURE — 0001A SARS-COV-2 / COVID-19 MRNA VACCINE (PFIZER-BIONTECH) 30 MCG: CPT

## 2021-04-10 ENCOUNTER — IMMUNIZATIONS (OUTPATIENT)
Dept: FAMILY MEDICINE CLINIC | Facility: HOSPITAL | Age: 61
End: 2021-04-10

## 2021-04-10 DIAGNOSIS — Z23 ENCOUNTER FOR IMMUNIZATION: Primary | ICD-10-CM

## 2021-04-10 PROCEDURE — 0002A SARS-COV-2 / COVID-19 MRNA VACCINE (PFIZER-BIONTECH) 30 MCG: CPT

## 2021-04-10 PROCEDURE — 91300 SARS-COV-2 / COVID-19 MRNA VACCINE (PFIZER-BIONTECH) 30 MCG: CPT

## 2021-04-19 ENCOUNTER — OFFICE VISIT (OUTPATIENT)
Dept: FAMILY MEDICINE CLINIC | Facility: CLINIC | Age: 61
End: 2021-04-19
Payer: COMMERCIAL

## 2021-04-19 VITALS
TEMPERATURE: 98.5 F | HEART RATE: 87 BPM | OXYGEN SATURATION: 97 % | BODY MASS INDEX: 32.95 KG/M2 | WEIGHT: 193 LBS | SYSTOLIC BLOOD PRESSURE: 124 MMHG | RESPIRATION RATE: 16 BRPM | DIASTOLIC BLOOD PRESSURE: 92 MMHG | HEIGHT: 64 IN

## 2021-04-19 DIAGNOSIS — R03.0 ELEVATED BLOOD PRESSURE READING: ICD-10-CM

## 2021-04-19 DIAGNOSIS — R53.83 FATIGUE, UNSPECIFIED TYPE: Primary | ICD-10-CM

## 2021-04-19 DIAGNOSIS — R00.2 PALPITATIONS: ICD-10-CM

## 2021-04-19 PROCEDURE — 99213 OFFICE O/P EST LOW 20 MIN: CPT | Performed by: NURSE PRACTITIONER

## 2021-04-19 PROCEDURE — 1036F TOBACCO NON-USER: CPT | Performed by: NURSE PRACTITIONER

## 2021-04-19 PROCEDURE — 3008F BODY MASS INDEX DOCD: CPT | Performed by: NURSE PRACTITIONER

## 2021-04-19 RX ORDER — AMOXICILLIN 500 MG/1
CAPSULE ORAL
COMMUNITY
Start: 2021-01-21 | End: 2021-04-19 | Stop reason: ALTCHOICE

## 2021-04-19 NOTE — PROGRESS NOTES
Assessment/Plan:      Diagnoses and all orders for this visit:    Fatigue, unspecified type  -     Comprehensive metabolic panel; Future  -     CBC and differential; Future  -     TSH, 3rd generation with Free T4 reflex; Future    Patient reports increased fatigue for the past few weeks  Lab work ordered  Elevated blood pressure reading  -     Comprehensive metabolic panel; Future  -     CBC and differential; Future  -     TSH, 3rd generation with Free T4 reflex; Future  -     ECG 12 lead; Future    Patient reports that her BP has been 120s-140s/90s-100s at home  Patient reports that her BP has been fluctuating at home and increases with activity  /92 today  Lab work ordered  EKG ordered  Palpitations  -     ECG 12 lead; Future  Patient reports that her heart rate increased and she had palpitations over the weekend when she was biking  Denies chest pain or SOB  EKG ordered  Discussed referral to cardiology  Patient reports that she will follow-up with her 's cardiologist      Patient instructed to follow-up in 2 weeks with her PCP, Dr Bob Will or jose fontaine  Subjective:     Patient ID: Sheryle Gables is a 61 y o  female  Patient reports increased fatigue for the past few weeks  Patient reports that she checked her blood pressure at home over the weekend and it was fluctuating all over the place  Patient reports that it went up after activity  Patient reports that her BP has been between 120s-140s/90s-100s at home  Patient reports that her blood pressure was fluctuating last summer too  Denies any chest pain  Denies any SOB  Patient reports that her heart rate increased and she had palpitations over the weekend with biking  Patient reports that she woke up last night and she felt like her heart rate was high too but did not check her pulse at the time  Patient reports that her pulse has been 70s at home when she checks it   Denies any dizziness, nausea, vomiting, or vision changes  Patient is concerned about her heart and her blood pressure  Review of Systems   Constitutional: Negative for chills and fever  HENT: Negative for congestion, ear pain and sore throat  Respiratory: Negative for cough, chest tightness, shortness of breath and wheezing  Cardiovascular:        As noted in HPI  Gastrointestinal: Negative for diarrhea, nausea and vomiting  Skin: Negative for rash  Neurological: Negative for dizziness, seizures, syncope and light-headedness  Objective:     Physical Exam  Vitals signs reviewed  Constitutional:       General: She is not in acute distress  Appearance: She is obese  She is not diaphoretic  HENT:      Right Ear: External ear normal       Left Ear: External ear normal       Mouth/Throat:      Mouth: Mucous membranes are moist       Pharynx: Oropharynx is clear  No posterior oropharyngeal erythema  Eyes:      Conjunctiva/sclera: Conjunctivae normal       Pupils: Pupils are equal, round, and reactive to light  Cardiovascular:      Rate and Rhythm: Normal rate and regular rhythm  Pulses: Normal pulses  Heart sounds: Normal heart sounds  Comments: No edema noted  Pulmonary:      Effort: Pulmonary effort is normal  No respiratory distress  Breath sounds: Normal breath sounds  No wheezing  Musculoskeletal:      Comments: Gait wnl  Skin:     Findings: No rash  Neurological:      Mental Status: She is alert and oriented to person, place, and time     Psychiatric:         Mood and Affect: Mood normal

## 2021-05-03 LAB
ALBUMIN SERPL-MCNC: 4.4 G/DL (ref 3.8–4.9)
ALBUMIN/GLOB SERPL: 1.8 {RATIO} (ref 1.2–2.2)
ALP SERPL-CCNC: 105 IU/L (ref 39–117)
ALT SERPL-CCNC: 23 IU/L (ref 0–32)
AST SERPL-CCNC: 24 IU/L (ref 0–40)
BASOPHILS # BLD AUTO: 0 X10E3/UL (ref 0–0.2)
BASOPHILS NFR BLD AUTO: 1 %
BILIRUB SERPL-MCNC: 0.9 MG/DL (ref 0–1.2)
BUN SERPL-MCNC: 12 MG/DL (ref 8–27)
BUN/CREAT SERPL: 16 (ref 12–28)
CALCIUM SERPL-MCNC: 9.5 MG/DL (ref 8.7–10.3)
CHLORIDE SERPL-SCNC: 106 MMOL/L (ref 96–106)
CO2 SERPL-SCNC: 24 MMOL/L (ref 20–29)
CREAT SERPL-MCNC: 0.76 MG/DL (ref 0.57–1)
EOSINOPHIL # BLD AUTO: 0.1 X10E3/UL (ref 0–0.4)
EOSINOPHIL NFR BLD AUTO: 2 %
ERYTHROCYTE [DISTWIDTH] IN BLOOD BY AUTOMATED COUNT: 12.9 % (ref 11.7–15.4)
GLOBULIN SER-MCNC: 2.5 G/DL (ref 1.5–4.5)
GLUCOSE SERPL-MCNC: 90 MG/DL (ref 65–99)
HCT VFR BLD AUTO: 44.5 % (ref 34–46.6)
HGB BLD-MCNC: 14.8 G/DL (ref 11.1–15.9)
IMM GRANULOCYTES # BLD: 0 X10E3/UL (ref 0–0.1)
IMM GRANULOCYTES NFR BLD: 0 %
LYMPHOCYTES # BLD AUTO: 1.7 X10E3/UL (ref 0.7–3.1)
LYMPHOCYTES NFR BLD AUTO: 37 %
MCH RBC QN AUTO: 28.5 PG (ref 26.6–33)
MCHC RBC AUTO-ENTMCNC: 33.3 G/DL (ref 31.5–35.7)
MCV RBC AUTO: 86 FL (ref 79–97)
MONOCYTES # BLD AUTO: 0.3 X10E3/UL (ref 0.1–0.9)
MONOCYTES NFR BLD AUTO: 7 %
NEUTROPHILS # BLD AUTO: 2.5 X10E3/UL (ref 1.4–7)
NEUTROPHILS NFR BLD AUTO: 53 %
PLATELET # BLD AUTO: 149 X10E3/UL (ref 150–450)
POTASSIUM SERPL-SCNC: 4.3 MMOL/L (ref 3.5–5.2)
PROT SERPL-MCNC: 6.9 G/DL (ref 6–8.5)
RBC # BLD AUTO: 5.2 X10E6/UL (ref 3.77–5.28)
SL AMB EGFR AFRICAN AMERICAN: 99 ML/MIN/1.73
SL AMB EGFR NON AFRICAN AMERICAN: 86 ML/MIN/1.73
SODIUM SERPL-SCNC: 141 MMOL/L (ref 134–144)
TSH SERPL DL<=0.005 MIU/L-ACNC: 1.17 UIU/ML (ref 0.45–4.5)
WBC # BLD AUTO: 4.7 X10E3/UL (ref 3.4–10.8)

## 2021-05-04 ENCOUNTER — OFFICE VISIT (OUTPATIENT)
Dept: LAB | Facility: CLINIC | Age: 61
End: 2021-05-04
Payer: COMMERCIAL

## 2021-05-04 DIAGNOSIS — R03.0 ELEVATED BLOOD PRESSURE READING: ICD-10-CM

## 2021-05-04 DIAGNOSIS — R00.2 PALPITATIONS: ICD-10-CM

## 2021-05-04 LAB
ATRIAL RATE: 69 BPM
P AXIS: 59 DEGREES
PR INTERVAL: 190 MS
QRS AXIS: -9 DEGREES
QRSD INTERVAL: 96 MS
QT INTERVAL: 402 MS
QTC INTERVAL: 430 MS
T WAVE AXIS: 44 DEGREES
VENTRICULAR RATE: 69 BPM

## 2021-05-04 PROCEDURE — 93005 ELECTROCARDIOGRAM TRACING: CPT

## 2021-05-04 PROCEDURE — 93010 ELECTROCARDIOGRAM REPORT: CPT | Performed by: INTERNAL MEDICINE

## 2021-05-06 ENCOUNTER — OFFICE VISIT (OUTPATIENT)
Dept: FAMILY MEDICINE CLINIC | Facility: CLINIC | Age: 61
End: 2021-05-06
Payer: COMMERCIAL

## 2021-05-06 VITALS
WEIGHT: 192.4 LBS | DIASTOLIC BLOOD PRESSURE: 82 MMHG | BODY MASS INDEX: 32.85 KG/M2 | SYSTOLIC BLOOD PRESSURE: 122 MMHG | RESPIRATION RATE: 18 BRPM | HEIGHT: 64 IN | HEART RATE: 81 BPM | OXYGEN SATURATION: 96 %

## 2021-05-06 DIAGNOSIS — R03.0 ELEVATED BLOOD PRESSURE READING: ICD-10-CM

## 2021-05-06 DIAGNOSIS — R00.2 PALPITATIONS: ICD-10-CM

## 2021-05-06 DIAGNOSIS — K22.70 BARRETT'S ESOPHAGUS WITHOUT DYSPLASIA: Primary | ICD-10-CM

## 2021-05-06 PROCEDURE — 3008F BODY MASS INDEX DOCD: CPT | Performed by: FAMILY MEDICINE

## 2021-05-06 PROCEDURE — 3725F SCREEN DEPRESSION PERFORMED: CPT | Performed by: FAMILY MEDICINE

## 2021-05-06 PROCEDURE — 99214 OFFICE O/P EST MOD 30 MIN: CPT | Performed by: FAMILY MEDICINE

## 2021-05-06 PROCEDURE — 1036F TOBACCO NON-USER: CPT | Performed by: FAMILY MEDICINE

## 2021-05-07 PROBLEM — R20.2 PARESTHESIA OF BILATERAL LEGS: Status: RESOLVED | Noted: 2019-05-13 | Resolved: 2021-05-07

## 2021-05-07 PROBLEM — K22.70 BARRETT'S ESOPHAGUS WITHOUT DYSPLASIA: Status: ACTIVE | Noted: 2021-05-07

## 2021-05-07 NOTE — ASSESSMENT & PLAN NOTE
Normal EKG was performed in the office last month  Patient has normal cardiac examination here in the office  She did have extensive workup back in July with hospitalization, normal exercise stress test with the exception of hypertensive response    Explained to the patient that she has gained 12 lb and without doing any real physical activity a normal physiologic response to exercise would be elevation of blood pressure and heart rate

## 2021-05-07 NOTE — ASSESSMENT & PLAN NOTE
Patient does have history of Christianson's esophagus and chronic GERD  She was not taking proton pump inhibitor as previously prescribed which was Nexium 40 mg twice daily  Needs to follow-up with gastroenterologist   Explained to the patient that this may be some of her symptoms of chest discomfort are related to uncontrolled reflux    Instructed to take Nexium as prescribed

## 2021-05-07 NOTE — PROGRESS NOTES
Subjective:      Patient ID: Andrea Ibarra is a 61 y o  female  70-year-old female presents for follow-up regarding labs, episode of hypertension and tachycardia following exercise  Patient states that back on April 18th and April 19th she was bicycling and felt that her heart rate was very high  Got home and noted that her blood pressure was elevated 150/110 and while she was exercising her pulse rate was as high as 120 beats per minute  Pulse rate did return to normal but blood pressure was still elevated  Patient was seen by nurse practitioner here in the office  She was normotensive with no evidence of tachycardia  Had normal physical examination  She was sent for labs  Patient was insistent that she was not feeling well in did not know why her heart rate went so high when she was exercising and her blood pressure was elevated  She does have significant stress at work  Patient is not on antihypertensive medications  Patient does also complain of frequent episodes of GERD  She does have history of Christianson's esophagus that was diagnosed with biopsy  She is supposed to be taking Nexium 40 mg twice daily but is only taking it once daily  Patient states that she was taking over-the-counter Gaviscon which did help with her chest/upper abdominal discomfort  She has not followed up with gastroenterologist   She does have history of hiatal hernia        Past Medical History:   Diagnosis Date    Breast lump     last assessed 8/24/10    GERD (gastroesophageal reflux disease)     Hyperlipidemia     Infectious viral hepatitis     Hepatitis B    Myalgia     last assessed  1/29/15    Myositis     last assessed  1/29/15    Pernicious anemia        Family History   Problem Relation Age of Onset    Atrial fibrillation Mother    Vickey Hernandez Breast cancer Mother     Hypertension Mother     Hypertension Father     Aneurysm Sister         abdominal aortic    Cancer Maternal Grandmother        Past Surgical History:   Procedure Laterality Date    CHOLECYSTECTOMY      COLONOSCOPY      EGD      [place] KNEE SCOPE,SINGLE MENISECTOMY Right 6/3/2019    Procedure: RIGHT KNEE ARTHROSCOPIC PARTIAL MEDIAL MENISCECTOMY; CHONDROPLASTY;  Surgeon: Jose Elias Meadows MD;  [facility];  Service: Orthopedics    [address]          reports that she has quit smoking  She has never used smokeless tobacco  She reports current alcohol use  She reports that she does not use drugs  Current Outpatient Medications:     coenzyme Q-10 100 MG capsule, Take by mouth daily , Disp: , Rfl:     esomeprazole (NexIUM) 40 MG capsule, Take 1 capsule (40 mg total) by mouth daily, Disp: 90 capsule, Rfl: 1    Omega-3 Fatty Acids (FISH OIL) 1,000 mg, Take 1,000 mg by mouth daily , Disp: , Rfl:     Red Yeast Rice 600 MG CAPS, Take 1 capsule (600 mg total) by mouth daily in the early morning, Disp: , Rfl: 0    The following portions of the patient's history were reviewed and updated as appropriate: allergies, current medications, past family history, past medical history, past social history, past surgical history and problem list     Review of Systems   Constitutional: Positive for fatigue  HENT: Negative  Eyes: Negative  Respiratory: Negative  Negative for shortness of breath  Cardiovascular: Positive for palpitations  Negative for chest pain  Gastrointestinal: Negative  GERD   Endocrine: Negative  Genitourinary: Negative  Musculoskeletal: Negative  Skin: Negative  Allergic/Immunologic: Negative  Neurological: Negative  Hematological: Negative  Psychiatric/Behavioral: The patient is nervous/anxious  Objective:    /82   Pulse 81   Resp 18   Ht 5' 4" (1 626 m)   Wt 87 3 kg (192 lb 6 4 oz)   LMP 01/01/2016   SpO2 96%   BMI 33 03 kg/m²      Physical Exam  Vitals signs and nursing note reviewed  Constitutional:       General: She is not in acute distress  Appearance: She is well-developed  She is obese  She is not diaphoretic  HENT:      Head: Normocephalic and atraumatic  Right Ear: External ear normal       Left Ear: External ear normal       Nose: Nose normal    Eyes:      Conjunctiva/sclera: Conjunctivae normal       Pupils: Pupils are equal, round, and reactive to light  Neck:      Musculoskeletal: Normal range of motion and neck supple  Cardiovascular:      Rate and Rhythm: Normal rate and regular rhythm  Pulses: Normal pulses  Heart sounds: Normal heart sounds  No murmur  Pulmonary:      Effort: Pulmonary effort is normal       Breath sounds: Normal breath sounds  Abdominal:      General: Bowel sounds are normal       Palpations: Abdomen is soft  Musculoskeletal: Normal range of motion  Skin:     General: Skin is warm and dry  Findings: No rash  Neurological:      Mental Status: She is alert and oriented to person, place, and time  Deep Tendon Reflexes: Reflexes are normal and symmetric  Psychiatric:         Behavior: Behavior normal          Thought Content:  Thought content normal          Judgment: Judgment normal            Recent Results (from the past 1008 hour(s))   Julito Hobbs Default    Collection Time: 05/03/21  9:24 AM   Result Value Ref Range    White Blood Cell Count 4 7 3 4 - 10 8 x10E3/uL    Red Blood Cell Count 5 20 3 77 - 5 28 x10E6/uL    Hemoglobin 14 8 11 1 - 15 9 g/dL    HCT 44 5 34 0 - 46 6 %    MCV 86 79 - 97 fL    MCH 28 5 26 6 - 33 0 pg    MCHC 33 3 31 5 - 35 7 g/dL    RDW 12 9 11 7 - 15 4 %    Platelet Count 133 (L) 150 - 450 x10E3/uL    Neutrophils 53 Not Estab  %    Lymphocytes 37 Not Estab  %    Monocytes 7 Not Estab  %    Eosinophils 2 Not Estab  %    Basophils PCT 1 Not Estab  %    Neutrophils (Absolute) 2 5 1 4 - 7 0 x10E3/uL    Lymphocytes (Absolute) 1 7 0 7 - 3 1 x10E3/uL    Monocytes (Absolute) 0 3 0 1 - 0 9 x10E3/uL    Eosinophils (Absolute) 0 1 0 0 - 0 4 x10E3/uL    Basophils ABS 0 0 0 0 - 0 2 x10E3/uL    Immature Granulocytes 0 Not Estab  %    Immature Granulocytes (Absolute) 0 0 0 0 - 0 1 x10E3/uL   Comprehensive metabolic panel    Collection Time: 05/03/21  9:24 AM   Result Value Ref Range    Glucose, Random 90 65 - 99 mg/dL    BUN 12 8 - 27 mg/dL    Creatinine 0 76 0 57 - 1 00 mg/dL    eGFR Non  86 >59 mL/min/1 73    eGFR  99 >59 mL/min/1 73    SL AMB BUN/CREATININE RATIO 16 12 - 28    Sodium 141 134 - 144 mmol/L    Potassium 4 3 3 5 - 5 2 mmol/L    Chloride 106 96 - 106 mmol/L    CO2 24 20 - 29 mmol/L    CALCIUM 9 5 8 7 - 10 3 mg/dL    Protein, Total 6 9 6 0 - 8 5 g/dL    Albumin 4 4 3 8 - 4 9 g/dL    Globulin, Total 2 5 1 5 - 4 5 g/dL    Albumin/Globulin Ratio 1 8 1 2 - 2 2    TOTAL BILIRUBIN 0 9 0 0 - 1 2 mg/dL    Alk Phos Isoenzymes 105 39 - 117 IU/L    AST 24 0 - 40 IU/L    ALT 23 0 - 32 IU/L   TSH, 3rd generation with Free T4 reflex    Collection Time: 05/03/21  9:24 AM   Result Value Ref Range    TSH 1 170 0 450 - 4 500 uIU/mL   ECG 12 lead    Collection Time: 05/04/21  4:09 PM   Result Value Ref Range    Ventricular Rate 69 BPM    Atrial Rate 69 BPM    ME Interval 190 ms    QRSD Interval 96 ms    QT Interval 402 ms    QTC Interval 430 ms    P [place] 59 degrees    QRS Axis -9 degrees    T Wave Axis 44 degrees       Assessment/Plan:    Christianson's esophagus without dysplasia  Patient does have history of Christianson's esophagus and chronic GERD  She was not taking proton pump inhibitor as previously prescribed which was Nexium 40 mg twice daily  Needs to follow-up with gastroenterologist   Explained to the patient that this may be some of her symptoms of chest discomfort are related to uncontrolled reflux  Instructed to take Nexium as prescribed    Palpitations  Normal EKG was performed in the office last month  Patient has normal cardiac examination here in the office    She did have extensive workup back in July with hospitalization, normal exercise stress test with the exception of hypertensive response  Explained to the patient that she has gained 12 lb and without doing any real physical activity a normal physiologic response to exercise would be elevation of blood pressure and heart rate    Elevated blood pressure reading  Normotensive in the office  She had 1 day of elevated blood pressure readings especially following exercise and activity  Explained to the patient that blood pressure can be elevated related to stress, pain, physical activity          Problem List Items Addressed This Visit        Digestive    Christianson's esophagus without dysplasia - Primary     Patient does have history of Christianson's esophagus and chronic GERD  She was not taking proton pump inhibitor as previously prescribed which was Nexium 40 mg twice daily  Needs to follow-up with gastroenterologist   Explained to the patient that this may be some of her symptoms of chest discomfort are related to uncontrolled reflux  Instructed to take Nexium as prescribed            Other    Elevated blood pressure reading     Normotensive in the office  She had 1 day of elevated blood pressure readings especially following exercise and activity  Explained to the patient that blood pressure can be elevated related to stress, pain, physical activity         Palpitations     Normal EKG was performed in the office last month  Patient has normal cardiac examination here in the office  She did have extensive workup back in July with hospitalization, normal exercise stress test with the exception of hypertensive response    Explained to the patient that she has gained 12 lb and without doing any real physical activity a normal physiologic response to exercise would be elevation of blood pressure and heart rate

## 2021-05-07 NOTE — ASSESSMENT & PLAN NOTE
Normotensive in the office  She had 1 day of elevated blood pressure readings especially following exercise and activity    Explained to the patient that blood pressure can be elevated related to stress, pain, physical activity

## 2021-06-08 ENCOUNTER — TELEPHONE (OUTPATIENT)
Dept: FAMILY MEDICINE CLINIC | Facility: CLINIC | Age: 61
End: 2021-06-08

## 2021-06-09 DIAGNOSIS — Z12.31 ENCOUNTER FOR SCREENING MAMMOGRAM FOR MALIGNANT NEOPLASM OF BREAST: Primary | ICD-10-CM

## 2021-06-14 ENCOUNTER — OFFICE VISIT (OUTPATIENT)
Dept: URGENT CARE | Facility: CLINIC | Age: 61
End: 2021-06-14
Payer: COMMERCIAL

## 2021-06-14 VITALS
WEIGHT: 190 LBS | OXYGEN SATURATION: 98 % | DIASTOLIC BLOOD PRESSURE: 88 MMHG | HEART RATE: 97 BPM | SYSTOLIC BLOOD PRESSURE: 118 MMHG | BODY MASS INDEX: 32.61 KG/M2 | RESPIRATION RATE: 16 BRPM | TEMPERATURE: 98.6 F

## 2021-06-14 DIAGNOSIS — J02.9 SORE THROAT: ICD-10-CM

## 2021-06-14 DIAGNOSIS — J01.00 ACUTE NON-RECURRENT MAXILLARY SINUSITIS: Primary | ICD-10-CM

## 2021-06-14 LAB — S PYO AG THROAT QL: NEGATIVE

## 2021-06-14 PROCEDURE — 87070 CULTURE OTHR SPECIMN AEROBIC: CPT | Performed by: PHYSICIAN ASSISTANT

## 2021-06-14 PROCEDURE — 99213 OFFICE O/P EST LOW 20 MIN: CPT | Performed by: PHYSICIAN ASSISTANT

## 2021-06-14 PROCEDURE — 1036F TOBACCO NON-USER: CPT | Performed by: PHYSICIAN ASSISTANT

## 2021-06-14 PROCEDURE — 87880 STREP A ASSAY W/OPTIC: CPT | Performed by: PHYSICIAN ASSISTANT

## 2021-06-14 RX ORDER — AMOXICILLIN 500 MG/1
500 CAPSULE ORAL EVERY 12 HOURS SCHEDULED
Qty: 10 CAPSULE | Refills: 0 | Status: SHIPPED | OUTPATIENT
Start: 2021-06-14 | End: 2021-06-19

## 2021-06-14 NOTE — PATIENT INSTRUCTIONS
Rapid strep was negative today, will send a culture to the lab  There is sinus pressure and tenderness  Will send amoxicillin 500mg 2x a day for 5 days  Can take ibuprofen or tylenol or use Chloraseptic spray, available at any drug store for the pain  Take a daily allergy medicine such as Allegra, zyrtec or Claritin  Take over the counter cold medication such as dayquil and then nyquil to help you sleep  Return if symptoms last more than 10 days

## 2021-06-14 NOTE — PROGRESS NOTES
[facility]        NAME: Joe Damon is a 61 y o  female  : 1960    MRN: [id number]  DATE: 2021  TIME: 5:46 PM    Assessment and Plan   Acute non-recurrent maxillary sinusitis [J01 00]  1  Acute non-recurrent maxillary sinusitis  amoxicillin (AMOXIL) 500 mg capsule   2  Sore throat  POCT rapid strepA    Throat culture         Patient Instructions     Patient Instructions    Rapid strep was negative today, will send a culture to the lab  There is sinus pressure and tenderness  Will send amoxicillin 500mg 2x a day for 5 days  Can take ibuprofen or tylenol or use Chloraseptic spray, available at any drug store for the pain  Take a daily allergy medicine such as Allegra, zyrtec or Claritin  Take over the counter cold medication such as dayquil and then nyquil to help you sleep  Return if symptoms last more than 10 days  Follow up with PCP in 3-5 days  Proceed to  ER if symptoms worsen  Chief Complaint     Chief Complaint   Patient presents with    Cold Like Symptoms     pt presents with sinus pressure/pain, sore throat,nasal congestion, started last week          History of Present Illness       62 y/o female presents with sore throat, sinus pressure and fatigue x7 days  Pt notes when the symptoms first started she had facial pain almost into her teeth  She took an old amoxicillin 500mg prescription for about 4 days but then ran out  They facial pain and congestion improved while on the antibiotic but has returned since running out  Pt denies fevers, chills, body aches, cough, ear ache, SOB or CP  Review of Systems   Review of Systems   Constitutional: Negative for chills and fever  HENT: Positive for congestion, ear pain, sinus pressure, sinus pain and sore throat  Negative for facial swelling  Eyes: Positive for pain  Respiratory: Negative for cough and shortness of breath  Cardiovascular: Negative for chest pain and palpitations     Neurological: Negative for headaches  Current Medications       Current Outpatient Medications:     coenzyme Q-10 100 MG capsule, Take by mouth daily , Disp: , Rfl:     esomeprazole (NexIUM) 40 MG capsule, Take 1 capsule (40 mg total) by mouth daily, Disp: 90 capsule, Rfl: 1    Omega-3 Fatty Acids (FISH OIL) 1,000 mg, Take 1,000 mg by mouth daily , Disp: , Rfl:     Red Yeast Rice 600 MG CAPS, Take 1 capsule (600 mg total) by mouth daily in the early morning, Disp: , Rfl: 0    amoxicillin (AMOXIL) 500 mg capsule, Take 1 capsule (500 mg total) by mouth every 12 (twelve) hours for 5 days, Disp: 10 capsule, Rfl: 0    Current Allergies     Allergies as of 06/14/2021    (No Known Allergies)            The following portions of the patient's history were reviewed and updated as appropriate: allergies, current medications, past family history, past medical history, past social history, past surgical history and problem list      Past Medical History:   Diagnosis Date    Breast lump     last assessed 8/24/10    GERD (gastroesophageal reflux disease)     Hyperlipidemia     Infectious viral hepatitis     Hepatitis B    Myalgia     last assessed  1/29/15    Myositis     last assessed  1/29/15    Pernicious anemia        Past Surgical History:   Procedure Laterality Date    CHOLECYSTECTOMY      COLONOSCOPY      EGD      [place] KNEE SCOPE,SINGLE MENISECTOMY Right 6/3/2019    Procedure: RIGHT KNEE ARTHROSCOPIC PARTIAL MEDIAL MENISCECTOMY; CHONDROPLASTY;  Surgeon: Beranna Olivo MD;  [facility];  Service: Orthopedics    TUBAL LIGATION      URETHRAL SLING         Family History   Problem Relation Age of Onset    Atrial fibrillation Mother     Breast cancer Mother     Hypertension Mother     Hypertension Father     Aneurysm Sister         abdominal aortic    Cancer Maternal Grandmother          Medications have been verified          Objective   /88   Pulse 97   Temp 98 6 °F (37 °C)   Resp 16   Wt 86 2 kg (190 lb) LMP 01/01/2016   SpO2 98%   BMI 32 61 kg/m²        Physical Exam     Physical Exam  Vitals and nursing note reviewed  Constitutional:       Appearance: Normal appearance  HENT:      Head: Normocephalic and atraumatic  Right Ear: Tympanic membrane normal       Left Ear: Tympanic membrane normal       Nose:      Right Sinus: Maxillary sinus tenderness present  Left Sinus: Maxillary sinus tenderness present  Mouth/Throat:      Mouth: Mucous membranes are moist       Pharynx: Posterior oropharyngeal erythema present  Eyes:      Extraocular Movements: Extraocular movements intact  Pupils: Pupils are equal, round, and reactive to light  Cardiovascular:      Rate and Rhythm: Normal rate and regular rhythm  Pulses: Normal pulses  Heart sounds: Normal heart sounds  Pulmonary:      Effort: Pulmonary effort is normal       Breath sounds: Normal breath sounds  Neurological:      Mental Status: She is alert and oriented to person, place, and time

## 2021-06-16 ENCOUNTER — PREP FOR PROCEDURE (OUTPATIENT)
Dept: GASTROENTEROLOGY | Facility: CLINIC | Age: 61
End: 2021-06-16

## 2021-06-16 DIAGNOSIS — Z86.010 HISTORY OF COLON POLYPS: ICD-10-CM

## 2021-06-16 DIAGNOSIS — K22.70 BARRETT'S ESOPHAGUS WITHOUT DYSPLASIA: Primary | ICD-10-CM

## 2021-06-16 LAB — BACTERIA THROAT CULT: NORMAL

## 2021-06-22 DIAGNOSIS — K21.9 GERD WITHOUT ESOPHAGITIS: Primary | ICD-10-CM

## 2021-06-22 RX ORDER — ESOMEPRAZOLE MAGNESIUM 40 MG/1
CAPSULE, DELAYED RELEASE ORAL
Qty: 60 CAPSULE | Refills: 2 | Status: SHIPPED | OUTPATIENT
Start: 2021-06-22 | End: 2021-12-12 | Stop reason: SDUPTHER

## 2021-06-22 NOTE — TELEPHONE ENCOUNTER
Pt is requesting a refill of her Nexium  She states she has been taking it twice a day which was recommended by her pcp  She states she will schedule an office visit

## 2021-07-13 ENCOUNTER — HOSPITAL ENCOUNTER (OUTPATIENT)
Dept: RADIOLOGY | Facility: HOSPITAL | Age: 61
Discharge: HOME | End: 2021-07-13
Payer: COMMERCIAL

## 2021-07-13 VITALS — WEIGHT: 190 LBS | HEIGHT: 64 IN | BODY MASS INDEX: 32.44 KG/M2

## 2021-07-13 DIAGNOSIS — Z12.31 ENCOUNTER FOR SCREENING MAMMOGRAM FOR MALIGNANT NEOPLASM OF BREAST: ICD-10-CM

## 2021-07-13 PROCEDURE — 77067 SCR MAMMO BI INCL CAD: CPT

## 2021-07-13 PROCEDURE — 77063 BREAST TOMOSYNTHESIS BI: CPT

## 2021-07-14 ENCOUNTER — TELEPHONE (OUTPATIENT)
Dept: FAMILY MEDICINE CLINIC | Facility: CLINIC | Age: 61
End: 2021-07-14

## 2021-07-14 NOTE — TELEPHONE ENCOUNTER
----- Message from Virginia Felipe DO sent at 7/14/2021  9:03 AM EDT -----  Normal screening mammogram   Repeat study in 1 year

## 2021-07-29 ENCOUNTER — OFFICE VISIT (OUTPATIENT)
Dept: GASTROENTEROLOGY | Facility: CLINIC | Age: 61
End: 2021-07-29
Payer: COMMERCIAL

## 2021-07-29 VITALS
WEIGHT: 190 LBS | HEIGHT: 64 IN | BODY MASS INDEX: 32.44 KG/M2 | DIASTOLIC BLOOD PRESSURE: 94 MMHG | SYSTOLIC BLOOD PRESSURE: 129 MMHG | HEART RATE: 79 BPM

## 2021-07-29 DIAGNOSIS — Z86.010 HX OF ADENOMATOUS COLONIC POLYPS: ICD-10-CM

## 2021-07-29 DIAGNOSIS — K22.70 BARRETT'S ESOPHAGUS WITHOUT DYSPLASIA: ICD-10-CM

## 2021-07-29 DIAGNOSIS — K21.00 GASTROESOPHAGEAL REFLUX DISEASE WITH ESOPHAGITIS WITHOUT HEMORRHAGE: Primary | ICD-10-CM

## 2021-07-29 PROBLEM — Z86.0101 HX OF ADENOMATOUS COLONIC POLYPS: Status: ACTIVE | Noted: 2021-07-29

## 2021-07-29 PROCEDURE — 3008F BODY MASS INDEX DOCD: CPT | Performed by: INTERNAL MEDICINE

## 2021-07-29 PROCEDURE — 1036F TOBACCO NON-USER: CPT | Performed by: INTERNAL MEDICINE

## 2021-07-29 PROCEDURE — 99214 OFFICE O/P EST MOD 30 MIN: CPT | Performed by: INTERNAL MEDICINE

## 2021-07-29 NOTE — PROGRESS NOTES
[facility] - Outpatient Follow-up Note  Pratima Moore 61 y o  female MRN: [id number]  Encounter: [id number]          ASSESSMENT AND PLAN:      1  Gastroesophageal reflux disease with esophagitis without hemorrhage   intermittent episodes of exacerbation with some epigastric distress that the at times circles around the breast line  Her gallbladder is out  Usually last a couple days and resolves  2  Christianson's esophagus without dysplasia    We discussed this at length along with the clinical course of patients with Christianson's  Discussed RFA and TIF  Surveillance biopsies in September    3  Hx of adenomatous colonic polyps   history tubular adenomas surveillance colonoscopy this September  This will be done along with EGD  She will otherwise follow-up in 3 months 4 months    ______________________________________________________________________    SUBJECTIVE:   Very pleasant 80-year-old lady history reflux some Christianson's esophagus above the EG junction  She has intermittent episodes of flares  Where she takes esomeprazole 40 mg twice a day cage Stewartrandi Fournier uses Gaviscon  She sometimes has nocturnal symptoms when retiring  We discussed Gaviscon at night time  We also discussed natural history of Christianson's  As well as potential surgery such as diff  Colon cancer screening and Christianson's surveillance September of this year  REVIEW OF SYSTEMS IS OTHERWISE NEGATIVE        Historical Information   Past Medical History:   Diagnosis Date    Breast lump     last assessed 8/24/10    GERD (gastroesophageal reflux disease)     Hyperlipidemia     Infectious viral hepatitis     Hepatitis B    Myalgia     last assessed  1/29/15    Myositis     last assessed  1/29/15    Pernicious anemia      Past Surgical History:   Procedure Laterality Date    CHOLECYSTECTOMY      COLONOSCOPY      EGD      NH KNEE SCOPE,SINGLE MENISECTOMY Right 6/3/2019    Procedure: RIGHT KNEE ARTHROSCOPIC PARTIAL MEDIAL MENISCECTOMY; CHONDROPLASTY;  Surgeon: Myra Ng MD;  Location: [facility];  Service: Orthopedics    TUBAL LIGATION      URETHRAL SLING       Social History   Social History     Substance and Sexual Activity   Alcohol Use Yes    Comment: socially     Social History     Substance and Sexual Activity   Drug Use No     Social History     Tobacco Use   Smoking Status Former Smoker   Smokeless Tobacco Never Used   Tobacco Comment    Quit at age 32     Family History   Problem Relation Age of Onset    Atrial fibrillation Mother     Breast cancer Mother 61    Hypertension Mother     Hypertension Father     Aneurysm Sister         abdominal aortic    Cancer Maternal Grandmother     No Known Problems Sister     No Known Problems Daughter     Breast cancer Maternal Aunt 61    No Known Problems Maternal Aunt        Meds/Allergies       Current Outpatient Medications:     coenzyme Q-10 100 MG capsule    esomeprazole (NexIUM) 40 MG capsule    Omega-3 Fatty Acids (FISH OIL) 1,000 mg    Red Yeast Rice 600 MG CAPS    No Known Allergies        Objective     Blood pressure 129/94, pulse 79, height 5' 4" (1 626 m), weight 86 2 kg (190 lb), last menstrual period 01/01/2016  Body mass index is 32 61 kg/m²  PHYSICAL EXAM:      General Appearance:   Alert, cooperative, no distress   HEENT:   Normocephalic, atraumatic, anicteric      Neck:  Supple, symmetrical, trachea midline   Lungs:   Clear to auscultation bilaterally; no rales, rhonchi or wheezing; respirations unlabored    Heart[de-identified]   Regular rate and rhythm; no murmur, rub, or gallop     Abdomen:   Soft, non-tender, non-distended; normal bowel sounds; no masses, no organomegaly    Genitalia:   Deferred    Rectal:   Deferred    Extremities:  No cyanosis, clubbing or edema    Pulses:  2+ and symmetric    Skin:  No jaundice, rashes, or lesions    Lymph nodes:  No palpable cervical lymphadenopathy        Lab Results:   No visits with results within 1 Day(s) from this visit  Latest known visit with results is:   Office Visit on 06/14/2021   Component Date Value     RAPID STREP A 06/14/2021 Negative     Throat Culture 06/14/2021 Negative for beta-hemolytic Streptococcus          Radiology Results:   Mammo screening bilateral w 3d & cad    Result Date: 7/14/2021  Narrative: DIAGNOSIS: Encounter for screening mammogram for malignant neoplasm of breast TECHNIQUE: Digital screening mammography was performed  Computer Aided Detection (CAD) analyzed all applicable images  COMPARISONS: Prior breast imaging dated: 09/10/2019 RELEVANT HISTORY: Family Breast Cancer History: History of breast cancer in Mother, Maternal Aunt  Family Medical History: Family medical history includes breast cancer in 2 relatives (maternal aunt, mother)  Personal History: No known relevant hormone history  No known relevant surgical history  No known relevant medical history  The patient is scheduled in a reminder system for screening mammography  8-10% of cancers will be missed on mammography  Management of a palpable abnormality must be based on clinical grounds  Patients will be notified of their results via letter from our facility  Accredited by [employer] of Radiology and FDA  RISK ASSESSMENT: 5 Year Tyrer-Cuzick: 2 66 % 10 Year Tyrer-Cuzick: 5 53 % Lifetime Tyrer-Cuzick: 13 58 % TISSUE DENSITY: The breasts are almost entirely fatty  INDICATION: Tiffanie Peterson is a 61 y o  female presenting for screening mammography  FINDINGS: There are no suspicious masses, grouped microcalcifications or areas of architectural distortion  The skin and nipple areolar complex are unremarkable  Impression: No mammographic evidence of malignancy  ASSESSMENT/BI-RADS CATEGORY: Left: 1 - Negative Right: 1 - Negative Overall: 1 - Negative RECOMMENDATION:      - Routine screening mammogram in 1 year for both breasts   Workstation ID: [id number]

## 2021-09-22 ENCOUNTER — TELEPHONE (OUTPATIENT)
Dept: PREADMISSION TESTING | Facility: HOSPITAL | Age: 61
End: 2021-09-22

## 2021-09-24 VITALS — WEIGHT: 190 LBS | BODY MASS INDEX: 32.44 KG/M2 | HEIGHT: 64 IN

## 2021-09-27 ENCOUNTER — ANESTHESIA EVENT (OUTPATIENT)
Dept: GASTROENTEROLOGY | Facility: AMBULARY SURGERY CENTER | Age: 61
End: 2021-09-27

## 2021-09-27 ENCOUNTER — HOSPITAL ENCOUNTER (OUTPATIENT)
Dept: GASTROENTEROLOGY | Facility: AMBULARY SURGERY CENTER | Age: 61
Setting detail: OUTPATIENT SURGERY
Discharge: HOME | End: 2021-09-27
Attending: INTERNAL MEDICINE
Payer: COMMERCIAL

## 2021-09-27 ENCOUNTER — ANESTHESIA (OUTPATIENT)
Dept: GASTROENTEROLOGY | Facility: AMBULARY SURGERY CENTER | Age: 61
End: 2021-09-27

## 2021-09-27 VITALS
DIASTOLIC BLOOD PRESSURE: 83 MMHG | HEART RATE: 75 BPM | TEMPERATURE: 97.7 F | RESPIRATION RATE: 18 BRPM | OXYGEN SATURATION: 100 % | SYSTOLIC BLOOD PRESSURE: 117 MMHG

## 2021-09-27 DIAGNOSIS — Z86.010 HISTORY OF COLON POLYPS: ICD-10-CM

## 2021-09-27 DIAGNOSIS — K22.70 BARRETT'S ESOPHAGUS WITHOUT DYSPLASIA: ICD-10-CM

## 2021-09-27 PROCEDURE — 88305 TISSUE EXAM BY PATHOLOGIST: CPT | Performed by: PATHOLOGY

## 2021-09-27 PROCEDURE — 45380 COLONOSCOPY AND BIOPSY: CPT | Performed by: INTERNAL MEDICINE

## 2021-09-27 PROCEDURE — 43239 EGD BIOPSY SINGLE/MULTIPLE: CPT | Performed by: INTERNAL MEDICINE

## 2021-09-27 RX ORDER — PROPOFOL 10 MG/ML
INJECTION, EMULSION INTRAVENOUS CONTINUOUS PRN
Status: DISCONTINUED | OUTPATIENT
Start: 2021-09-27 | End: 2021-09-27

## 2021-09-27 RX ORDER — SODIUM CHLORIDE, SODIUM LACTATE, POTASSIUM CHLORIDE, CALCIUM CHLORIDE 600; 310; 30; 20 MG/100ML; MG/100ML; MG/100ML; MG/100ML
125 INJECTION, SOLUTION INTRAVENOUS CONTINUOUS
Status: DISCONTINUED | OUTPATIENT
Start: 2021-09-27 | End: 2021-10-01 | Stop reason: HOSPADM

## 2021-09-27 RX ORDER — ONDANSETRON 2 MG/ML
4 INJECTION INTRAMUSCULAR; INTRAVENOUS ONCE AS NEEDED
Status: CANCELLED | OUTPATIENT
Start: 2021-09-27

## 2021-09-27 RX ORDER — LIDOCAINE HYDROCHLORIDE 20 MG/ML
INJECTION, SOLUTION EPIDURAL; INFILTRATION; INTRACAUDAL; PERINEURAL AS NEEDED
Status: DISCONTINUED | OUTPATIENT
Start: 2021-09-27 | End: 2021-09-27

## 2021-09-27 RX ORDER — PROPOFOL 10 MG/ML
INJECTION, EMULSION INTRAVENOUS AS NEEDED
Status: DISCONTINUED | OUTPATIENT
Start: 2021-09-27 | End: 2021-09-27

## 2021-09-27 RX ADMIN — PROPOFOL 50 MG: 10 INJECTION, EMULSION INTRAVENOUS at 08:37

## 2021-09-27 RX ADMIN — LIDOCAINE HYDROCHLORIDE 80 MG: 20 INJECTION, SOLUTION EPIDURAL; INFILTRATION; INTRACAUDAL; PERINEURAL at 08:36

## 2021-09-27 RX ADMIN — PROPOFOL 50 MG: 10 INJECTION, EMULSION INTRAVENOUS at 08:40

## 2021-09-27 RX ADMIN — PROPOFOL 140 MCG/KG/MIN: 10 INJECTION, EMULSION INTRAVENOUS at 08:46

## 2021-09-27 RX ADMIN — SODIUM CHLORIDE, SODIUM LACTATE, POTASSIUM CHLORIDE, AND CALCIUM CHLORIDE: .6; .31; .03; .02 INJECTION, SOLUTION INTRAVENOUS at 08:35

## 2021-09-27 RX ADMIN — PROPOFOL 50 MG: 10 INJECTION, EMULSION INTRAVENOUS at 08:43

## 2021-09-27 RX ADMIN — PROPOFOL 150 MG: 10 INJECTION, EMULSION INTRAVENOUS at 08:36

## 2021-09-27 RX ADMIN — PROPOFOL 30 MG: 10 INJECTION, EMULSION INTRAVENOUS at 08:46

## 2021-09-27 NOTE — ANESTHESIA PREPROCEDURE EVALUATION
Procedure:  COLONOSCOPY  EGD    Relevant Problems   ANESTHESIA (within normal limits)      CARDIO   (+) Hyperlipidemia      GI/HEPATIC   (+) Esophageal reflux   (+) Hernia, hiatal      MUSCULOSKELETAL   (+) Chronic low back pain   (+) Lumbar spondylosis   (+) Primary osteoarthritis of right knee      NEURO/PSYCH   (+) Anxiety disorder due to known physiological condition   (+) Hx of adenomatous colonic polyps      PULMONARY (within normal limits)        Physical Exam    Airway    Mallampati score: II  TM Distance: >3 FB  Neck ROM: full     Dental   No notable dental hx     Cardiovascular  Rhythm: regular, Rate: normal,     Pulmonary  Breath sounds clear to auscultation,     Other Findings        Anesthesia Plan  ASA Score- 2     Anesthesia Type- IV sedation with anesthesia with ASA Monitors  Additional Monitors:   Airway Plan:           Plan Factors-Exercise tolerance (METS): >4 METS  Chart reviewed  EKG reviewed  Existing labs reviewed  Patient summary reviewed  Patient is not a current smoker  Induction-     Postoperative Plan-     Informed Consent- Anesthetic plan and risks discussed with patient  I personally reviewed this patient with the CRNA  Discussed and agreed on the Anesthesia Plan with the CRNA  Roberto Méndez

## 2021-10-04 ENCOUNTER — TELEPHONE (OUTPATIENT)
Dept: GASTROENTEROLOGY | Facility: CLINIC | Age: 61
End: 2021-10-04

## 2021-11-18 ENCOUNTER — OFFICE VISIT (OUTPATIENT)
Dept: URGENT CARE | Facility: CLINIC | Age: 61
End: 2021-11-18
Payer: COMMERCIAL

## 2021-11-18 VITALS
HEART RATE: 75 BPM | DIASTOLIC BLOOD PRESSURE: 100 MMHG | BODY MASS INDEX: 34.31 KG/M2 | TEMPERATURE: 97.5 F | OXYGEN SATURATION: 97 % | HEIGHT: 64 IN | SYSTOLIC BLOOD PRESSURE: 150 MMHG | RESPIRATION RATE: 18 BRPM | WEIGHT: 201 LBS

## 2021-11-18 DIAGNOSIS — J01.10 ACUTE FRONTAL SINUSITIS, RECURRENCE NOT SPECIFIED: Primary | ICD-10-CM

## 2021-11-18 PROCEDURE — 99214 OFFICE O/P EST MOD 30 MIN: CPT | Performed by: PHYSICIAN ASSISTANT

## 2021-11-18 RX ORDER — AMOXICILLIN AND CLAVULANATE POTASSIUM 875; 125 MG/1; MG/1
1 TABLET, FILM COATED ORAL EVERY 12 HOURS SCHEDULED
Qty: 14 TABLET | Refills: 0 | Status: SHIPPED | OUTPATIENT
Start: 2021-11-18 | End: 2021-11-25

## 2021-11-19 ENCOUNTER — OFFICE VISIT (OUTPATIENT)
Dept: FAMILY MEDICINE CLINIC | Facility: CLINIC | Age: 61
End: 2021-11-19
Payer: COMMERCIAL

## 2021-11-19 VITALS
SYSTOLIC BLOOD PRESSURE: 142 MMHG | BODY MASS INDEX: 34.31 KG/M2 | OXYGEN SATURATION: 98 % | DIASTOLIC BLOOD PRESSURE: 86 MMHG | HEART RATE: 84 BPM | HEIGHT: 64 IN | WEIGHT: 201 LBS | RESPIRATION RATE: 16 BRPM

## 2021-11-19 DIAGNOSIS — F06.4 ANXIETY DISORDER DUE TO KNOWN PHYSIOLOGICAL CONDITION: ICD-10-CM

## 2021-11-19 DIAGNOSIS — R03.0 ELEVATED BLOOD PRESSURE READING: ICD-10-CM

## 2021-11-19 DIAGNOSIS — J30.89 ALLERGIC RHINITIS DUE TO HOUSE DUST MITE: Primary | ICD-10-CM

## 2021-11-19 PROCEDURE — 3008F BODY MASS INDEX DOCD: CPT | Performed by: FAMILY MEDICINE

## 2021-11-19 PROCEDURE — 1036F TOBACCO NON-USER: CPT | Performed by: FAMILY MEDICINE

## 2021-11-19 PROCEDURE — 99214 OFFICE O/P EST MOD 30 MIN: CPT | Performed by: FAMILY MEDICINE

## 2021-11-19 PROCEDURE — 3725F SCREEN DEPRESSION PERFORMED: CPT | Performed by: FAMILY MEDICINE

## 2021-11-19 RX ORDER — MONTELUKAST SODIUM 10 MG/1
10 TABLET ORAL
Qty: 30 TABLET | Refills: 1 | Status: SHIPPED | OUTPATIENT
Start: 2021-11-19 | End: 2021-12-13

## 2021-11-21 ENCOUNTER — APPOINTMENT (EMERGENCY)
Dept: RADIOLOGY | Facility: HOSPITAL | Age: 61
End: 2021-11-21
Payer: COMMERCIAL

## 2021-11-21 ENCOUNTER — HOSPITAL ENCOUNTER (EMERGENCY)
Facility: HOSPITAL | Age: 61
Discharge: HOME | End: 2021-11-21
Attending: EMERGENCY MEDICINE | Admitting: EMERGENCY MEDICINE
Payer: COMMERCIAL

## 2021-11-21 VITALS
TEMPERATURE: 97.8 F | RESPIRATION RATE: 22 BRPM | HEART RATE: 68 BPM | SYSTOLIC BLOOD PRESSURE: 132 MMHG | OXYGEN SATURATION: 97 % | DIASTOLIC BLOOD PRESSURE: 92 MMHG

## 2021-11-21 DIAGNOSIS — R51.9 ACUTE NONINTRACTABLE HEADACHE, UNSPECIFIED HEADACHE TYPE: ICD-10-CM

## 2021-11-21 DIAGNOSIS — R03.0 ELEVATED BLOOD PRESSURE READING: Primary | ICD-10-CM

## 2021-11-21 DIAGNOSIS — R07.89 CHEST PRESSURE: ICD-10-CM

## 2021-11-21 LAB
ALBUMIN SERPL BCP-MCNC: 3.5 G/DL (ref 3.5–5)
ALP SERPL-CCNC: 106 U/L (ref 46–116)
ALT SERPL W P-5'-P-CCNC: 38 U/L (ref 12–78)
ANION GAP SERPL CALCULATED.3IONS-SCNC: 4 MMOL/L (ref 4–13)
AST SERPL W P-5'-P-CCNC: 22 U/L (ref 5–45)
BASOPHILS # BLD AUTO: 0.03 THOUSANDS/ΜL (ref 0–0.1)
BASOPHILS NFR BLD AUTO: 0 % (ref 0–1)
BILIRUB SERPL-MCNC: 0.48 MG/DL (ref 0.2–1)
BILIRUB UR QL STRIP: NEGATIVE
BUN SERPL-MCNC: 17 MG/DL (ref 5–25)
CALCIUM SERPL-MCNC: 8.7 MG/DL (ref 8.3–10.1)
CARDIAC TROPONIN I PNL SERPL HS: <2 NG/L
CARDIAC TROPONIN I PNL SERPL HS: <2 NG/L
CHLORIDE SERPL-SCNC: 109 MMOL/L (ref 100–108)
CLARITY UR: CLEAR
CO2 SERPL-SCNC: 27 MMOL/L (ref 21–32)
COLOR UR: YELLOW
CREAT SERPL-MCNC: 0.99 MG/DL (ref 0.6–1.3)
EOSINOPHIL # BLD AUTO: 0.06 THOUSAND/ΜL (ref 0–0.61)
EOSINOPHIL NFR BLD AUTO: 1 % (ref 0–6)
ERYTHROCYTE [DISTWIDTH] IN BLOOD BY AUTOMATED COUNT: 13 % (ref 11.6–15.1)
GFR SERPL CREATININE-BSD FRML MDRD: 62 ML/MIN/1.73SQ M
GLUCOSE SERPL-MCNC: 98 MG/DL (ref 65–140)
GLUCOSE UR STRIP-MCNC: NEGATIVE MG/DL
HCT VFR BLD AUTO: 43.1 % (ref 34.8–46.1)
HGB BLD-MCNC: 14.3 G/DL (ref 11.5–15.4)
HGB UR QL STRIP.AUTO: NEGATIVE
IMM GRANULOCYTES # BLD AUTO: 0.02 THOUSAND/UL (ref 0–0.2)
IMM GRANULOCYTES NFR BLD AUTO: 0 % (ref 0–2)
KETONES UR STRIP-MCNC: NEGATIVE MG/DL
LEUKOCYTE ESTERASE UR QL STRIP: NEGATIVE
LYMPHOCYTES # BLD AUTO: 1.65 THOUSANDS/ΜL (ref 0.6–4.47)
LYMPHOCYTES NFR BLD AUTO: 22 % (ref 14–44)
MCH RBC QN AUTO: 29.1 PG (ref 26.8–34.3)
MCHC RBC AUTO-ENTMCNC: 33.2 G/DL (ref 31.4–37.4)
MCV RBC AUTO: 88 FL (ref 82–98)
MONOCYTES # BLD AUTO: 0.43 THOUSAND/ΜL (ref 0.17–1.22)
MONOCYTES NFR BLD AUTO: 6 % (ref 4–12)
NEUTROPHILS # BLD AUTO: 5.4 THOUSANDS/ΜL (ref 1.85–7.62)
NEUTS SEG NFR BLD AUTO: 71 % (ref 43–75)
NITRITE UR QL STRIP: NEGATIVE
NRBC BLD AUTO-RTO: 0 /100 WBCS
PH UR STRIP.AUTO: 7 [PH] (ref 4.5–8)
PLATELET # BLD AUTO: 198 THOUSANDS/UL (ref 149–390)
PMV BLD AUTO: 11.6 FL (ref 8.9–12.7)
POTASSIUM SERPL-SCNC: 3.9 MMOL/L (ref 3.5–5.3)
PROT SERPL-MCNC: 7.2 G/DL (ref 6.4–8.2)
PROT UR STRIP-MCNC: NEGATIVE MG/DL
RBC # BLD AUTO: 4.91 MILLION/UL (ref 3.81–5.12)
SODIUM SERPL-SCNC: 140 MMOL/L (ref 136–145)
SP GR UR STRIP.AUTO: 1.02 (ref 1–1.03)
UROBILINOGEN UR QL STRIP.AUTO: 0.2 E.U./DL
WBC # BLD AUTO: 7.59 THOUSAND/UL (ref 4.31–10.16)

## 2021-11-21 PROCEDURE — 81003 URINALYSIS AUTO W/O SCOPE: CPT

## 2021-11-21 PROCEDURE — 99285 EMERGENCY DEPT VISIT HI MDM: CPT | Performed by: EMERGENCY MEDICINE

## 2021-11-21 PROCEDURE — 96374 THER/PROPH/DIAG INJ IV PUSH: CPT

## 2021-11-21 PROCEDURE — 99284 EMERGENCY DEPT VISIT MOD MDM: CPT

## 2021-11-21 PROCEDURE — 80053 COMPREHEN METABOLIC PANEL: CPT | Performed by: EMERGENCY MEDICINE

## 2021-11-21 PROCEDURE — 85025 COMPLETE CBC W/AUTO DIFF WBC: CPT | Performed by: EMERGENCY MEDICINE

## 2021-11-21 PROCEDURE — 84484 ASSAY OF TROPONIN QUANT: CPT | Performed by: EMERGENCY MEDICINE

## 2021-11-21 PROCEDURE — 71046 X-RAY EXAM CHEST 2 VIEWS: CPT

## 2021-11-21 PROCEDURE — 36415 COLL VENOUS BLD VENIPUNCTURE: CPT | Performed by: EMERGENCY MEDICINE

## 2021-11-21 RX ORDER — HYDROCHLOROTHIAZIDE 12.5 MG/1
12.5 TABLET ORAL DAILY
Status: DISCONTINUED | OUTPATIENT
Start: 2021-11-22 | End: 2021-11-22 | Stop reason: HOSPADM

## 2021-11-21 RX ORDER — HYDROCHLOROTHIAZIDE 25 MG/1
12.5 TABLET ORAL DAILY
Qty: 3 TABLET | Refills: 0 | Status: SHIPPED | OUTPATIENT
Start: 2021-11-21 | End: 2021-11-22

## 2021-11-21 RX ORDER — KETOROLAC TROMETHAMINE 30 MG/ML
15 INJECTION, SOLUTION INTRAMUSCULAR; INTRAVENOUS ONCE
Status: COMPLETED | OUTPATIENT
Start: 2021-11-21 | End: 2021-11-21

## 2021-11-21 RX ORDER — ACETAMINOPHEN 325 MG/1
975 TABLET ORAL ONCE
Status: COMPLETED | OUTPATIENT
Start: 2021-11-21 | End: 2021-11-21

## 2021-11-21 RX ADMIN — ACETAMINOPHEN 975 MG: 325 TABLET, FILM COATED ORAL at 18:56

## 2021-11-21 RX ADMIN — KETOROLAC TROMETHAMINE 15 MG: 30 INJECTION, SOLUTION INTRAMUSCULAR at 18:57

## 2021-11-21 RX ADMIN — HYDROCHLOROTHIAZIDE 12.5 MG: 12.5 TABLET ORAL at 21:09

## 2021-11-22 ENCOUNTER — TELEPHONE (OUTPATIENT)
Dept: FAMILY MEDICINE CLINIC | Facility: CLINIC | Age: 61
End: 2021-11-22

## 2021-11-22 LAB
ATRIAL RATE: 75 BPM
P AXIS: 72 DEGREES
PR INTERVAL: 176 MS
QRS AXIS: -11 DEGREES
QRSD INTERVAL: 100 MS
QT INTERVAL: 370 MS
QTC INTERVAL: 413 MS
T WAVE AXIS: 48 DEGREES
VENTRICULAR RATE: 75 BPM

## 2021-11-22 PROCEDURE — 93010 ELECTROCARDIOGRAM REPORT: CPT | Performed by: INTERNAL MEDICINE

## 2021-11-24 ENCOUNTER — TELEPHONE (OUTPATIENT)
Dept: FAMILY MEDICINE CLINIC | Facility: CLINIC | Age: 61
End: 2021-11-24

## 2021-11-30 ENCOUNTER — TELEPHONE (OUTPATIENT)
Dept: FAMILY MEDICINE CLINIC | Facility: CLINIC | Age: 61
End: 2021-11-30

## 2021-12-04 ENCOUNTER — IMMUNIZATIONS (OUTPATIENT)
Dept: FAMILY MEDICINE CLINIC | Facility: HOSPITAL | Age: 61
End: 2021-12-04

## 2021-12-04 DIAGNOSIS — Z23 ENCOUNTER FOR IMMUNIZATION: Primary | ICD-10-CM

## 2021-12-04 PROCEDURE — 91300 COVID-19 PFIZER VACC 0.3 ML: CPT

## 2021-12-04 PROCEDURE — 0001A COVID-19 PFIZER VACC 0.3 ML: CPT

## 2021-12-12 DIAGNOSIS — K21.9 GERD WITHOUT ESOPHAGITIS: ICD-10-CM

## 2021-12-12 RX ORDER — ESOMEPRAZOLE MAGNESIUM 40 MG/1
CAPSULE, DELAYED RELEASE ORAL
Qty: 60 CAPSULE | Refills: 2 | Status: SHIPPED | OUTPATIENT
Start: 2021-12-12 | End: 2022-04-17

## 2021-12-13 ENCOUNTER — OFFICE VISIT (OUTPATIENT)
Dept: FAMILY MEDICINE CLINIC | Facility: CLINIC | Age: 61
End: 2021-12-13
Payer: COMMERCIAL

## 2021-12-13 VITALS
BODY MASS INDEX: 34.31 KG/M2 | DIASTOLIC BLOOD PRESSURE: 86 MMHG | HEART RATE: 80 BPM | HEIGHT: 64 IN | RESPIRATION RATE: 16 BRPM | OXYGEN SATURATION: 99 % | WEIGHT: 201 LBS | SYSTOLIC BLOOD PRESSURE: 124 MMHG

## 2021-12-13 DIAGNOSIS — I10 HYPERTENSION, UNSPECIFIED TYPE: Primary | ICD-10-CM

## 2021-12-13 DIAGNOSIS — R00.2 PALPITATIONS: ICD-10-CM

## 2021-12-13 DIAGNOSIS — K21.9 GASTROESOPHAGEAL REFLUX DISEASE, UNSPECIFIED WHETHER ESOPHAGITIS PRESENT: ICD-10-CM

## 2021-12-13 DIAGNOSIS — R07.89 CHEST PRESSURE: ICD-10-CM

## 2021-12-13 PROBLEM — R07.9 CHEST PAIN: Status: ACTIVE | Noted: 2021-12-13

## 2021-12-13 PROCEDURE — 1036F TOBACCO NON-USER: CPT | Performed by: NURSE PRACTITIONER

## 2021-12-13 PROCEDURE — 99214 OFFICE O/P EST MOD 30 MIN: CPT | Performed by: NURSE PRACTITIONER

## 2021-12-13 PROCEDURE — 3008F BODY MASS INDEX DOCD: CPT | Performed by: NURSE PRACTITIONER

## 2021-12-28 LAB
ALBUMIN SERPL-MCNC: 4.4 G/DL (ref 3.8–4.8)
ALBUMIN/GLOB SERPL: 1.8 {RATIO} (ref 1.2–2.2)
ALP SERPL-CCNC: 102 IU/L (ref 44–121)
ALT SERPL-CCNC: 21 IU/L (ref 0–32)
AST SERPL-CCNC: 20 IU/L (ref 0–40)
BILIRUB SERPL-MCNC: 0.3 MG/DL (ref 0–1.2)
BUN SERPL-MCNC: 14 MG/DL (ref 8–27)
BUN/CREAT SERPL: 18 (ref 12–28)
CALCIUM SERPL-MCNC: 9.5 MG/DL (ref 8.7–10.3)
CHLORIDE SERPL-SCNC: 108 MMOL/L (ref 96–106)
CO2 SERPL-SCNC: 23 MMOL/L (ref 20–29)
CREAT SERPL-MCNC: 0.77 MG/DL (ref 0.57–1)
GLOBULIN SER-MCNC: 2.4 G/DL (ref 1.5–4.5)
GLUCOSE SERPL-MCNC: 91 MG/DL (ref 65–99)
POTASSIUM SERPL-SCNC: 4.4 MMOL/L (ref 3.5–5.2)
PROT SERPL-MCNC: 6.8 G/DL (ref 6–8.5)
SL AMB EGFR AFRICAN AMERICAN: 96 ML/MIN/1.73
SL AMB EGFR NON AFRICAN AMERICAN: 84 ML/MIN/1.73
SODIUM SERPL-SCNC: 144 MMOL/L (ref 134–144)
TSH SERPL DL<=0.005 MIU/L-ACNC: 1.05 UIU/ML (ref 0.45–4.5)

## 2022-01-07 RX ORDER — MONTELUKAST SODIUM 10 MG/1
TABLET ORAL
COMMUNITY
Start: 2021-12-17 | End: 2022-01-17

## 2022-01-10 ENCOUNTER — OFFICE VISIT (OUTPATIENT)
Dept: FAMILY MEDICINE CLINIC | Facility: CLINIC | Age: 62
End: 2022-01-10
Payer: COMMERCIAL

## 2022-01-10 VITALS
DIASTOLIC BLOOD PRESSURE: 76 MMHG | OXYGEN SATURATION: 98 % | WEIGHT: 199 LBS | HEIGHT: 64 IN | BODY MASS INDEX: 33.97 KG/M2 | SYSTOLIC BLOOD PRESSURE: 120 MMHG | RESPIRATION RATE: 16 BRPM | HEART RATE: 97 BPM

## 2022-01-10 DIAGNOSIS — I10 PRIMARY HYPERTENSION: ICD-10-CM

## 2022-01-10 DIAGNOSIS — H93.13 TINNITUS OF BOTH EARS: ICD-10-CM

## 2022-01-10 DIAGNOSIS — K44.9 HERNIA, HIATAL: ICD-10-CM

## 2022-01-10 DIAGNOSIS — R00.2 PALPITATIONS: Primary | ICD-10-CM

## 2022-01-10 DIAGNOSIS — K22.70 BARRETT'S ESOPHAGUS WITHOUT DYSPLASIA: ICD-10-CM

## 2022-01-10 PROBLEM — R53.83 FATIGUE: Status: RESOLVED | Noted: 2020-07-10 | Resolved: 2022-01-10

## 2022-01-10 PROCEDURE — 99214 OFFICE O/P EST MOD 30 MIN: CPT | Performed by: FAMILY MEDICINE

## 2022-01-10 RX ORDER — METOPROLOL SUCCINATE 25 MG/1
25 TABLET, EXTENDED RELEASE ORAL DAILY
Qty: 90 TABLET | Refills: 1 | Status: SHIPPED | OUTPATIENT
Start: 2022-01-10 | End: 2022-07-20

## 2022-01-11 ENCOUNTER — OFFICE VISIT (OUTPATIENT)
Dept: GASTROENTEROLOGY | Facility: CLINIC | Age: 62
End: 2022-01-11
Payer: COMMERCIAL

## 2022-01-11 VITALS
HEART RATE: 73 BPM | DIASTOLIC BLOOD PRESSURE: 97 MMHG | WEIGHT: 199 LBS | HEIGHT: 64 IN | SYSTOLIC BLOOD PRESSURE: 137 MMHG | BODY MASS INDEX: 33.97 KG/M2

## 2022-01-11 DIAGNOSIS — K22.70 BARRETT'S ESOPHAGUS WITHOUT DYSPLASIA: ICD-10-CM

## 2022-01-11 DIAGNOSIS — R07.89 OTHER CHEST PAIN: ICD-10-CM

## 2022-01-11 DIAGNOSIS — K21.9 GERD WITHOUT ESOPHAGITIS: Primary | ICD-10-CM

## 2022-01-11 DIAGNOSIS — R10.30 LOWER ABDOMINAL PAIN: ICD-10-CM

## 2022-01-11 DIAGNOSIS — I10 PRIMARY HYPERTENSION: ICD-10-CM

## 2022-01-11 DIAGNOSIS — K59.01 SLOW TRANSIT CONSTIPATION: ICD-10-CM

## 2022-01-11 DIAGNOSIS — Z86.010 HX OF ADENOMATOUS COLONIC POLYPS: ICD-10-CM

## 2022-01-11 PROBLEM — F06.4 ANXIETY DISORDER DUE TO KNOWN PHYSIOLOGICAL CONDITION: Status: RESOLVED | Noted: 2020-07-10 | Resolved: 2022-01-11

## 2022-01-11 PROBLEM — R03.0 ELEVATED BLOOD PRESSURE READING: Status: RESOLVED | Noted: 2021-04-19 | Resolved: 2022-01-11

## 2022-01-11 PROCEDURE — 99214 OFFICE O/P EST MOD 30 MIN: CPT | Performed by: INTERNAL MEDICINE

## 2022-01-11 PROCEDURE — 3008F BODY MASS INDEX DOCD: CPT | Performed by: INTERNAL MEDICINE

## 2022-01-11 RX ORDER — DOCUSATE SODIUM 100 MG/1
100 CAPSULE, LIQUID FILLED ORAL 2 TIMES DAILY
Qty: 60 CAPSULE | Refills: 2 | Status: SHIPPED | OUTPATIENT
Start: 2022-01-11

## 2022-01-11 RX ORDER — DICYCLOMINE HYDROCHLORIDE 10 MG/1
10 CAPSULE ORAL
Qty: 120 CAPSULE | Refills: 2 | Status: SHIPPED | OUTPATIENT
Start: 2022-01-11

## 2022-01-11 NOTE — ASSESSMENT & PLAN NOTE
Patient has had extensive workup including normal exercise stress test during hospitalization back in July of 2020  Palpitations are better on Toprol XL

## 2022-01-11 NOTE — PROGRESS NOTES
Subjective:      Patient ID: Jena Hinkle is a 64 y o  female  49-year-old female presents for follow-up in regards to hypertension, anxiety, tinnitus  Patient still has tenderness  She thought that her tinnitus may have been related to calcium channel blocker so she was switched from diltiazem to Toprol XL  No improvement in tinnitus but her blood pressure and pulse rate are better controlled  Actually feels better on Toprol XL  Patient still complains of stuff sternal chest discomfort especially after eating  She states that after eating she feels discomfort and sensation that she needs to belch  She is on Nexium 40 mg twice daily  She does have history of Christianson's esophagus  She did have EGD performed in June which did show small hiatal hernia  She is scheduled to follow-up with gastroenterologist tomorrow    Normal TSH and CMP were recently completed        Past Medical History:   Diagnosis Date    Arthritis     Back pain     Christianson esophagus     Breast lump     last assessed 8/24/10    Colon polyp     GERD (gastroesophageal reflux disease)     Hyperlipidemia     Infectious viral hepatitis     Hepatitis B    Myalgia     last assessed  1/29/15    Myositis     last assessed  1/29/15    Pernicious anemia        Family History   Problem Relation Age of Onset    Atrial fibrillation Mother     Breast cancer Mother 61    Hypertension Mother    [facility] Cancer Mother     Hypertension Father     Aneurysm Sister         abdominal aortic    Cancer Maternal Grandmother     No Known Problems Sister     No Known Problems Daughter     Breast cancer Maternal Aunt 61    No Known Problems Maternal Aunt        Past Surgical History:   Procedure Laterality Date    CHOLECYSTECTOMY      COLONOSCOPY      EGD      KNEE ARTHROSCOPY Right     meniscus    NH KNEE SCOPE,SINGLE MENISECTOMY Right 6/3/2019    Procedure: RIGHT KNEE ARTHROSCOPIC PARTIAL MEDIAL MENISCECTOMY; CHONDROPLASTY;  Surgeon: Araceli España Hadley Grimaldo MD;  [facility];  Service: Orthopedics    TUBAL LIGATION      URETHRAL SLING      WISDOM TOOTH EXTRACTION          reports that she has quit smoking  She has never used smokeless tobacco  She reports current alcohol use  She reports that she does not use drugs  Current Outpatient Medications:     esomeprazole (NexIUM) 40 MG capsule, TAKE ONE CAPSULE BY MOUTH TWICE A DAY, Disp: 60 capsule, Rfl: 2    metoprolol succinate (Toprol XL) 25 mg 24 hr tablet, Take 1 tablet (25 mg total) by mouth daily, Disp: 90 tablet, Rfl: 1    montelukast (SINGULAIR) 10 mg tablet, , Disp: , Rfl:     The following portions of the patient's history were reviewed and updated as appropriate: allergies, current medications, past family history, past medical history, past social history, past surgical history and problem list     Review of Systems   Constitutional: Positive for fatigue  HENT: Negative  Eyes: Negative  Respiratory: Negative  Negative for shortness of breath  Cardiovascular: Positive for palpitations  Negative for chest pain  Gastrointestinal: Negative  GERD   Endocrine: Negative  Genitourinary: Negative  Musculoskeletal: Negative  Skin: Negative  Allergic/Immunologic: Negative  Neurological: Negative  Hematological: Negative  Psychiatric/Behavioral: The patient is not nervous/anxious  Objective:    /76   Pulse 97   Resp 16   Ht 5' 4" (1 626 m)   Wt 90 3 kg (199 lb)   LMP 01/01/2016   SpO2 98%   BMI 34 16 kg/m²      Physical Exam  Vitals and nursing note reviewed  Constitutional:       General: She is not in acute distress  Appearance: Normal appearance  She is well-developed  She is obese  She is not diaphoretic  HENT:      Head: Normocephalic and atraumatic        Right Ear: Tympanic membrane, ear canal and external ear normal       Left Ear: Tympanic membrane, ear canal and external ear normal    Eyes:      Extraocular Movements: Extraocular movements intact  Conjunctiva/sclera: Conjunctivae normal       Pupils: Pupils are equal, round, and reactive to light  Cardiovascular:      Rate and Rhythm: Normal rate and regular rhythm  Heart sounds: Normal heart sounds  No murmur heard  Pulmonary:      Effort: Pulmonary effort is normal       Breath sounds: Normal breath sounds  Abdominal:      General: Abdomen is flat  Bowel sounds are normal       Palpations: Abdomen is soft  Musculoskeletal:         General: Normal range of motion  Cervical back: Normal range of motion and neck supple  Skin:     General: Skin is warm and dry  Findings: No rash  Neurological:      General: No focal deficit present  Mental Status: She is alert and oriented to person, place, and time  Mental status is at baseline  Deep Tendon Reflexes: Reflexes are normal and symmetric  Psychiatric:         Mood and Affect: Mood normal          Behavior: Behavior normal          Thought Content:  Thought content normal          Judgment: Judgment normal            Recent Results (from the past 1008 hour(s))   Comprehensive metabolic panel    Collection Time: 12/27/21 10:12 AM   Result Value Ref Range    Glucose, Random 91 65 - 99 mg/dL    BUN 14 8 - 27 mg/dL    Creatinine 0 77 0 57 - 1 00 mg/dL    eGFR Non  84 >59 mL/min/1 73    eGFR  96 >59 mL/min/1 73    SL AMB BUN/CREATININE RATIO 18 12 - 28    Sodium 144 134 - 144 mmol/L    Potassium 4 4 3 5 - 5 2 mmol/L    Chloride 108 (H) 96 - 106 mmol/L    CO2 23 20 - 29 mmol/L    CALCIUM 9 5 8 7 - 10 3 mg/dL    Protein, Total 6 8 6 0 - 8 5 g/dL    Albumin 4 4 3 8 - 4 8 g/dL    Globulin, Total 2 4 1 5 - 4 5 g/dL    Albumin/Globulin Ratio 1 8 1 2 - 2 2    TOTAL BILIRUBIN 0 3 0 0 - 1 2 mg/dL    Alk Phos Isoenzymes 102 44 - 121 IU/L    AST 20 0 - 40 IU/L    ALT 21 0 - 32 IU/L   TSH, 3rd generation with Free T4 reflex    Collection Time: 12/27/21 10:12 AM Result Value Ref Range    TSH 1 050 0 450 - 4 500 uIU/mL       Assessment/Plan:    Christianson's esophagus without dysplasia  Patient did have recent EGD with Gastroenterology  She is scheduled to follow-up with gastroenterologist tomorrow  She remains on Nexium 40 mg twice daily  Quite a bit of proton pump inhibitor and still having symptoms of breakthrough reflux  Possibly related to hiatal hernia  Advised patient to discuss options for treatment of hiatal hernia with gastroenterologist    Primary hypertension  Hypertension is very well controlled on Toprol XL 25 mg once daily  Continue same  Refill provided  Hernia, hiatal  S still complaining of reflux symptoms despite being on high-dose proton pump inhibitor  She will follow-up with gastroenterologist tomorrow  In the past I did provide information for bariatric steam about possible Nissen fundoplication  This would still be an option on the table    Palpitations  Patient has had extensive workup including normal exercise stress test during hospitalization back in July of 2020  Palpitations are better on Toprol XL  Tinnitus of both ears  Does not seem to be related to calcium channel blockers since she still has tinnitus off of calcium channel blocker  Referral to ENT for evaluation  Not very many good options for treatment          Problem List Items Addressed This Visit        Digestive    Christianson's esophagus without dysplasia     Patient did have recent EGD with Gastroenterology  She is scheduled to follow-up with gastroenterologist tomorrow  She remains on Nexium 40 mg twice daily  Quite a bit of proton pump inhibitor and still having symptoms of breakthrough reflux  Possibly related to hiatal hernia  Advised patient to discuss options for treatment of hiatal hernia with gastroenterologist            Cardiovascular and Mediastinum    Primary hypertension     Hypertension is very well controlled on Toprol XL 25 mg once daily    Continue same   Refill provided  Relevant Medications    metoprolol succinate (Toprol XL) 25 mg 24 hr tablet       Other    Hernia, hiatal     S still complaining of reflux symptoms despite being on high-dose proton pump inhibitor  She will follow-up with gastroenterologist tomorrow  In the past I did provide information for bariatric steam about possible Nissen fundoplication  This would still be an option on the table         Palpitations - Primary     Patient has had extensive workup including normal exercise stress test during hospitalization back in July of 2020  Palpitations are better on Toprol XL  Tinnitus of both ears     Does not seem to be related to calcium channel blockers since she still has tinnitus off of calcium channel blocker  Referral to ENT for evaluation  Not very many good options for treatment         Relevant Orders    Ambulatory Referral to Otolaryngology          BMI Counseling: Body mass index is 34 16 kg/m²  The BMI is above normal  Exercise recommendations include exercising 3-5 times per week

## 2022-01-11 NOTE — ASSESSMENT & PLAN NOTE
Patient did have recent EGD with Gastroenterology  She is scheduled to follow-up with gastroenterologist tomorrow  She remains on Nexium 40 mg twice daily  Quite a bit of proton pump inhibitor and still having symptoms of breakthrough reflux  Possibly related to hiatal hernia    Advised patient to discuss options for treatment of hiatal hernia with gastroenterologist

## 2022-01-11 NOTE — PROGRESS NOTES
[facility] - Outpatient Follow-up Note  Kelly Moore 64 y o  female MRN: [id number]  Encounter: [id number]          ASSESSMENT AND PLAN:      1  GERD without esophagitis  Has some vague chest pains especially when she walks feels like something is moving inside will get CT scan chest additionally abdomen and pelvis due to intermittent abdominal pains  May need Bravo placement after she is cleared by Cardiology  I have also addressed the issue of weight gain in history to consider portion control weight loss and exercise if possible  - CT chest abdomen pelvis w contrast; Future    2  Christianson's esophagus without dysplasia  Surveillance is up-to-date next endoscopy 3 years    3  Hx of adenomatous colonic polyps  Colon cancer screening up-to-date next colonoscopy 5 years    4  Slow transit constipation  Will initiate Colace especially due to some short angles in the colon  When she has her spells of abdominal discomfort will try adding dicyclomine to curtail the course  - dicyclomine (BENTYL) 10 mg capsule; Take 1 capsule (10 mg total) by mouth 4 (four) times a day (before meals and at bedtime)  Dispense: 120 capsule; Refill: 2  - docusate sodium (COLACE) 100 mg capsule; Take 1 capsule (100 mg total) by mouth 2 (two) times a day  Dispense: 60 capsule; Refill: 2    5  Other chest pain  Due for cardiac evaluation  May need Koroma testing  Dutta the above may consider trial of Pamelor  - CT chest abdomen pelvis w contrast; Future    6  Lower abdominal pain  Cannot exclude IBS with spasm  When she has these lower abdominal pains will initiate dicyclomine in the meantime will continue with the Colace  Occasional rectal pains  Await CT scan chest abdomen and pelvis  - Basic metabolic panel; Future    7  Primary hypertension  She will continue follow-up with you regarding this    She will otherwise see me in 3 months     ______________________________________________________________________    SUBJECTIVE:  Very pleasant 70-year-old lady presents for follow-up after EGD and colonoscopy  We reviewed her findings in detail  She also had several pages of questions which I attempted to address  She describes several months of until feeling in her chest she is to have a cardiac evaluation  Additionally since I saw her she was seen in the emergency room was found to have hypertension and was started on a beta-blocker  Pending her cardiac evaluation we will order CT scan consider Bravo testing and possible Pamelor pending course  She has gained weight we discussed the impact of that on musculoskeletal situations of the thorax  She has difficulty moving her bowels at times due to hard stools we discussed fiber will initiate a stool softener which she will take every day  Additionally she has a vague bouts of lower abdominal pain sometimes suprapubic sometimes left lower quadrant will give her an anti spasmodic to use during her 1 or 2 weeks of these bouts of discomfort  No melena bright red blood per rectum  Her previously noted epigastric pain is resolved  REVIEW OF SYSTEMS IS OTHERWISE NEGATIVE        Historical Information   Past Medical History:   Diagnosis Date    Arthritis     Back pain     Christianson esophagus     Breast lump     last assessed 8/24/10    Colon polyp     GERD (gastroesophageal reflux disease)     Hyperlipidemia     Infectious viral hepatitis     Hepatitis B    Myalgia     last assessed  1/29/15    Myositis     last assessed  1/29/15    Pernicious anemia      Past Surgical History:   Procedure Laterality Date    CHOLECYSTECTOMY      COLONOSCOPY      EGD      KNEE ARTHROSCOPY Right     meniscus    [place] KNEE SCOPE,SINGLE MENISECTOMY Right 6/3/2019    Procedure: RIGHT KNEE ARTHROSCOPIC PARTIAL MEDIAL MENISCECTOMY; CHONDROPLASTY;  Surgeon: Jose Elias Meadows MD;  [facility]; Service: Orthopedics    TUBAL LIGATION      URETHRAL SLING      WISDOM TOOTH EXTRACTION       Social History   Social History     Substance and Sexual Activity   Alcohol Use Yes    Comment: socially     Social History     Substance and Sexual Activity   Drug Use No     Social History     Tobacco Use   Smoking Status Former Smoker   Smokeless Tobacco Never Used   Tobacco Comment    Quit at age 32     Family History   Problem Relation Age of Onset    Atrial fibrillation Mother     Breast cancer Mother 61    Hypertension Mother     Cancer Mother     Hypertension Father     Aneurysm Sister         abdominal aortic    Cancer Maternal Grandmother     No Known Problems Sister     No Known Problems Daughter     Breast cancer Maternal Aunt 61    No Known Problems Maternal Aunt        Meds/Allergies       Current Outpatient Medications:     esomeprazole (NexIUM) 40 MG capsule    metoprolol succinate (Toprol XL) 25 mg 24 hr tablet    dicyclomine (BENTYL) 10 mg capsule    docusate sodium (COLACE) 100 mg capsule    montelukast (SINGULAIR) 10 mg tablet    Allergies   Allergen Reactions    Lactose - Food Allergy GI Intolerance           Objective     Blood pressure 137/97, pulse 73, height 5' 4" (1 626 m), weight 90 3 kg (199 lb), last menstrual period 01/01/2016  Body mass index is 34 16 kg/m²  PHYSICAL EXAM:      General Appearance:   Alert, cooperative, no distress   HEENT:   Normocephalic, atraumatic, anicteric      Neck:  Supple, symmetrical, trachea midline   Lungs:   Clear to auscultation bilaterally; no rales, rhonchi or wheezing; respirations unlabored    Heart[de-identified]   Regular rate and rhythm; no murmur, rub, or gallop     Abdomen:   Soft, non-tender, non-distended; normal bowel sounds; no masses, no organomegaly    Genitalia:   Deferred    Rectal:   Deferred    Extremities:  No cyanosis, clubbing or edema    Pulses:  2+ and symmetric    Skin:  No jaundice, rashes, or lesions    Lymph nodes:  No palpable cervical lymphadenopathy        Lab Results:   No visits with results within 1 Day(s) from this visit  Latest known visit with results is:   Orders Only on 12/27/2021   Component Date Value    Glucose, Random 12/27/2021 91     BUN 12/27/2021 14     Creatinine 12/27/2021 0 77     eGFR Non  12/27/2021 84     eGFR  12/27/2021 96     SL AMB BUN/CREATININE RA* 12/27/2021 18     Sodium 12/27/2021 144     Potassium 12/27/2021 4 4     Chloride 12/27/2021 108*    CO2 12/27/2021 23     CALCIUM 12/27/2021 9 5     Protein, Total 12/27/2021 6 8     Albumin 12/27/2021 4 4     Globulin, Total 12/27/2021 2 4     Albumin/Globulin Ratio 12/27/2021 1 8     TOTAL BILIRUBIN 12/27/2021 0 3     Alk Phos Isoenzymes 12/27/2021 102     AST 12/27/2021 20     ALT 12/27/2021 21     TSH 12/27/2021 1 050          Radiology Results:   No results found

## 2022-01-11 NOTE — ASSESSMENT & PLAN NOTE
Does not seem to be related to calcium channel blockers since she still has tinnitus off of calcium channel blocker  Referral to ENT for evaluation    Not very many good options for treatment

## 2022-01-11 NOTE — ASSESSMENT & PLAN NOTE
S still complaining of reflux symptoms despite being on high-dose proton pump inhibitor  She will follow-up with gastroenterologist tomorrow  In the past I did provide information for bariatric steam about possible Nissen fundoplication    This would still be an option on the table

## 2022-01-17 DIAGNOSIS — J30.89 ALLERGIC RHINITIS DUE TO HOUSE DUST MITE: Primary | ICD-10-CM

## 2022-01-17 RX ORDER — MONTELUKAST SODIUM 10 MG/1
TABLET ORAL
Qty: 30 TABLET | Refills: 1 | Status: SHIPPED | OUTPATIENT
Start: 2022-01-17

## 2022-01-18 ENCOUNTER — TELEPHONE (OUTPATIENT)
Dept: GASTROENTEROLOGY | Facility: CLINIC | Age: 62
End: 2022-01-18

## 2022-01-18 NOTE — TELEPHONE ENCOUNTER
SPOKE WITH PT, TAKING BENTYL SEEMS TO BE HELPING, WANTS TO KNOW IF SHE SHOULD BE TAKING DAILY OR PRN  PLEASE ADVISE

## 2022-01-21 ENCOUNTER — HOSPITAL ENCOUNTER (OUTPATIENT)
Dept: RADIOLOGY | Facility: HOSPITAL | Age: 62
Discharge: HOME | End: 2022-01-21
Attending: INTERNAL MEDICINE
Payer: COMMERCIAL

## 2022-01-21 DIAGNOSIS — R07.89 OTHER CHEST PAIN: ICD-10-CM

## 2022-01-21 DIAGNOSIS — K21.9 GERD WITHOUT ESOPHAGITIS: ICD-10-CM

## 2022-01-21 PROCEDURE — G1004 CDSM NDSC: HCPCS

## 2022-01-21 PROCEDURE — 71260 CT THORAX DX C+: CPT

## 2022-01-21 PROCEDURE — 74177 CT ABD & PELVIS W/CONTRAST: CPT

## 2022-01-21 RX ADMIN — IOHEXOL 100 ML: 350 INJECTION, SOLUTION INTRAVENOUS at 11:11

## 2022-01-24 ENCOUNTER — OFFICE VISIT (OUTPATIENT)
Dept: CARDIOLOGY CLINIC | Facility: CLINIC | Age: 62
End: 2022-01-24
Payer: COMMERCIAL

## 2022-01-24 ENCOUNTER — TELEPHONE (OUTPATIENT)
Dept: CARDIOLOGY CLINIC | Facility: CLINIC | Age: 62
End: 2022-01-24

## 2022-01-24 VITALS
DIASTOLIC BLOOD PRESSURE: 88 MMHG | BODY MASS INDEX: 33.94 KG/M2 | SYSTOLIC BLOOD PRESSURE: 124 MMHG | HEIGHT: 64 IN | HEART RATE: 70 BPM | WEIGHT: 198.8 LBS

## 2022-01-24 DIAGNOSIS — E78.2 MIXED HYPERLIPIDEMIA: Primary | ICD-10-CM

## 2022-01-24 DIAGNOSIS — R07.89 CHEST PRESSURE: ICD-10-CM

## 2022-01-24 DIAGNOSIS — R00.2 PALPITATIONS: ICD-10-CM

## 2022-01-24 DIAGNOSIS — I10 PRIMARY HYPERTENSION: ICD-10-CM

## 2022-01-24 PROCEDURE — 99214 OFFICE O/P EST MOD 30 MIN: CPT | Performed by: INTERNAL MEDICINE

## 2022-01-24 PROCEDURE — 1036F TOBACCO NON-USER: CPT | Performed by: INTERNAL MEDICINE

## 2022-01-24 PROCEDURE — 93000 ELECTROCARDIOGRAM COMPLETE: CPT | Performed by: INTERNAL MEDICINE

## 2022-01-24 NOTE — PATIENT INSTRUCTIONS
Low-Sodium Diet   AMBULATORY CARE:   A low-sodium diet  limits foods that are high in sodium (salt)  You will need to follow a low-sodium diet if you have high blood pressure, kidney disease, or heart failure  You may also need to follow this diet if you have a condition that is causing your body to retain (hold) extra fluid  You may need to limit the amount of sodium you eat in a day to 1,500 to 2,000 mg  Ask your healthcare provider how much sodium you can have each day  How to use food labels to choose foods that are low in sodium:  Read food labels to find the amount of sodium they contain  The amount of sodium is listed in milligrams (mg)  The % Daily Value (DV) column tells you how much of your daily needs are met by 1 serving of the food for each nutrient listed  Choose foods that have less than 5% of the DV of sodium  These foods are considered low in sodium  Foods that have 20% or more of the DV of sodium are considered high in sodium  Some food labels may also list any of the following terms that tell you about the sodium content in the food:  · Sodium-free:  Less than 5 mg in each serving    · Very low sodium:  35 mg of sodium or less in each serving    · Low sodium:  140 mg of sodium or less in each serving    · Reduced sodium: At least 25% less sodium in each serving than the regular type    · Light in sodium:  50% less sodium in each serving    · Unsalted or no added salt:  No extra salt is added during processing (the food may still contain sodium)       Foods to avoid:  Salty foods are high in sodium  You should avoid the following:  · Processed foods:      ? Mixes for cornbread, biscuits, cake, and pudding     ? Instant foods, such as potatoes, cereals, noodles, and rice     ? Packaged foods, such as bread stuffing, rice and pasta mixes, snack dip mixes, and macaroni and cheese     ? Canned foods, such as canned vegetables, soups, broths, sauces, and vegetable or tomato juice    ?  Snack foods, such as salted chips, popcorn, pretzels, pork rinds, salted crackers, and salted nuts    ? Frozen foods, such as dinners, entrees, vegetables with sauces, and breaded meats    ? Sauerkraut, pickled vegetables, and other foods prepared in brine    · Meats and cheeses:      ? Smoked or cured meat, such as corned beef, melara, ham, hot dogs, and sausage    ? Canned meats or spreads, such as potted meats, sardines, anchovies, and imitation seafood    ? Deli or lunch meats, such as bologna, ham, turkey, and roast beef    ? Processed cheese, such as American cheese and cheese spreads    · Condiments, sauces, and seasonings:      ? Salt (¼ teaspoon of salt contains 575 mg of sodium)    ? Seasonings made with salt, such as garlic salt, celery salt, onion salt, and seasoned salt    ? Regular soy sauce, barbecue sauce, teriyaki sauce, steak sauce, Worcestershire sauce, and most flavored vinegars    ? Canned gravy and mixes     ? Regular condiments, such as mustard, ketchup, and salad dressings    ? Pickles and olives    ? Meat tenderizers and monosodium glutamate (MSG)    Foods to include:  Read the food label to find the exact amount of sodium in each serving  · Bread and cereal:  Try to choose breads with less than 80 mg of sodium per serving  ? Bread, roll, kash, tortilla, or unsalted crackers  ? Ready-to-eat cereals with less than 5% DV of sodium (examples include shredded wheat and puffed rice)    ? Pasta    · Vegetables and fruits:      ? Unsalted fresh, frozen, or canned vegetables    ? Fresh, frozen, or canned fruits    ? Fruit juice    · Dairy:  One serving has about 150 mg of sodium  ? Milk, all types    ? Yogurt    ? Hard cheese, such as cheddar, Swiss, [employer], or mozzarella    · Meat and other protein foods:  Some raw meats may have added sodium  ? Plain meats, fish, and poultry     ? Eggs    · Other foods:      ? Homemade pudding    ? Unsalted nuts, popcorn, or pretzels    ?  Unsalted butter or margarine    Ways to decrease sodium:   · Add spices and herbs to foods instead of salt during cooking  Use salt-free seasonings to add flavor to foods  Examples include onion powder, garlic powder, basil, covarrubias powder, paprika, and parsley  Try lemon or lime juice or vinegar to give foods a tart flavor  Use hot peppers, pepper, or cayenne pepper to add a spicy flavor  · Do not keep a salt shaker at your kitchen table  This may help keep you from adding salt to food at the table  A teaspoon of salt has 2,300 mg of sodium  It may take time to get used to enjoying the natural flavor of food instead of adding salt  Talk to your healthcare provider before you use salt substitutes  Some salt substitutes have a high amount of potassium and need to be avoided if you have kidney disease  · Choose low-sodium foods at restaurants  Meals from restaurants are often high in sodium  Some restaurants have nutrition information on the menu that tells you the amount of sodium in their foods  If possible, ask for your food to be prepared with less, or no salt  · Shop for unsalted or low-sodium foods and snacks at the grocery store  Examples include unsalted or low-sodium broths, soups, and canned vegetables  Choose fresh or frozen vegetables instead  Choose unsalted nuts or seeds or fresh fruits or vegetables as snacks  Read food labels and choose salt-free, very low-sodium, or low-sodium foods  © Copyright [employer] 2021 Information is for End User's use only and may not be sold, redistributed or otherwise used for commercial purposes  All illustrations and images included in CareNotes® are the copyrighted property of A D A M , Inc  or [employer]   The above information is an  only  It is not intended as medical advice for individual conditions or treatments  Talk to your doctor, nurse or pharmacist before following any medical regimen to see if it is safe and effective for you

## 2022-01-24 NOTE — TELEPHONE ENCOUNTER
Payment was already collected by Gisselle Vázquez via cc  & drawer was closed @ [address] to call IT to get them to change from NEW to [address] [address] since pt already seen - IT made the change - When I checked in & out after the change, It asked for a co-pay but it was already collected - buddy

## 2022-01-24 NOTE — PROGRESS NOTES
Cardiology Follow Up    Man Marlene  1960  [id number]  [facility] [address] [id number] [phone]    1  Primary hypertension  Ambulatory referral to Cardiology    POCT ECG   2  Palpitations  Ambulatory referral to Cardiology   3  Chest pressure  Ambulatory referral to Cardiology       Discussion:    1  Hypertension - Lennie's blood pressure had been running high over the last few months  A lot of it is situational, but her resting blood pressures ran high at times  Her PCP started diltiazem but then switched her over to Toprol-XL  This has helped control her blood pressure better  I went over with her following a low-sodium diet and healthy lifestyle changes  She will continue to check her blood pressure home  We ordered an echocardiogram   We will see her back in 6 months  2   Palpitations - She has had these on and off over the last few years  She did wear both a Holter monitor and an extended ambulatory monitor that did not show any abnormalities  These appear improved with Toprol-XL  3   Chest pain/pressure - This is very atypical and is not angina  It has been constant for 2 months  It is likely GI driven, but there also appears to be a musculoskeletal component  She does have a gastroenterologist who she will follow up with  She had a stress echocardiogram about a year and half ago for the same symptoms that was normal     4   Hyperlipidemia - Her LDL is elevated but she is not at a level that needs treatment as of yet  Her overall 10 year cardiovascular/ASCVD risk is 4 7%  We will check blood work after our next visit  Interval History:     Mrs April Ferrer comes in for visit to discuss various symptoms that she has been experiencing as of late and given her risk factors for cardiovascular disease, namely hypertension    Shane Archer at one point saw Dr Flaco Bishop for similar symptoms about a year and half ago  Around that time she had a stress echo done which was normal, and she then were both a 48 hour Holter monitor and then an extended ambulatory Holter that were both normal   No arrhythmias were detected  Her chest pain/ pressure was very atypical and did not appear to cardiac related  Geraldine Wang continues to get the same symptoms that she originally saw us for back then but she also has been having some issues with hypertension  Geraldine Wang continues to get intermittent palpitations, intermittent lightheadedness if she stands up too quickly and chest pressure  Her chest pressure has been constant and is very atypical   She does notice some changes with position but at other times it is just constantly there  She took a stool softener yesterday and notice that the symptoms got worse  None of the symptoms are exertional   She does not appear to be describing any symptoms of angina  She shows no signs or symptoms of CHF  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, as she delivers mail, and can do this without any change in symptoms or limitations  She does occasionally feel her pulse into her neck and ears and some ringing in her ears, particularly if her blood pressure is running high  Recently her PCP started diltiazem and at some point changed her over to metoprolol  Her blood pressure remained elevated on diltiazem, but on Toprol it is more normal and is improving  She also seems to be tolerating the Toprol      Patient Active Problem List   Diagnosis    Neuralgia    Esophageal reflux    Gastritis    Hernia, hiatal    Hyperlipidemia    Idiopathic peripheral neuropathy    Chronic low back pain    Varicose veins of right lower extremity with pain    Urinary frequency    Acquired deformity of foot    Pain in both feet    Congenital pes planus of right foot    Congenital pes planus of left foot    Radiculopathy of lumbar region    Encounter for gynecological examination (general) (routine) without abnormal findings    Rosacea    Class 2 obesity due to excess calories without serious comorbidity in adult    Primary osteoarthritis of right knee    Tear of medial meniscus of right knee    Peripheral tear of medial meniscus of right knee as current injury    Exposure to COVID-19 virus    Dry mouth    Lumbar spondylosis    Allergic rhinitis due to house dust mite    Palpitations    Christianson's esophagus without dysplasia    Hx of adenomatous colonic polyps    Primary hypertension    Chest pain    Tinnitus of both ears    Lower abdominal pain     Past Medical History:   Diagnosis Date    Arthritis     Back pain     Christianson esophagus     Breast lump     last assessed 8/24/10    Colon polyp     GERD (gastroesophageal reflux disease)     Hearing problem     Hyperlipemia     Hyperlipidemia     Hypertension     Infectious viral hepatitis     Hepatitis B    Myalgia     last assessed  1/29/15    Myositis     last assessed  1/29/15    Pernicious anemia     Tinnitus      Social History     Socioeconomic History    Marital status: /Civil Union     Spouse name: Shea Lind Number of children: 3    Years of education: Not on file    Highest education level: Not on file   Occupational History    Occupation:    Tobacco Use    Smoking status: Former Smoker    Smokeless tobacco: Never Used    Tobacco comment: Quit at age 32   Vaping Use    Vaping Use: Never used   Substance and Sexual Activity    Alcohol use: Yes     Comment: socially    Drug use: No    Sexual activity: Not Currently     Partners: Male     Birth control/protection: Female Sterilization, Post-menopausal   Other Topics Concern    Not on file   Social History Narrative    Denied hx of domestic violence        Who lives in your home:  and son    What type of home do you live in: Single house    Age of your home: 150 yrs     How long have you been living there: 34 years    Type of heat: Forced hot air    Type of fuel: Oil    What type of dilia is in your bedroom: Area rugs and Hardwood floor    Do you have the following in or near your home:    Air products: Window air conditioning    Pests: Mice and Other - birds in window sill    Pets: 1 Cat    Are pets allowed in bedroom: Yes    Open fields, wooded areas nearby: Open fields and Wooded areas    Basement: Damp, Musty and Unfinished    Exposure to second hand smoke: Yes at home        Habits:    Caffeine;none now    Chocolate: very occasionally    Other:         Social Determinants of Health     Financial Resource Strain: Not on file   Food Insecurity: Not on file   Transportation Needs: Not on file   Physical Activity: Not on file   Stress: Not on file   Social Connections: Not on file   Intimate Partner Violence: Not on file   Housing Stability: Not on file      Family History   Problem Relation Age of Onset    Atrial fibrillation Mother     Breast cancer Mother 61    Hypertension Mother     Cancer Mother     Hypertension Father     Aneurysm Sister         abdominal aortic    Cancer Maternal Grandmother     No Known Problems Sister     No Known Problems Daughter     Breast cancer Maternal Aunt 61    No Known Problems Maternal Aunt      Past Surgical History:   Procedure Laterality Date    CHOLECYSTECTOMY      COLONOSCOPY      EGD     [facility]    KNEE ARTHROSCOPY Right     meniscus    LA KNEE SCOPE,SINGLE MENISECTOMY Right 06/03/2019    Procedure: RIGHT KNEE ARTHROSCOPIC PARTIAL MEDIAL MENISCECTOMY; CHONDROPLASTY;  Surgeon: Leno Jack MD;  Location: [facility];  Service: Orthopedics    TUBAL LIGATION      URETHRAL SLING      VAGINA SURGERY  2014    Vaginal sling    WISDOM TOOTH EXTRACTION         Current Outpatient Medications:     dicyclomine (BENTYL) 10 mg capsule, Take 1 capsule (10 mg total) by mouth 4 (four) times a day (before meals and at bedtime) (Patient taking differently: Take 10 mg by mouth 4 (four) times a day (before meals and at bedtime) PRN ), Disp: 120 capsule, Rfl: 2    docusate sodium (COLACE) 100 mg capsule, Take 1 capsule (100 mg total) by mouth 2 (two) times a day (Patient taking differently: Take 100 mg by mouth 2 (two) times a day PRN ), Disp: 60 capsule, Rfl: 2    esomeprazole (NexIUM) 40 MG capsule, TAKE ONE CAPSULE BY MOUTH TWICE A DAY (Patient taking differently: QD ), Disp: 60 capsule, Rfl: 2    metoprolol succinate (Toprol XL) 25 mg 24 hr tablet, Take 1 tablet (25 mg total) by mouth daily, Disp: 90 tablet, Rfl: 1    montelukast (SINGULAIR) 10 mg tablet, TAKE ONE TABLET BY MOUTH EVERY DAY AT BEDTIME (Patient not taking: Reported on 1/24/2022), Disp: 30 tablet, Rfl: 1  Allergies   Allergen Reactions    Lactose - Food Allergy GI Intolerance       Labs:  Lab Results   Component Value Date     10/28/2017    K 4 4 12/27/2021     (H) 12/27/2021    CO2 23 12/27/2021    BUN 14 12/27/2021    CREATININE 0 77 12/27/2021    CREATININE 0 99 11/21/2021    CREATININE 0 73 10/28/2017    GLUCOSE 103 (H) 10/28/2017    CALCIUM 8 7 11/21/2021    CALCIUM 9 4 10/28/2017     Lab Results   Component Value Date    WBC 7 59 11/21/2021    WBC 0-5 03/06/2017    HGB 14 3 11/21/2021    HCT 43 1 11/21/2021    MCV 88 11/21/2021     11/21/2021     Lab Results   Component Value Date    CHOL 215 (H) 10/28/2017    TRIG 134 07/11/2020    TRIG 243 (H) 10/16/2018    HDL 61 07/11/2020    HDL 52 10/16/2018     Imaging:  ECG today shows sinus rhythm and is within normal limits  STRESS ECHO (7/2021): IMPRESSIONS:  1  Stress echocardiogram is negative for myocardial ischemia  2  ECG portion of stress test is negative for myocardial ischemia  3  Exercise time 9 minutes achieving 116% of MPHR with a total workload of 10 1 METs  4  Rare VPCs  5  Normal blood pressure response was noted  6  There were no significant symptoms  7  Aortic root was found to be mildly dilated at 3 8 cm  8   There is no study for comparison     SUMMARY     STRESS RESULTS:  Duration of exercise was 9 min  Maximal work rate was 10 1 METs  Maximal heart rate during stress was 187 bpm ( 116 % of maximal predicted heart rate)  Target heart rate was achieved  There was no chest pain during stress  Review of Systems:  Review of Systems   Constitutional: Negative  HENT: Negative  Eyes: Negative  Respiratory: Negative  Cardiovascular: Positive for chest pain and palpitations  Gastrointestinal: Negative  Musculoskeletal: Negative  Skin: Negative  Allergic/Immunologic: Negative  Neurological: Negative  Hematological: Negative  Psychiatric/Behavioral: Negative  All other systems reviewed and are negative  Vitals:    01/24/22 0952   BP: 124/88   BP Location: Left arm   Patient Position: Sitting   Cuff Size: Large   Pulse: 70   Weight: 90 2 kg (198 lb 12 8 oz)   Height: 5' 4" (1 626 m)     Physical Exam:  Physical Exam  Vitals and nursing note reviewed  Constitutional:       Appearance: She is well-developed  HENT:      Head: Normocephalic and atraumatic  Eyes:      General: No scleral icterus  Right eye: No discharge  Left eye: No discharge  Pupils: Pupils are equal, round, and reactive to light  Neck:      Thyroid: No thyromegaly  Vascular: No JVD  Cardiovascular:      Rate and Rhythm: Normal rate and regular rhythm  No extrasystoles are present  Pulses: Normal pulses  No decreased pulses  Heart sounds: Normal heart sounds, S1 normal and S2 normal  No murmur heard  No friction rub  No gallop  Pulmonary:      Effort: Pulmonary effort is normal  No respiratory distress  Breath sounds: Normal breath sounds  No wheezing or rales  Abdominal:      General: Bowel sounds are normal  There is no distension  Palpations: Abdomen is soft  Tenderness: There is no abdominal tenderness  Musculoskeletal:         General: No tenderness or deformity  Normal range of motion  Cervical back: Normal range of motion and neck supple  Skin:     General: Skin is warm and dry  Findings: No rash  Neurological:      Mental Status: She is alert and oriented to person, place, and time  Cranial Nerves: No cranial nerve deficit  Psychiatric:         Thought Content: Thought content normal          Judgment: Judgment normal        Counseling / Coordination of Care  Total office time spent today 30 minutes  Greater than 50% of total time was spent with the patient and / or family counseling and / or coordination of care

## 2022-02-21 ENCOUNTER — HOSPITAL ENCOUNTER (OUTPATIENT)
Dept: NON INVASIVE DIAGNOSTICS | Facility: HOSPITAL | Age: 62
Discharge: HOME | End: 2022-02-21
Payer: COMMERCIAL

## 2022-02-21 VITALS
HEART RATE: 68 BPM | DIASTOLIC BLOOD PRESSURE: 66 MMHG | BODY MASS INDEX: 33.8 KG/M2 | SYSTOLIC BLOOD PRESSURE: 128 MMHG | HEIGHT: 64 IN | WEIGHT: 198 LBS

## 2022-02-21 DIAGNOSIS — I10 PRIMARY HYPERTENSION: ICD-10-CM

## 2022-02-21 PROCEDURE — 93306 TTE W/DOPPLER COMPLETE: CPT

## 2022-02-22 LAB
AORTIC ROOT: 3.3 CM
E WAVE DECELERATION TIME: 222 MS
FRACTIONAL SHORTENING: 31 % (ref 28–44)
INTERVENTRICULAR SEPTUM IN DIASTOLE (PARASTERNAL SHORT AXIS VIEW): 1.1 CM (ref 0.54–1.01)
INTERVENTRICULAR SEPTUM: 1.1 CM (ref 0.6–1.1)
LAAS-AP4: 15.2 CM2
LEFT ATRIUM SIZE: 4.1 CM
LEFT INTERNAL DIMENSION IN SYSTOLE: 3.4 CM (ref 3.06–4.64)
LEFT VENTRICULAR INTERNAL DIMENSION IN DIASTOLE: 4.9 CM (ref 5.04–7.51)
LEFT VENTRICULAR POSTERIOR WALL IN END DIASTOLE: 0.9 CM (ref 0.52–0.99)
LEFT VENTRICULAR STROKE VOLUME: 64 ML
LVSV (TEICH): 64 ML
MV E'TISSUE VEL-LAT: 8 CM/S
MV E'TISSUE VEL-SEP: 6 CM/S
MV PEAK A VEL: 0.8 M/S
MV PEAK E VEL: 56 CM/S
MV STENOSIS PRESSURE HALF TIME: 64 MS
MV VALVE AREA P 1/2 METHOD: 3.44 CM2
RIGHT VENTRICLE ID DIMENSION: 2.8 CM
SL CV LV EF: 58
SL CV PED ECHO LEFT VENTRICLE DIASTOLIC VOLUME (MOD BIPLANE) 2D: 111 ML
SL CV PED ECHO LEFT VENTRICLE SYSTOLIC VOLUME (MOD BIPLANE) 2D: 47 ML
TR MAX PG: 17 MMHG
TR PEAK VELOCITY: 2.1 M/S
TRICUSPID VALVE PEAK REGURGITATION VELOCITY: 2.07 M/S
Z-SCORE OF INTERVENTRICULAR SEPTUM IN END DIASTOLE: 2.73
Z-SCORE OF LEFT VENTRICULAR DIMENSION IN END DIASTOLE: -2.27
Z-SCORE OF LEFT VENTRICULAR DIMENSION IN END SYSTOLE: -0.81
Z-SCORE OF LEFT VENTRICULAR POSTERIOR WALL IN END DIASTOLE: 1.17

## 2022-02-22 PROCEDURE — 93306 TTE W/DOPPLER COMPLETE: CPT | Performed by: INTERNAL MEDICINE

## 2022-04-17 DIAGNOSIS — K21.9 GERD WITHOUT ESOPHAGITIS: ICD-10-CM

## 2022-04-17 RX ORDER — ESOMEPRAZOLE MAGNESIUM 40 MG/1
CAPSULE, DELAYED RELEASE ORAL
Qty: 60 CAPSULE | Refills: 2 | Status: SHIPPED | OUTPATIENT
Start: 2022-04-17

## 2022-05-09 ENCOUNTER — TELEPHONE (OUTPATIENT)
Dept: OTHER | Facility: OTHER | Age: 62
End: 2022-05-09

## 2022-05-09 NOTE — TELEPHONE ENCOUNTER
Called and spoke with Tyson Salazar  She took a covid test on 05/07 which was negative  She also stated that she does have a slight scratchy throat but she has seasonal allergies  I advised her that she is able to come for her appointment and will have to wear a mask while in the office  She expressed understanding

## 2022-05-09 NOTE — TELEPHONE ENCOUNTER
Pt called in stating she has an appt tomorrow and she was exposed to a covid positive patient last Sunday  She wants to know if she can still come in  She tested negative  She is requesting a call back

## 2022-05-10 ENCOUNTER — OFFICE VISIT (OUTPATIENT)
Dept: GASTROENTEROLOGY | Facility: CLINIC | Age: 62
End: 2022-05-10
Payer: COMMERCIAL

## 2022-05-10 VITALS
DIASTOLIC BLOOD PRESSURE: 89 MMHG | BODY MASS INDEX: 34.15 KG/M2 | SYSTOLIC BLOOD PRESSURE: 136 MMHG | HEIGHT: 64 IN | HEART RATE: 78 BPM | WEIGHT: 200 LBS

## 2022-05-10 DIAGNOSIS — R10.84 GENERALIZED ABDOMINAL PAIN: ICD-10-CM

## 2022-05-10 DIAGNOSIS — Z86.010 HX OF ADENOMATOUS COLONIC POLYPS: ICD-10-CM

## 2022-05-10 DIAGNOSIS — K21.9 GERD WITHOUT ESOPHAGITIS: ICD-10-CM

## 2022-05-10 DIAGNOSIS — R07.89 OTHER CHEST PAIN: Primary | ICD-10-CM

## 2022-05-10 DIAGNOSIS — K22.70 BARRETT'S ESOPHAGUS WITHOUT DYSPLASIA: ICD-10-CM

## 2022-05-10 DIAGNOSIS — E66.3 OVERWEIGHT: ICD-10-CM

## 2022-05-10 DIAGNOSIS — K59.01 SLOW TRANSIT CONSTIPATION: ICD-10-CM

## 2022-05-10 PROCEDURE — 1036F TOBACCO NON-USER: CPT | Performed by: INTERNAL MEDICINE

## 2022-05-10 PROCEDURE — 99214 OFFICE O/P EST MOD 30 MIN: CPT | Performed by: INTERNAL MEDICINE

## 2022-05-10 NOTE — PATIENT INSTRUCTIONS
Your next upper endoscopy for your Barretts esophagus will be September of 2024  Your next colonoscopy for your history of tubular adenomas will be in September of 2026

## 2022-05-10 NOTE — PROGRESS NOTES
[facility] - Outpatient Follow-up Note  Stone Moore 64 y o  female MRN: [id number]  Encounter: [id number]          ASSESSMENT AND PLAN:      1  Other chest pain  Has been started on blood pressure medications and has noted improvement additionally when she takes Bentyl which improves her generalized abdominal discomfort in contractions that improves her chest pain also    2  GERD without esophagitis  Controlled on esomeprazole 40 mg a day  3  Christianson's esophagus without dysplasia  Surveillance due in September October of 2024    4  Slow transit constipation  Controlled with senna as needed    5  Hx of adenomatous colonic polyps  Next colonoscopy September of 2026    6  Generalized abdominal pain  Improved with Bentyl  Likely components of irritable bowel syndrome  Discussed stress management  7  Overweight  She will work on portion control and slowly work on her exercise tolerance  She will otherwise follow-up in a year     ______________________________________________________________________    SUBJECTIVE:  Very pleasant 61-year-old female history of reflux Barretts in adenomatous colon polyps surveillance is up-to-date  She does have some underlying abdominal discomfort brought on by stress and this makes her stress worse  This is improved with as needed Bentyl  We discussed stress management  She likewise has seen the cardiologist and has been started on blood pressure medication in doing better regarding her atypical chest pain  She did have a CT scan of the chest abdomen and pelvis which were unremarkable  Constipation is controlled with as needed Senokot  We discussed gradually increasing her exercise capabilities  REVIEW OF SYSTEMS IS OTHERWISE NEGATIVE        Historical Information   Past Medical History:   Diagnosis Date    Abdominal pain     sometimes    Arthritis     Back pain     Christianson esophagus     Breast lump     last assessed 8/24/10    Chest pain     Colon polyp     Constipation     Diarrhea     Difficulty concentrating     Dizziness     Fatigue     in the AM    GERD (gastroesophageal reflux disease)     Headache     Hearing problem     Hepatitis     Hiatal hernia     Hyperlipemia     Hyperlipidemia     Hypertension     Infectious viral hepatitis     Hepatitis B    Lack of energy     Lack of energy     Myalgia     last assessed  1/29/15    Myositis     last assessed  1/29/15    Nausea     Numbness and tingling     Palpitations     Palpitations     Pernicious anemia     Post-menopausal     Rash     sometimes on abdomen    Tinnitus     Wheezing      Past Surgical History:   Procedure Laterality Date    CHOLECYSTECTOMY      COLONOSCOPY      EGD      HERNIA REPAIR  1965    KNEE ARTHROSCOPY Right     meniscus    MA KNEE SCOPE,SINGLE MENISECTOMY Right 06/03/2019    Procedure: RIGHT KNEE ARTHROSCOPIC PARTIAL MEDIAL MENISCECTOMY; CHONDROPLASTY;  Surgeon: Afsaneh Peralta MD;  Location: [facility];  Service: Orthopedics    TUBAL LIGATION      URETHRAL SLING      VAGINA SURGERY  2014    Vaginal sling    WISDOM TOOTH EXTRACTION       Social History   Social History     Substance and Sexual Activity   Alcohol Use Yes    Comment: socially     Social History     Substance and Sexual Activity   Drug Use No     Social History     Tobacco Use   Smoking Status Former Smoker   Smokeless Tobacco Never Used   Tobacco Comment    Quit at age 32     Family History   Problem Relation Age of Onset    Atrial fibrillation Mother    [facility] Breast cancer Mother 61    Hypertension Mother     Cancer Mother     Stroke Mother     Arthritis Mother     Hyperlipidemia Mother     Hypertension Father     Hyperlipidemia Father     Sudden death Father     Aneurysm Sister         abdominal aortic    Hypertension Sister     Hypertension Sister     Cancer Maternal Grandmother     No Known Problems Daughter     Breast cancer Maternal Aunt 61    No Known Problems Maternal Aunt        Meds/Allergies       Current Outpatient Medications:     dicyclomine (BENTYL) 10 mg capsule    docusate sodium (COLACE) 100 mg capsule    esomeprazole (NexIUM) 40 MG capsule    metoprolol succinate (Toprol XL) 25 mg 24 hr tablet    montelukast (SINGULAIR) 10 mg tablet    Allergies   Allergen Reactions    Lactose - Food Allergy GI Intolerance           Objective     Blood pressure 136/89, pulse 78, height 5' 4" (1 626 m), weight 90 7 kg (200 lb), last menstrual period 01/01/2016  Body mass index is 34 33 kg/m²  PHYSICAL EXAM:      General Appearance:   Alert, cooperative, no distress   HEENT:   Normocephalic, atraumatic, anicteric      Neck:  Supple, symmetrical, trachea midline   Lungs:   Clear to auscultation bilaterally; no rales, rhonchi or wheezing; respirations unlabored    Heart[de-identified]   Regular rate and rhythm; no murmur, rub, or gallop  Abdomen:   Soft, non-tender, non-distended; normal bowel sounds; no masses, no organomegaly    Genitalia:   Deferred    Rectal:   Deferred    Extremities:  No cyanosis, clubbing or edema    Pulses:  2+ and symmetric    Skin:  No jaundice, rashes, or lesions    Lymph nodes:  No palpable cervical lymphadenopathy        Lab Results:   No visits with results within 1 Day(s) from this visit     Latest known visit with results is:   Hospital Outpatient Visit on 02/21/2022   Component Date Value    LA size 02/21/2022 4 1     Triscuspid Valve Regurgi* 02/21/2022 17 0     Tricuspid valve peak reg* 02/21/2022 2 07     LVPWd 02/21/2022 0 90     Left Atrium Area-systoli* 02/21/2022 15 2     MV E' Tissue Velocity Se* 02/21/2022 6     MV E' Tissue Velocity La* 02/21/2022 8     TR Peak Garo 02/21/2022 2 1     IVSd 02/21/2022 4 42     LV DIASTOLIC VOLUME (MOD* 66/17/8496 111     LEFT VENTRICLE SYSTOLIC * 47/09/6323 47     Left ventricular stroke * 02/21/2022 64 00     LVIDd 02/21/2022 4 90     IVS 02/21/2022 1 1     LVIDS 02/21/2022 3 40     FS 02/21/2022 31     Ao root 02/21/2022 3 30     RVID d 02/21/2022 2 8     MV valve area p 1/2 meth* 02/21/2022 3 44     E wave deceleration time 02/21/2022 222     MV Peak E Garo 02/21/2022 56     MV Peak A Garo 02/21/2022 0 8     MV stenosis pressure 1/2* 02/21/2022 64     LVSV, 2D 02/21/2022 64     ZLVPWD 02/21/2022 1 17     ZLVIDS 02/21/2022 -0 81     ZLVIDD 02/21/2022 -2 27     ZIVSD 02/21/2022 2 73     LV EF 02/21/2022 58          Radiology Results:   No results found

## 2022-06-14 ENCOUNTER — NURSE TRIAGE (OUTPATIENT)
Dept: OTHER | Facility: OTHER | Age: 62
End: 2022-06-14

## 2022-06-14 DIAGNOSIS — R14.0 ABDOMINAL BLOATING: Primary | ICD-10-CM

## 2022-06-14 DIAGNOSIS — R11.0 NAUSEA: ICD-10-CM

## 2022-06-14 NOTE — TELEPHONE ENCOUNTER
Regarding: intestine pains, bloating and fullness, neck pressure w/neuro issues developing  ----- Message from Shania Campbell sent at 6/14/2022  2:18 PM EDT -----  "I am getting intestine pains after eating and drinking; I feel full and bloated and then I get pressure in my neck which turns into neurological impairment and I wonder if my stomach issues are bringing on those symptoms  Things just don't feel like they are moving correctly and I am wondering if the doctor should order me a gastric clearing study  "

## 2022-06-14 NOTE — TELEPHONE ENCOUNTER
Patient post OV 5/10/22 and reports symptoms started up soon after  Patient reports quick filling at mealtime with lower abdominal pressure increasing where the patient has to stop eating and has decreased PO intake for relief of symptoms  Patient has some questions and concerns for the provider:  1  Could she try the Gastric Emptying study? 2  Would the intestinal bend provider mention have anything to do with patient's symptoms? 3  Having problems with Bentyl so she stopped taking it; reports it makes her head feel worse and she was making mistakes at work,    Please follow up with patient  Reason for Disposition   MODERATE pain (e g , interferes with normal activities that comes and goes (cramps) lasts > 24 hours (Exception: pain with Vomiting or Diarrhea - see that Protocol)    Answer Assessment - Initial Assessment Questions  1  LOCATION: "Where does it hurt?"       Whole intestinal pain; lower abdomen pressure    2  RADIATION: "Does the pain shoot anywhere else?" (e g , chest, back)     To back    3  ONSET: "When did the pain begin?" (e g , minutes, hours or days ago)       Past month post OV    4  SUDDEN: "Gradual or sudden onset?"      Sudden    5  PATTERN "Does the pain come and go, or is it constant?"     - If constant: "Is it getting better, staying the same, or worsening?"       (Note: Constant means the pain never goes away completely; most serious pain is constant and it progresses)      - If intermittent: "How long does it last?" "Do you have pain now?"      (Note: Intermittent means the pain goes away completely between bouts)      Constant    6   SEVERITY: "How bad is the pain?"  (e g , Scale 1-10; mild, moderate, or severe)       - MILD (1-3): doesn't interfere with normal activities, abdomen soft and not tender to touch     - MODERATE (4-7): interferes with normal activities or awakens from sleep, tender to touch     - SEVERE (8-10): excruciating pain, doubled over, unable to do any normal activities       Moderate    7  RECURRENT SYMPTOM: "Have you ever had this type of stomach pain before?" If Yes, ask: "When was the last time?" and "What happened that time?"       Yes    8  CAUSE: "What do you think is causing the stomach pain?"      Unknown    9  RELIEVING/AGGRAVATING FACTORS: "What makes it better or worse?" (e g , movement, antacids, bowel movement)     Eating aggravates symptoms    10  OTHER SYMPTOMS: "Has there been any vomiting, diarrhea, constipation, or urine problems?"       Fullness and bloating; ears ring, pressure in neck that leads to neurological impairment (makes mistakes on her job)    11   PREGNANCY: "Is there any chance you are pregnant?" "When was your last menstrual period?"        Denies    Protocols used: ABDOMINAL PAIN - [facility] - KRISTYN ONEILL

## 2022-06-14 NOTE — TELEPHONE ENCOUNTER
Spoke to patient, she reports abdominal bloating started after she increased fiber with green smoothies, garbanzo beans  Discussed she may have increased fiber too quickly which caused these symptoms  She should decrease fiber and increase it slowly  Can trial low fodmap diet  She feels Bentyl was helping abdominal pain but made her forgetful so she stopped  Advised Bentyl can cause drowsiness, dizziness and she can try OTC IBgard instead  Gastric emptying study was ordered for further evaluation of bloating, nausea per patient request  She reports she's taking Nexium for acid suppression  Discussed if symptoms persist she should make apt to be seen in the office

## 2022-07-07 ENCOUNTER — HOSPITAL ENCOUNTER (OUTPATIENT)
Dept: RADIOLOGY | Facility: HOSPITAL | Age: 62
Discharge: HOME | End: 2022-07-07
Payer: COMMERCIAL

## 2022-07-07 DIAGNOSIS — R11.0 NAUSEA: ICD-10-CM

## 2022-07-07 DIAGNOSIS — R14.0 ABDOMINAL BLOATING: ICD-10-CM

## 2022-07-07 PROCEDURE — A9541 TC99M SULFUR COLLOID: HCPCS

## 2022-07-07 PROCEDURE — G1004 CDSM NDSC: HCPCS

## 2022-07-07 PROCEDURE — 78264 GASTRIC EMPTYING IMG STUDY: CPT

## 2022-07-15 ENCOUNTER — NURSE TRIAGE (OUTPATIENT)
Dept: OTHER | Facility: OTHER | Age: 62
End: 2022-07-15

## 2022-07-15 PROCEDURE — 99283 EMERGENCY DEPT VISIT LOW MDM: CPT

## 2022-07-16 ENCOUNTER — HOSPITAL ENCOUNTER (EMERGENCY)
Facility: HOSPITAL | Age: 62
Discharge: HOME | End: 2022-07-16
Attending: EMERGENCY MEDICINE | Admitting: EMERGENCY MEDICINE
Payer: COMMERCIAL

## 2022-07-16 ENCOUNTER — APPOINTMENT (EMERGENCY)
Dept: RADIOLOGY | Facility: HOSPITAL | Age: 62
End: 2022-07-16
Payer: COMMERCIAL

## 2022-07-16 VITALS
HEIGHT: 64 IN | DIASTOLIC BLOOD PRESSURE: 80 MMHG | SYSTOLIC BLOOD PRESSURE: 134 MMHG | WEIGHT: 200 LBS | RESPIRATION RATE: 18 BRPM | OXYGEN SATURATION: 98 % | HEART RATE: 87 BPM | TEMPERATURE: 99.8 F | BODY MASS INDEX: 34.15 KG/M2

## 2022-07-16 DIAGNOSIS — U07.1 COVID: Primary | ICD-10-CM

## 2022-07-16 DIAGNOSIS — R07.89 BURNING CHEST PAIN: ICD-10-CM

## 2022-07-16 PROCEDURE — 71045 X-RAY EXAM CHEST 1 VIEW: CPT

## 2022-07-16 PROCEDURE — 99284 EMERGENCY DEPT VISIT MOD MDM: CPT | Performed by: EMERGENCY MEDICINE

## 2022-07-16 RX ORDER — GUAIFENESIN/DEXTROMETHORPHAN 100-10MG/5
10 SYRUP ORAL ONCE
Status: COMPLETED | OUTPATIENT
Start: 2022-07-16 | End: 2022-07-16

## 2022-07-16 RX ORDER — NAPROXEN 500 MG/1
500 TABLET ORAL ONCE
Status: COMPLETED | OUTPATIENT
Start: 2022-07-16 | End: 2022-07-16

## 2022-07-16 RX ORDER — ONDANSETRON 4 MG/1
4 TABLET, ORALLY DISINTEGRATING ORAL EVERY 6 HOURS PRN
Qty: 20 TABLET | Refills: 0 | Status: SHIPPED | OUTPATIENT
Start: 2022-07-16 | End: 2022-10-03

## 2022-07-16 RX ORDER — PSEUDOEPHEDRINE HCL 30 MG
60 TABLET ORAL EVERY 8 HOURS PRN
Qty: 30 TABLET | Refills: 0 | Status: SHIPPED | OUTPATIENT
Start: 2022-07-16 | End: 2022-10-03

## 2022-07-16 RX ORDER — NAPROXEN 500 MG/1
500 TABLET ORAL 2 TIMES DAILY WITH MEALS
Qty: 30 TABLET | Refills: 0 | Status: SHIPPED | OUTPATIENT
Start: 2022-07-16

## 2022-07-16 RX ORDER — DEXTROMETHORPHAN POLISTIREX 30 MG/5ML
10 SUSPENSION ORAL 2 TIMES DAILY
Qty: 148 ML | Refills: 0 | Status: SHIPPED | OUTPATIENT
Start: 2022-07-16 | End: 2022-10-03

## 2022-07-16 RX ADMIN — GUAIFENESIN AND DEXTROMETHORPHAN 10 ML: 100; 10 SYRUP ORAL at 03:54

## 2022-07-16 RX ADMIN — NAPROXEN 500 MG: 500 TABLET ORAL at 03:53

## 2022-07-16 NOTE — TELEPHONE ENCOUNTER
Reason for Disposition   Chest pain or pressure    Answer Assessment - Initial Assessment Questions  Were you within 6 feet or less, for up to 15 minutes or more with a person that has a confirmed COVID-19 test?                   What was the date of your exposure? Are you experiencing any symptoms attributed to the virus?  (Assess for SOB, cough, fever, difficulty breathing)            Symptoms 7/15  COVID Positive 7/15  Cough, nasal congestion  Sore throat  Wheezing (mild), "burning lungs" chest pain  intermittent with inhalation          HIGH RISK: Do you have any history heart or lung conditions, weakened immune system, diabetes, Asthma, CHF, HIV, COPD, Chemo, renal failure, sickle cell, etc?   HTN    Protocols used: CORONAVIRUS (COVID-19) DIAGNOSED OR SUSPECTED-ADULT-

## 2022-07-16 NOTE — TELEPHONE ENCOUNTER
Regarding: COVID medication request   ----- Message from Amber Dominguez sent at 7/15/2022 11:07 PM EDT -----  "I tested positive for covid today, my son has had covid all week   i seem to be coughing a lot and have burning in my lungs, I was wondering if he could prescribe me something "

## 2022-07-16 NOTE — ED PROVIDER NOTES
Final Diagnosis:  1  COVID    2  Burning chest pain        Chief Complaint   Patient presents with    Flu Symptoms     Patient comes tonight due to seeking medication to help her covid symptoms, tested positive yesterday, has a cough, and told PCP that her chest is burning due to coughing, PCP told her to come get an xray     HPI  65 yo comes because she has covid and is symptomatic  Positive home test yesterday  Has cough, myalgias and burning, fatigue  Discussed with PCP sent to ED for eval satting on room air  Borderline febrile  Trying some combo meds  Speaking full sentences  no retraction  satting on room air  Cough nonproductive  - No language barrier    - History obtained from patient  - There are no limitations to the history obtained  - Previous charting underwent limited review with attention to last ED visits, labs, ekgs, and prior imaging  PMH:   has a past medical history of Abdominal pain, Arthritis, Back pain, Christianson esophagus, Breast lump, Chest pain, Colon polyp, Constipation, Diarrhea, Difficulty concentrating, Dizziness, Fatigue, GERD (gastroesophageal reflux disease), Headache, Hearing problem, Hepatitis, Hiatal hernia, Hyperlipemia, Hyperlipidemia, Hypertension, Infectious viral hepatitis, Lack of energy, Lack of energy, Myalgia, Myositis, Nausea, Numbness and tingling, Palpitations, Palpitations, Pernicious anemia, Post-menopausal, Rash, Tinnitus, and Wheezing  PSH:   has a past surgical history that includes Cholecystectomy; Tubal ligation; Urethral sling; Colonoscopy; EGD; pr knee scope,single menisectomy (Right, 06/03/2019); Knee arthroscopy (Right); [place] tooth extraction; Hernia repair (1965); and Vagina surgery (2014)       Social History:    Tobacco Use: Medium Risk    Smoking Tobacco Use: Former Smoker    Smokeless Tobacco Use: Never Used     Alcohol Use: Not on file     Patient with no illicit use    ROS:    Pertinent positives/negatives:   Review of Systems Constitutional: Positive for chills, fatigue and fever  Respiratory: Positive for cough  Negative for shortness of breath  Musculoskeletal: Positive for myalgias  CONSTITUTIONAL:  No dizziness  No weakness  No unexpected weight loss  EYES:  No pain, erythema, or discharge  No loss of vision  ENT:  No tinnitus, decreased hearing  No epistaxis/purulent drainage  No voice change, airway closing, trismus  CARDIOVASCULAR:  No chest pain  No palpitations  No new lower extremity edema  RESPIRATORY:  No purulent cough  No hemoptysis  No dyspnea  No paroxysmal nocturnal dyspnea  No stridor, audible wheezing bedside  GASTROINTESTINAL:  Normal appetite  No vomiting, diarrhea  No pain  No bloating  No melena  GENITOURINARY:  No frequency, urgency, nocturia  No hematuria or dysuria  No discharge  No sores/adenopathy  MUSCULOSKELETAL:  No arthralgias or myalgias that are new  INTEGUMENTARY:  No swelling  No unexpected contusions  No abrasions  No lymphangitis  NEUROLOGIC:  No meningismus  No numbness of the extremities  No new focal weakness  No postural instability  PSYCHIATRIC:  No SI HI AVH  HEMATOLOGICAL:  No bleeding  No petechiae  No bruising  ALLERGIES:  No urticaria  No sudden abd cramping  No stridor  PE:     Physical exam highlights:   Physical Exam       Vitals:    07/16/22 0246 07/16/22 0357   BP: 134/80    BP Location: Right arm    Pulse: 88 87   Resp: 20 18   Temp: 99 8 °F (37 7 °C)    TempSrc: Tympanic    SpO2: 98% 98%   Weight: 90 7 kg (200 lb)    Height: 5' 4" (1 626 m)      Vitals reviewed by me  Nursing note reviewed  Chaperone present for all sensitive exam   Const: No acute distress  Alert  Nontoxic  Not diaphoretic  HEENT: External ears normal  No protrusion drainage swelling  Nose normal  No drainage/traumatic deformity  MMM  Mouth with baseline/symmetric movement  No trismus  Eyes: No squinting  No icterus  Tracks through the room with normal EOM   No tearing/swelling/drainage  Neck: ROM normal  No rigidity  No meningismus  Cards: Rate as per vitals  Compared to monitor sinus unless documented above  Regular  Well perfused  Pulm: able to verbalize without additional effort  Effort and excursion normal  No disress  No audible wheezing/ stridor  Normal resp rate  Abd: No distension beyond baseline  No fluctuant wave  Patient without peritoneal pain with shifting/bumping the bed  MSK: ROM normal and baseline  No deformity  Skin: No new rashes visible  Well perfused  Neuro: Nonfocal  Baseline  CN grossly intact  Moving all four with coordination  Psych: Normal behavior and affect  A:  - Nursing note reviewed  Ddx and MDM  Symptoms consistent with covid                        XR chest 1 view portable   Final Result      No acute cardiopulmonary disease  Workstation performed: [id number]           Orders Placed This Encounter   Procedures    XR chest 1 view portable     Labs Reviewed - No data to display    Final Diagnosis:  1  COVID    2  Burning chest pain        P:  - hospital tx includes   Medications   dextromethorphan-guaiFENesin (ROBITUSSIN DM) oral syrup 10 mL (10 mL Oral Given 7/16/22 0354)   naproxen (NAPROSYN) tablet 500 mg (500 mg Oral Given 7/16/22 0353)         - disposition  Time reflects when diagnosis was documented in both MDM as applicable and the Disposition within this note     Time User Action Codes Description Comment    7/16/2022  3:28 AM Nasra Trejo Add [U07 1] COVID     7/16/2022  3:28 AM Nasra Trejo Add [R07 89] Burning chest pain       ED Disposition     ED Disposition   Discharge    Condition   Stable    Date/Time   Sat Jul 16, 2022  3:28 AM    Comment   Kizzy Moore discharge to home/self care                 Follow-up Information     Follow up With Specialties Details Why Contact Info    Froylan Joe, DO Family Medicine Schedule an appointment as soon as possible for a visit  discuss covid treatments [id number] [facility]  [place] [address]  [phone]            - patient will call their PCP to let them know they were in the emergency department  We discuss return precautions       - additional tx intended, if consistent with primary provider:  - patient to follow with :      Discharge Medication List as of 7/16/2022  3:51 AM      START taking these medications    Details   Dextromethorphan Polistirex ER 30 MG/5ML SUER Take 10 mL (60 mg total) by mouth 2 (two) times a day, Starting Sat 7/16/2022, Normal      naproxen (Naprosyn) 500 mg tablet Take 1 tablet (500 mg total) by mouth 2 (two) times a day with meals, Starting Sat 7/16/2022, Normal      ondansetron (Zofran ODT) 4 mg disintegrating tablet Take 1 tablet (4 mg total) by mouth every 6 (six) hours as needed for nausea or vomiting, Starting Sat 7/16/2022, Normal      pseudoephedrine (SUDAFED) 30 mg tablet Take 2 tablets (60 mg total) by mouth every 8 (eight) hours as needed for congestion, Starting Sat 7/16/2022, Normal         CONTINUE these medications which have NOT CHANGED    Details   dicyclomine (BENTYL) 10 mg capsule Take 1 capsule (10 mg total) by mouth 4 (four) times a day (before meals and at bedtime), Starting Tue 1/11/2022, Normal      docusate sodium (COLACE) 100 mg capsule Take 1 capsule (100 mg total) by mouth 2 (two) times a day, Starting Tue 1/11/2022, Normal      esomeprazole (NexIUM) 40 MG capsule TAKE ONE CAPSULE BY MOUTH TWICE A DAY, Normal      metoprolol succinate (Toprol XL) 25 mg 24 hr tablet Take 1 tablet (25 mg total) by mouth daily, Starting Mon 1/10/2022, Normal      montelukast (SINGULAIR) 10 mg tablet TAKE ONE TABLET BY MOUTH EVERY DAY AT BEDTIME, Normal           No discharge procedures on file  Prior to Admission Medications   Prescriptions Last Dose Informant Patient Reported?  Taking?   dicyclomine (BENTYL) 10 mg capsule  Self No No   Sig: Take 1 capsule (10 mg total) by mouth 4 (four) times a day (before meals and at bedtime)   Patient taking differently: Take 10 mg by mouth 4 (four) times a day (before meals and at bedtime) PRN    docusate sodium (COLACE) 100 mg capsule  Self No No   Sig: Take 1 capsule (100 mg total) by mouth 2 (two) times a day   Patient taking differently: Take 100 mg by mouth 2 (two) times a day PRN    esomeprazole (NexIUM) 40 MG capsule  Self No No   Sig: TAKE ONE CAPSULE BY MOUTH TWICE A DAY   metoprolol succinate (Toprol XL) 25 mg 24 hr tablet  Self No No   Sig: Take 1 tablet (25 mg total) by mouth daily   montelukast (SINGULAIR) 10 mg tablet  Self No No   Sig: TAKE ONE TABLET BY MOUTH EVERY DAY AT BEDTIME      Facility-Administered Medications: None       Portions of the record may have been created with voice recognition software  Occasional wrong word or "sound a like" substitutions may have occurred due to the inherent limitations of voice recognition software  Read the chart carefully and recognize, using context, where substitutions have occurred      Electronically signed by:  MD Shonda Parekh MD  07/17/22 7220

## 2022-07-16 NOTE — TELEPHONE ENCOUNTER
Symptomatic  Symptoms 7/15  COVID Positive 7/15  Cough, nasal congestion  Sore throat  Wheezing (mild), "burning lungs" pain  intermittent with inhalation  Recommended medical evaluation due to severity of symptoms  Verbalized understanding and agreeable with disposition  No further question

## 2022-07-18 DIAGNOSIS — I10 PRIMARY HYPERTENSION: ICD-10-CM

## 2022-07-20 ENCOUNTER — TELEMEDICINE (OUTPATIENT)
Dept: FAMILY MEDICINE CLINIC | Facility: CLINIC | Age: 62
End: 2022-07-20
Payer: COMMERCIAL

## 2022-07-20 DIAGNOSIS — U07.1 COVID-19: Primary | ICD-10-CM

## 2022-07-20 PROCEDURE — 99213 OFFICE O/P EST LOW 20 MIN: CPT | Performed by: FAMILY MEDICINE

## 2022-07-20 RX ORDER — METOPROLOL SUCCINATE 25 MG/1
TABLET, EXTENDED RELEASE ORAL
Qty: 90 TABLET | Refills: 1 | Status: SHIPPED | OUTPATIENT
Start: 2022-07-20 | End: 2022-07-25

## 2022-07-20 NOTE — TELEPHONE ENCOUNTER
She has multiple specialists  She can return for a 6 month follow-up  She has been having some issues addressed by GI    Refill of Toprol XL provided

## 2022-07-20 NOTE — PROGRESS NOTES
COVID-19 Outpatient Progress Note    Assessment/Plan:    Problem List Items Addressed This Visit    None     Visit Diagnoses     COVID-19    -  Primary         Disposition:     I recommended self-quarantine for 10 days and to watch for symptoms until 14 days after exposure  If patient were to develop symptoms, they should self isolate and call our office for further guidance  Discussed symptom directed medication options with patient  Discussed vitamin D, vitamin C, and/or zinc supplementation with patient  I have spent 11 minutes directly with the patient  Greater than 50% of this time was spent in counseling/coordination of care regarding: prognosis, risks and benefits of treatment options, instructions for management and patient and family education  Advised on supportive care measures for COVID-19 infection    -patient will be given work excuse  She works as a , is still sick with symptoms including gastrointestinal symptoms and diarrhea and 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  Patient will be excused from work from July 16, 2022 and will be able to return to work on July 25, 2022 so long as she is feeling better    -advised patient to contact the office for any worsening respiratory symptoms such as dyspnea on exertion or severe shortness of breath       Encounter provider Doug Eason DO    Provider located at [address] [place] [phone]    Recent Visits  No visits were found meeting these conditions  Showing recent visits within past 7 days and meeting all other requirements  Today's Visits  Date Type Provider Dept   07/20/22 Telemedicine Doug Eason DO [facility]   Showing today's visits and meeting all other requirements  Future Appointments  No visits were found meeting these conditions    Showing future appointments within next 150 days and meeting all other requirements     This virtual check-in was done via [employer]! and patient was informed that this is a secure, HIPAA-compliant platform  She agrees to proceed  Patient agrees to participate in a virtual check in via telephone or video visit instead of presenting to the office to address urgent/immediate medical needs  Patient is aware this is a billable service  After connecting through [place], the patient was identified by name and date of birth  Carol Duran was informed that this was a telemedicine visit and that the exam was being conducted confidentially over secure lines  My office door was closed  No one else was in the room  Carol Duran acknowledged consent and understanding of privacy and security of the telemedicine visit  I informed the patient that I have reviewed her record in Epic and presented the opportunity for her to ask any questions regarding the visit today  The patient agreed to participate  Verification of patient location:  Patient is located in the following state in which I hold an active license: [place]    Subjective:   Carol Duran is a 64 y o  female who is concerned about COVID-19  Patient's symptoms include chills, fatigue, malaise, diarrhea and myalgias  Patient denies fever, congestion, rhinorrhea, sore throat, anosmia, loss of taste, cough, shortness of breath, chest tightness, abdominal pain, nausea, vomiting and headaches       - Date of symptom onset: 7/15/2022      COVID-19 vaccination status: Fully vaccinated with booster    Exposure:   Contact with a person who is under investigation (PUI) for or who is positive for COVID-19 within the last 14 days?: No    Hospitalized recently for fever and/or lower respiratory symptoms?: No      Currently a healthcare worker that is involved in direct patient care?: No      Works in a special setting where the risk of COVID-19 transmission may be high? (this may include long-term care, correctional and snf facilities; homeless shelters; assisted-living facilities and group homes ): No      Resident in a special setting where the risk of COVID-19 transmission may be high? (this may include long-term care, correctional and jail facilities; homeless shelters; assisted-living facilities and group homes ): No      Lab Results   Component Value Date    SARSCOV2 NOT DETECTED 09/11/2021     Past Medical History:   Diagnosis Date    Abdominal pain     sometimes    Arthritis     Back pain     Christianson esophagus     Breast lump     last assessed 8/24/10    Chest pain     Colon polyp     Constipation     Diarrhea     Difficulty concentrating     Dizziness     Fatigue     in the AM    GERD (gastroesophageal reflux disease)     Headache     Hearing problem     Hepatitis     Hiatal hernia     Hyperlipemia     Hyperlipidemia     Hypertension     Infectious viral hepatitis     Hepatitis B    Lack of energy     Lack of energy     Myalgia     last assessed  1/29/15    Myositis     last assessed  1/29/15    Nausea     Numbness and tingling     Palpitations     Palpitations     Pernicious anemia     Post-menopausal     Rash     sometimes on abdomen    Tinnitus     Wheezing      Past Surgical History:   Procedure Laterality Date    CHOLECYSTECTOMY      COLONOSCOPY      EGD      HERNIA REPAIR  1965    KNEE ARTHROSCOPY Right     meniscus    AR KNEE SCOPE,SINGLE MENISECTOMY Right 06/03/2019    Procedure: RIGHT KNEE ARTHROSCOPIC PARTIAL MEDIAL MENISCECTOMY; CHONDROPLASTY;  Surgeon: Mortimer Mathieu, MD;  [facility];  Service: Orthopedics    TUBAL LIGATION      URETHRAL SLING      VAGINA SURGERY  2014    Vaginal sling    WISDOM TOOTH EXTRACTION       Current Outpatient Medications   Medication Sig Dispense Refill    Dextromethorphan Polistirex ER 30 MG/5ML SUER Take 10 mL (60 mg total) by mouth 2 (two) times a day 148 mL 0    dicyclomine (BENTYL) 10 mg capsule Take 1 capsule (10 mg total) by mouth 4 (four) times a day (before meals and at bedtime) (Patient taking differently: Take 10 mg by mouth 4 (four) times a day (before meals and at bedtime) PRN ) 120 capsule 2    docusate sodium (COLACE) 100 mg capsule Take 1 capsule (100 mg total) by mouth 2 (two) times a day (Patient taking differently: Take 100 mg by mouth 2 (two) times a day PRN ) 60 capsule 2    esomeprazole (NexIUM) 40 MG capsule TAKE ONE CAPSULE BY MOUTH TWICE A DAY 60 capsule 2    metoprolol succinate (TOPROL-XL) 25 mg 24 hr tablet TAKE ONE TABLET BY MOUTH EVERY DAY 90 tablet 1    montelukast (SINGULAIR) 10 mg tablet TAKE ONE TABLET BY MOUTH EVERY DAY AT BEDTIME 30 tablet 1    naproxen (Naprosyn) 500 mg tablet Take 1 tablet (500 mg total) by mouth 2 (two) times a day with meals 30 tablet 0    ondansetron (Zofran ODT) 4 mg disintegrating tablet Take 1 tablet (4 mg total) by mouth every 6 (six) hours as needed for nausea or vomiting 20 tablet 0    pseudoephedrine (SUDAFED) 30 mg tablet Take 2 tablets (60 mg total) by mouth every 8 (eight) hours as needed for congestion 30 tablet 0     No current facility-administered medications for this visit  Allergies   Allergen Reactions    Lactose - Food Allergy GI Intolerance       Review of Systems   Constitutional: Positive for chills and fatigue  Negative for fever  HENT: Negative for congestion, rhinorrhea and sore throat  Respiratory: Negative for cough, chest tightness and shortness of breath  Gastrointestinal: Positive for diarrhea  Negative for abdominal pain, nausea and vomiting  Musculoskeletal: Positive for myalgias  Neurological: Negative for headaches  Objective: There were no vitals filed for this visit  Physical Exam  Constitutional:       General: She is not in acute distress  Appearance: Normal appearance  She is well-developed  She is ill-appearing  She is not toxic-appearing or diaphoretic  HENT:      Head: Normocephalic and atraumatic  Eyes:      Extraocular Movements: Extraocular movements intact  Pupils: Pupils are equal, round, and reactive to light  Pulmonary:      Effort: Pulmonary effort is normal       Breath sounds: No wheezing  Neurological:      General: No focal deficit present  Mental Status: She is alert and oriented to person, place, and time  Psychiatric:         Mood and Affect: Mood normal          Behavior: Behavior normal          Thought Content: Thought content normal          Judgment: Judgment normal          VIRTUAL VISIT DISCLAIMER    Alfonzo Moore verbally agrees to participate in [employer]  Pt is aware that [employer] could be limited without vital signs or the ability to perform a full hands-on physical exam  Jacinta Moore understands she or the provider may request at any time to terminate the video visit and request the patient to seek care or treatment in person  Work excuse sent to [employer]  [email or web address] out 7/16/2022 through 7/24/2022, return on 7/25/2022

## 2022-08-04 ENCOUNTER — VBI (OUTPATIENT)
Dept: ADMINISTRATIVE | Facility: OTHER | Age: 62
End: 2022-08-04

## 2022-08-30 DIAGNOSIS — K21.9 GERD WITHOUT ESOPHAGITIS: ICD-10-CM

## 2022-08-31 ENCOUNTER — TELEPHONE (OUTPATIENT)
Dept: GASTROENTEROLOGY | Facility: CLINIC | Age: 62
End: 2022-08-31

## 2022-08-31 RX ORDER — ESOMEPRAZOLE MAGNESIUM 40 MG/1
40 CAPSULE, DELAYED RELEASE ORAL 2 TIMES DAILY
Qty: 60 CAPSULE | Refills: 2 | Status: SHIPPED | OUTPATIENT
Start: 2022-08-31

## 2022-10-03 ENCOUNTER — OFFICE VISIT (OUTPATIENT)
Dept: FAMILY MEDICINE CLINIC | Facility: CLINIC | Age: 62
End: 2022-10-03
Payer: COMMERCIAL

## 2022-10-03 VITALS
WEIGHT: 199 LBS | HEART RATE: 81 BPM | SYSTOLIC BLOOD PRESSURE: 122 MMHG | HEIGHT: 64 IN | DIASTOLIC BLOOD PRESSURE: 86 MMHG | OXYGEN SATURATION: 99 % | RESPIRATION RATE: 16 BRPM | BODY MASS INDEX: 33.97 KG/M2

## 2022-10-03 DIAGNOSIS — R51.9 FREQUENT HEADACHES: ICD-10-CM

## 2022-10-03 DIAGNOSIS — E78.2 MIXED HYPERLIPIDEMIA: Primary | ICD-10-CM

## 2022-10-03 DIAGNOSIS — Z12.31 ENCOUNTER FOR SCREENING MAMMOGRAM FOR BREAST CANCER: ICD-10-CM

## 2022-10-03 DIAGNOSIS — I10 PRIMARY HYPERTENSION: ICD-10-CM

## 2022-10-03 DIAGNOSIS — R00.2 PALPITATIONS: ICD-10-CM

## 2022-10-03 DIAGNOSIS — Z23 FLU VACCINE NEED: ICD-10-CM

## 2022-10-03 PROBLEM — K29.70 GASTRITIS: Status: RESOLVED | Noted: 2017-07-28 | Resolved: 2022-10-03

## 2022-10-03 PROBLEM — R35.0 URINARY FREQUENCY: Status: RESOLVED | Noted: 2018-07-11 | Resolved: 2022-10-03

## 2022-10-03 PROBLEM — S83.221A PERIPHERAL TEAR OF MEDIAL MENISCUS OF RIGHT KNEE AS CURRENT INJURY: Status: RESOLVED | Noted: 2019-05-10 | Resolved: 2022-10-03

## 2022-10-03 PROCEDURE — 90682 RIV4 VACC RECOMBINANT DNA IM: CPT | Performed by: FAMILY MEDICINE

## 2022-10-03 PROCEDURE — 99214 OFFICE O/P EST MOD 30 MIN: CPT | Performed by: FAMILY MEDICINE

## 2022-10-03 PROCEDURE — 90471 IMMUNIZATION ADMIN: CPT | Performed by: FAMILY MEDICINE

## 2022-10-03 RX ORDER — METOPROLOL SUCCINATE 25 MG/1
25 TABLET, EXTENDED RELEASE ORAL DAILY
Qty: 90 TABLET | Refills: 1 | Status: SHIPPED | OUTPATIENT
Start: 2022-10-03

## 2022-10-03 NOTE — ASSESSMENT & PLAN NOTE
Patient does complain of daily headaches  Sometimes they are worse than others  Not classical migraine  Possibly some atypical migraine headaches    I did recommend that she have vision examination with her eye health specialist and will also provide referral to [facility] migraine specialist

## 2022-10-03 NOTE — ASSESSMENT & PLAN NOTE
Once again the patient's hypertension remains very well controlled on Toprol XL 25 mg once daily  Continue same dosage  Refill provided    Re-evaluate in 6 months that of RA that is enough

## 2022-10-03 NOTE — PROGRESS NOTES
Subjective:      Patient ID: Naomy Merchant is a 58 y o  female  51-year-old female presents for refill of Toprol XL  Patient comes in for blood pressure check  She has periodically checked her blood pressure readings at home which have also been in the normotensive range  Patient states that in the summertime she was having episodes of dizziness especially when she was working in hotter weather  She does try to drink adequate amount of water while she is working  She was having daily headaches which were worse in the summertime  A time she would notice that she would have to put on her glasses and occasionally take a Tylenol which did help  She had complained of headaches to be allergist when she was seen several years ago in allergist had made recommendation to be seen at migraine Clinic but she never got referred  Still getting headaches which can come from the back of her neck and up her head and at other times at the front of her head  No associated nausea or vomiting  She has gotten used to the headaches  Has not had eye examination in some time    Patient did have COVID-19 infection back in July      Past Medical History:   Diagnosis Date    Abdominal pain     sometimes    Arthritis     Back pain     Christianson esophagus     Breast lump     last assessed 8/24/10    Chest pain     Colon polyp     Constipation     Diarrhea     Difficulty concentrating     Dizziness     Fatigue     in the AM    Gastritis 7/28/2017    GERD (gastroesophageal reflux disease)     Headache     Hearing problem     Hepatitis     Hiatal hernia     Hyperlipemia     Hyperlipidemia     Hypertension     Infectious viral hepatitis     Hepatitis B    Lack of energy     Lack of energy     Myalgia     last assessed  1/29/15    Myositis     last assessed  1/29/15    Nausea     Numbness and tingling     Palpitations     Palpitations     Pernicious anemia     Post-menopausal     Rash     sometimes on abdomen    Tinnitus     Wheezing        Family History   Problem Relation Age of Onset    Atrial fibrillation Mother     Breast cancer Mother 61    Hypertension Mother     Cancer Mother     Stroke Mother     Arthritis Mother     Hyperlipidemia Mother     Hypertension Father     Hyperlipidemia Father    [facility] Sudden death Father     Aneurysm Sister         abdominal aortic    Hypertension Sister     Hypertension Sister     Cancer Maternal Grandmother     No Known Problems Daughter     Breast cancer Maternal Aunt 61    No Known Problems Maternal Aunt        Past Surgical History:   Procedure Laterality Date    CHOLECYSTECTOMY      COLONOSCOPY      EGD      HERNIA REPAIR  1965    KNEE ARTHROSCOPY Right     meniscus    MS KNEE SCOPE,SINGLE MENISECTOMY Right 06/03/2019    Procedure: RIGHT KNEE ARTHROSCOPIC PARTIAL MEDIAL MENISCECTOMY; CHONDROPLASTY;  Surgeon: Severiano Coronel MD;  [facility];  Service: Orthopedics    TUBAL LIGATION      URETHRAL SLING      VAGINA SURGERY  2014    Vaginal sling    WISDOM TOOTH EXTRACTION          reports that she has quit smoking  She has never used smokeless tobacco  She reports current alcohol use  She reports that she does not use drugs        Current Outpatient Medications:     dicyclomine (BENTYL) 10 mg capsule, Take 1 capsule (10 mg total) by mouth 4 (four) times a day (before meals and at bedtime) (Patient taking differently: Take 10 mg by mouth 4 (four) times a day (before meals and at bedtime) PRN), Disp: 120 capsule, Rfl: 2    esomeprazole (NexIUM) 40 MG capsule, Take 1 capsule (40 mg total) by mouth 2 (two) times a day, Disp: 60 capsule, Rfl: 2    metoprolol succinate (TOPROL-XL) 25 mg 24 hr tablet, Take 1 tablet (25 mg total) by mouth daily, Disp: 90 tablet, Rfl: 1    naproxen (Naprosyn) 500 mg tablet, Take 1 tablet (500 mg total) by mouth 2 (two) times a day with meals, Disp: 30 tablet, Rfl: 0    The following portions of the patient's history were reviewed and updated as appropriate: allergies, current medications, past family history, past medical history, past social history, past surgical history and problem list     Review of Systems   Constitutional: Negative  HENT: Negative  Negative for congestion  Eyes: Negative  Respiratory: Negative  Negative for shortness of breath  Cardiovascular: Negative  Gastrointestinal: Negative  Endocrine: Negative  Genitourinary: Negative  Musculoskeletal: Negative  Skin: Negative  Allergic/Immunologic: Negative  Neurological: Positive for headaches  Negative for dizziness and light-headedness  Hematological: Negative  Psychiatric/Behavioral: Negative  Objective:    /86 (BP Location: Left arm, Patient Position: Sitting, Cuff Size: Large)   Pulse 81   Resp 16   Ht 5' 4" (1 626 m)   Wt 90 3 kg (199 lb)   LMP 01/01/2016   SpO2 99%   BMI 34 16 kg/m²      Physical Exam  Vitals and nursing note reviewed  Constitutional:       General: She is not in acute distress  Appearance: Normal appearance  She is obese  She is not ill-appearing  Eyes:      Extraocular Movements: Extraocular movements intact  Conjunctiva/sclera: Conjunctivae normal       Pupils: Pupils are equal, round, and reactive to light  Cardiovascular:      Rate and Rhythm: Normal rate and regular rhythm  Pulses: Normal pulses  Heart sounds: Normal heart sounds  Pulmonary:      Effort: Pulmonary effort is normal       Breath sounds: Normal breath sounds  Neurological:      General: No focal deficit present  Mental Status: She is alert and oriented to person, place, and time  Mental status is at baseline  Psychiatric:         Mood and Affect: Mood normal          Behavior: Behavior normal          Thought Content:  Thought content normal          Judgment: Judgment normal            No results found for this or any previous visit (from the past 1008 hour(s))  Assessment/Plan:    Primary hypertension  Once again the patient's hypertension remains very well controlled on Toprol XL 25 mg once daily  Continue same dosage  Refill provided  Re-evaluate in 6 months that of RA that is enough    Palpitations  In patient's palpitations are no longer even noticeable since being on Toprol XL 25 mg once daily  Patient did have extensive cardiac testing in July of 2020 which was largely unrevealing    Mixed hyperlipidemia  Patient has been intolerant of statins in the past   She continues taking red yeast rice and trying to watch her dietary intake of cholesterol  Repeat lipid panel ordered    Frequent headaches  Patient does complain of daily headaches  Sometimes they are worse than others  Not classical migraine  Possibly some atypical migraine headaches  I did recommend that she have vision examination with her eye health specialist and will also provide referral to [facility] migraine specialist    Encounter for screening mammogram for breast cancer  Due for mammogram          Problem List Items Addressed This Visit        Cardiovascular and Mediastinum    Primary hypertension     Once again the patient's hypertension remains very well controlled on Toprol XL 25 mg once daily  Continue same dosage  Refill provided  Re-evaluate in 6 months that of RA that is enough         Relevant Medications    metoprolol succinate (TOPROL-XL) 25 mg 24 hr tablet    Other Relevant Orders    CBC and differential    TSH, 3rd generation with Free T4 reflex       Other    Encounter for screening mammogram for breast cancer     Due for mammogram         Relevant Orders    Mammo screening bilateral w 3d & cad    Frequent headaches     Patient does complain of daily headaches  Sometimes they are worse than others  Not classical migraine  Possibly some atypical migraine headaches    I did recommend that she have vision examination with her eye health specialist and will also provide referral to [facility] migraine specialist         Relevant Orders    Ambulatory Referral to Neurology    Mixed hyperlipidemia - Primary     Patient has been intolerant of statins in the past   She continues taking red yeast rice and trying to watch her dietary intake of cholesterol  Repeat lipid panel ordered         Relevant Orders    Comprehensive metabolic panel    Lipid panel    Palpitations     In patient's palpitations are no longer even noticeable since being on Toprol XL 25 mg once daily    Patient did have extensive cardiac testing in July of 2020 which was largely unrevealing           Other Visit Diagnoses     Flu vaccine need        Relevant Orders    influenza vaccine, quadrivalent, recombinant, PF, 0 5 mL, for patients 18 yr+ (FLUBLOK) (Completed)

## 2022-10-03 NOTE — ASSESSMENT & PLAN NOTE
Patient has been intolerant of statins in the past   She continues taking red yeast rice and trying to watch her dietary intake of cholesterol    Repeat lipid panel ordered

## 2022-10-03 NOTE — ASSESSMENT & PLAN NOTE
In patient's palpitations are no longer even noticeable since being on Toprol XL 25 mg once daily    Patient did have extensive cardiac testing in July of 2020 which was largely unrevealing Vitals capture stopped.

## 2022-11-03 ENCOUNTER — OFFICE VISIT (OUTPATIENT)
Dept: URGENT CARE | Facility: CLINIC | Age: 62
End: 2022-11-03

## 2022-11-03 VITALS
SYSTOLIC BLOOD PRESSURE: 140 MMHG | HEIGHT: 64 IN | HEART RATE: 78 BPM | WEIGHT: 198 LBS | RESPIRATION RATE: 18 BRPM | OXYGEN SATURATION: 100 % | BODY MASS INDEX: 33.8 KG/M2 | DIASTOLIC BLOOD PRESSURE: 90 MMHG | TEMPERATURE: 96.1 F

## 2022-11-03 DIAGNOSIS — J06.9 VIRAL URI WITH COUGH: Primary | ICD-10-CM

## 2022-11-03 LAB
S PYO AG THROAT QL: NEGATIVE
SARS-COV-2 AG UPPER RESP QL IA: NEGATIVE
VALID CONTROL: NORMAL

## 2022-11-03 RX ORDER — AMPICILLIN TRIHYDRATE 250 MG
50 CAPSULE ORAL
COMMUNITY

## 2022-11-03 RX ORDER — FLUTICASONE PROPIONATE 50 MCG
1 SPRAY, SUSPENSION (ML) NASAL DAILY
Qty: 18.2 ML | Refills: 0 | Status: SHIPPED | OUTPATIENT
Start: 2022-11-03

## 2022-11-03 RX ORDER — LORATADINE 10 MG/1
10 TABLET ORAL DAILY
Qty: 30 TABLET | Refills: 0 | Status: SHIPPED | OUTPATIENT
Start: 2022-11-03

## 2022-11-03 RX ORDER — CHLORAL HYDRATE 500 MG
1000 CAPSULE ORAL DAILY
COMMUNITY

## 2022-11-03 NOTE — PATIENT INSTRUCTIONS
Rapid COVID and strep A negative  Recommend continuing supportive care, can take Flonase and Claritin as instructed  Discussed that the symptoms are most likely viral in nature, low suspicion for bacterial etiology in light of only 3 days of symptoms and resolving fever  With any progression or worsening of symptoms to return to be seen in ER

## 2022-11-03 NOTE — LETTER
November 3, 2022     Patient: Sourav Varela   YOB: 1960   Date of Visit: 11/3/2022       To Whom It May Concern: It is my medical opinion that Mony Soto may return to work on 11/07/2022  Please excuse her absence until this time  If you have any questions or concerns, please don't hesitate to call           Sincerely,        Landen Garg PA-C    CC: No Recipients

## 2022-11-03 NOTE — PROGRESS NOTES
[facility]        NAME: Farideh Pierce is a 58 y o  female  : 1960    MRN: [id number]  DATE: November 3, 2022  TIME: 5:26 PM    Assessment and Plan   Viral URI with cough [J06 9]  1  Viral URI with cough  POCT rapid strepA    fluticasone (FLONASE) 50 mcg/act nasal spray    loratadine (CLARITIN) 10 mg tablet    Poct Covid 19 Rapid Antigen Test         Patient Instructions   Patient Instructions   Rapid COVID and strep A negative  Recommend continuing supportive care, can take Flonase and Claritin as instructed  Discussed that the symptoms are most likely viral in nature, low suspicion for bacterial etiology in light of only 3 days of symptoms and resolving fever  With any progression or worsening of symptoms to return to be seen in ER  Follow up with PCP in 3-5 days  Proceed to  ER if symptoms worsen  Chief Complaint     Chief Complaint   Patient presents with   • Sore Throat   • Cold Like Symptoms   • Cough     X 3 days - sore throat "feels like razor blades", harsh, ticklish cough with nasal congestion/rhinorrhea/PND  Feels nauseated  Had fever 100 4 on day 1  Taking Tylenol and Mucinex  History of Present Illness       Patient is a 78-year-old female presenting today with cold symptoms x3 days  Patient notes over the last few days she has been experiencing some nasal congestion, postnasal drip, sore throat and a cough, notes that she was at a [employer] party last week notes that multiple individuals are experiencing similar symptoms, has been taking over-the-counter Tylenol which has provided some relief  Expresses concern of possibility of sinus infection  Denies sinus pain, trouble swallowing, chest tightness, SOB  States that she was running a fever yesterday, has been afebrile today  Review of Systems   Review of Systems   Constitutional: Negative for chills, fatigue and fever  HENT: Positive for congestion, postnasal drip and sore throat      Eyes: Negative for redness and itching  Respiratory: Positive for cough  Negative for chest tightness and shortness of breath  Cardiovascular: Negative for chest pain  Gastrointestinal: Negative for diarrhea, nausea and vomiting  Musculoskeletal: Negative for arthralgias and myalgias  Neurological: Negative for light-headedness and headaches           Current Medications       Current Outpatient Medications:   •  Co Q-10 50 MG, Take 50 mg by mouth daily after breakfast, Disp: , Rfl:   •  esomeprazole (NexIUM) 40 MG capsule, Take 1 capsule (40 mg total) by mouth 2 (two) times a day, Disp: 60 capsule, Rfl: 2  •  fluticasone (FLONASE) 50 mcg/act nasal spray, 1 spray into each nostril daily, Disp: 18 2 mL, Rfl: 0  •  loratadine (CLARITIN) 10 mg tablet, Take 1 tablet (10 mg total) by mouth daily, Disp: 30 tablet, Rfl: 0  •  metoprolol succinate (TOPROL-XL) 25 mg 24 hr tablet, Take 1 tablet (25 mg total) by mouth daily, Disp: 90 tablet, Rfl: 1  •  naproxen (Naprosyn) 500 mg tablet, Take 1 tablet (500 mg total) by mouth 2 (two) times a day with meals (Patient taking differently: Take 500 mg by mouth 2 (two) times a day as needed), Disp: 30 tablet, Rfl: 0  •  Omega-3 Fatty Acids (fish oil) 1,000 mg, Take 1,000 mg by mouth daily, Disp: , Rfl:   •  Red Yeast Rice Extract (RED YEAST RICE PO), Take by mouth 2 (two) times a day, Disp: , Rfl:   •  dicyclomine (BENTYL) 10 mg capsule, Take 1 capsule (10 mg total) by mouth 4 (four) times a day (before meals and at bedtime) (Patient not taking: Reported on 11/3/2022), Disp: 120 capsule, Rfl: 2    Current Allergies     Allergies as of 11/03/2022 - Reviewed 11/03/2022   Allergen Reaction Noted   • Lactose - food allergy GI Intolerance 09/24/2021            The following portions of the patient's history were reviewed and updated as appropriate: allergies, current medications, past family history, past medical history, past social history, past surgical history and problem list      Past Medical History:   Diagnosis Date   • Abdominal pain     sometimes   • Arthritis    • Back pain    • Christianson esophagus    • Breast lump     last assessed 8/24/10   • Chest pain    • Colon polyp    • Constipation    • Diarrhea    • Difficulty concentrating    • Dizziness    • Fatigue     in the AM   • Gastritis 7/28/2017   • GERD (gastroesophageal reflux disease)    • Headache    • Hearing problem    • Hepatitis    • Hiatal hernia    • Hyperlipemia    • Hyperlipidemia    • Hypertension    • Infectious viral hepatitis     Hepatitis B   • Lack of energy    • Lack of energy    • Myalgia     last assessed  1/29/15   • Myositis     last assessed  1/29/15   • Nausea    • Numbness and tingling    • Palpitations    • Palpitations    • Pernicious anemia    • Post-menopausal    • Rash     sometimes on abdomen   • Tinnitus    • Wheezing        Past Surgical History:   Procedure Laterality Date   • CHOLECYSTECTOMY     • COLONOSCOPY     • EGD     • HERNIA REPAIR  1965   • KNEE ARTHROSCOPY Right     meniscus   • PA KNEE SCOPE,SINGLE MENISECTOMY Right 06/03/2019    Procedure: RIGHT KNEE ARTHROSCOPIC PARTIAL MEDIAL MENISCECTOMY; CHONDROPLASTY;  Surgeon: Veronika Carlson MD;  Location: [facility];  Service: Orthopedics   • TUBAL LIGATION     • URETHRAL SLING     • VAGINA SURGERY  2014    Vaginal sling   • WISDOM TOOTH EXTRACTION         Family History   Problem Relation Age of Onset   • Atrial fibrillation Mother    • Breast cancer Mother 61   • Hypertension Mother    • Cancer Mother    • Stroke Mother    • Arthritis Mother    • Hyperlipidemia Mother    • Hypertension Father    • Hyperlipidemia Father    • Sudden death Father    • Aneurysm Sister         abdominal aortic   • Hypertension Sister    • Hypertension Sister    • Cancer Maternal Grandmother    • No Known Problems Daughter    • Breast cancer Maternal Aunt 60   • No Known Problems Maternal Aunt          Medications have been verified          Objective   /90   Pulse 78 Temp (!) 96 1 °F (35 6 °C)   Resp 18   Ht 5' 4" (1 626 m)   Wt 89 8 kg (198 lb)   LMP 01/01/2016   SpO2 100%   BMI 33 99 kg/m²        Physical Exam     Physical Exam  Vitals and nursing note reviewed  Constitutional:       General: She is not in acute distress  Appearance: Normal appearance  She is not ill-appearing  HENT:      Head: Normocephalic and atraumatic  Right Ear: Tympanic membrane, ear canal and external ear normal       Left Ear: Tympanic membrane, ear canal and external ear normal       Nose: Congestion present  Mouth/Throat:      Mouth: Mucous membranes are moist       Comments: Mild erythema of pharynx, findings consistent with postnasal drip, no tonsillar swelling erythema or exudate, uvula midline  Eyes:      Conjunctiva/sclera: Conjunctivae normal    Cardiovascular:      Rate and Rhythm: Normal rate and regular rhythm  Pulses: Normal pulses  Heart sounds: Normal heart sounds  Pulmonary:      Effort: Pulmonary effort is normal       Breath sounds: Normal breath sounds  Lymphadenopathy:      Cervical: No cervical adenopathy  Skin:     General: Skin is warm  Capillary Refill: Capillary refill takes less than 2 seconds  Neurological:      Mental Status: She is alert

## 2022-11-14 ENCOUNTER — TELEPHONE (OUTPATIENT)
Dept: NEUROLOGY | Facility: CLINIC | Age: 62
End: 2022-11-14

## 2022-11-16 ENCOUNTER — OFFICE VISIT (OUTPATIENT)
Dept: FAMILY MEDICINE CLINIC | Facility: CLINIC | Age: 62
End: 2022-11-16

## 2022-11-16 VITALS
SYSTOLIC BLOOD PRESSURE: 132 MMHG | DIASTOLIC BLOOD PRESSURE: 78 MMHG | RESPIRATION RATE: 16 BRPM | HEIGHT: 64 IN | WEIGHT: 195 LBS | TEMPERATURE: 97.8 F | BODY MASS INDEX: 33.29 KG/M2 | OXYGEN SATURATION: 98 % | HEART RATE: 70 BPM

## 2022-11-16 DIAGNOSIS — J06.9 VIRAL UPPER RESPIRATORY TRACT INFECTION: Primary | ICD-10-CM

## 2022-11-16 RX ORDER — METHYLPREDNISOLONE 4 MG/1
TABLET ORAL
COMMUNITY
Start: 2022-11-06

## 2022-11-16 NOTE — ASSESSMENT & PLAN NOTE
Patient has been worked up adequately and treated accordingly  She did not have flu swab performed even though she had received flu vaccine  Still could have been a possibility  Offered to test but at this point it is 2 weeks out and really would not make a difference in treatment    -advised on supportive care measures  Patient will be given prescription for Magic mouthwash to swish and swallow which should alleviate some of the throat discomfort    -no need for antibiotics or an additional round of steroids  Lungs are clear

## 2022-11-16 NOTE — PROGRESS NOTES
Subjective:      Patient ID: Joe Damon is a 58 y o  female  59-year-old female presents complaining of a 2 week history of upper respiratory symptoms with sore throat, nasal congestion, fatigue, malaise, cough  She was seen at 2 urgent care facilities  First urgent care facility performed rapid strep and gave her Claritin as well as Flonase which did not provide any relief  Second urgent care visit they performed COVID-19 swab which was negative  She also took home COVID-19 swabs which were negative  Symptoms are progressively feeling a little bit better but still with significant sore throat  Second urgent care visit she was placed on course of Zithromax as well as methylprednisolone  She states that she did feel better overall on the methylprednisolone   was also sick with similar symptoms        Past Medical History:   Diagnosis Date   • Abdominal pain     sometimes   • Arthritis    • Back pain    • Christianson esophagus    • Breast lump     last assessed 8/24/10   • Chest pain    • Colon polyp    • Constipation    • Diarrhea    • Difficulty concentrating    • Dizziness    • Fatigue     in the AM   • Gastritis 7/28/2017   • GERD (gastroesophageal reflux disease)    • Headache    • Hearing problem    • Hepatitis    • Hiatal hernia    • Hyperlipemia    • Hyperlipidemia    • Hypertension    • Infectious viral hepatitis     Hepatitis B   • Lack of energy    • Lack of energy    • Myalgia     last assessed  1/29/15   • Myositis     last assessed  1/29/15   • Nausea    • Numbness and tingling    • Palpitations    • Palpitations    • Pernicious anemia    • Post-menopausal    • Rash     sometimes on abdomen   • Tinnitus    • Wheezing        Family History   Problem Relation Age of Onset   • Atrial fibrillation Mother    • Breast cancer Mother 61   • Hypertension Mother    • Cancer Mother    • Stroke Mother    • Arthritis Mother    • Hyperlipidemia Mother    • Hypertension Father    • Hyperlipidemia Father    • Sudden death Father    • Aneurysm Sister         abdominal aortic   • Hypertension Sister    • Hypertension Sister    • Cancer Maternal Grandmother    • No Known Problems Daughter    • Breast cancer Maternal Aunt 61   • No Known Problems Maternal Aunt        Past Surgical History:   Procedure Laterality Date   • CHOLECYSTECTOMY     • COLONOSCOPY     • EGD     • HERNIA REPAIR  1965   • KNEE ARTHROSCOPY Right     meniscus   • NM KNEE SCOPE,SINGLE MENISECTOMY Right 06/03/2019    Procedure: RIGHT KNEE ARTHROSCOPIC PARTIAL MEDIAL MENISCECTOMY; CHONDROPLASTY;  Surgeon: Mega Cook MD;  Location: [facility];  Service: Orthopedics   • TUBAL LIGATION     • URETHRAL SLING     • VAGINA SURGERY  2014    Vaginal sling   • WISDOM TOOTH EXTRACTION          reports that she quit smoking about 31 years ago  Her smoking use included cigarettes  She has never used smokeless tobacco  She reports current alcohol use  She reports that she does not use drugs        Current Outpatient Medications:   •  al mag oxide-diphenhydramine-lidocaine viscous (MAGIC MOUTHWASH) 1:1:1 suspension, Swish and swallow 10 mL every 6 (six) hours as needed for mouth pain or discomfort, Disp: 180 mL, Rfl: 1  •  Co Q-10 50 MG, Take 50 mg by mouth daily after breakfast, Disp: , Rfl:   •  dicyclomine (BENTYL) 10 mg capsule, Take 1 capsule (10 mg total) by mouth 4 (four) times a day (before meals and at bedtime), Disp: 120 capsule, Rfl: 2  •  esomeprazole (NexIUM) 40 MG capsule, Take 1 capsule (40 mg total) by mouth 2 (two) times a day, Disp: 60 capsule, Rfl: 2  •  fluticasone (FLONASE) 50 mcg/act nasal spray, 1 spray into each nostril daily, Disp: 18 2 mL, Rfl: 0  •  loratadine (CLARITIN) 10 mg tablet, Take 1 tablet (10 mg total) by mouth daily, Disp: 30 tablet, Rfl: 0  •  methylPREDNISolone 4 MG tablet therapy pack, , Disp: , Rfl:   •  metoprolol succinate (TOPROL-XL) 25 mg 24 hr tablet, Take 1 tablet (25 mg total) by mouth daily, Disp: 90 tablet, Rfl: 1  •  naproxen (Naprosyn) 500 mg tablet, Take 1 tablet (500 mg total) by mouth 2 (two) times a day with meals (Patient taking differently: Take 500 mg by mouth 2 (two) times a day as needed), Disp: 30 tablet, Rfl: 0  •  Omega-3 Fatty Acids (fish oil) 1,000 mg, Take 1,000 mg by mouth daily, Disp: , Rfl:   •  Red Yeast Rice Extract (RED YEAST RICE PO), Take by mouth 2 (two) times a day, Disp: , Rfl:     The following portions of the patient's history were reviewed and updated as appropriate: allergies, current medications, past family history, past medical history, past social history, past surgical history and problem list     Review of Systems   Constitutional: Positive for fatigue  Negative for activity change, appetite change, chills, diaphoresis, fever and unexpected weight change  HENT: Positive for congestion, postnasal drip and sore throat  Negative for ear pain, rhinorrhea and trouble swallowing  Respiratory: Positive for cough  Negative for chest tightness, shortness of breath and wheezing  Cardiovascular: Negative  All other systems reviewed and are negative  Objective:    /78   Pulse 70   Temp 97 8 °F (36 6 °C) (Tympanic)   Resp 16   Ht 5' 4" (1 626 m)   Wt 88 5 kg (195 lb)   LMP 01/01/2016   SpO2 98%   BMI 33 47 kg/m²      Physical Exam  Vitals and nursing note reviewed  Constitutional:       General: She is not in acute distress  Appearance: She is well-developed and well-nourished  She is ill-appearing  HENT:      Head: Normocephalic and atraumatic  Right Ear: Hearing and external ear normal       Left Ear: Hearing and external ear normal       Nose: Mucosal edema present  No nasal deformity or septal deviation  Right Sinus: No maxillary sinus tenderness or frontal sinus tenderness  Left Sinus: No maxillary sinus tenderness or frontal sinus tenderness        Mouth/Throat:      Mouth: Mucous membranes are moist       Pharynx: Posterior oropharyngeal erythema present  Tonsils: No tonsillar exudate  0 on the right  0 on the left  Comments: Cobblestoning posterior oropharynx  Cardiovascular:      Rate and Rhythm: Normal rate and regular rhythm  Pulmonary:      Effort: Pulmonary effort is normal       Breath sounds: Normal breath sounds  Musculoskeletal:      Cervical back: Normal range of motion and neck supple  Neurological:      Mental Status: She is alert  Recent Results (from the past 1008 hour(s))   POCT rapid strepA    Collection Time: 11/03/22  5:22 PM   Result Value Ref Range     RAPID STREP A Negative Negative   Poct Covid 19 Rapid Antigen Test    Collection Time: 11/03/22  5:23 PM   Result Value Ref Range    POCT SARS-CoV-2 Ag Negative Negative    VALID CONTROL Valid        Assessment/Plan:    Viral upper respiratory tract infection  Patient has been worked up adequately and treated accordingly  She did not have flu swab performed even though she had received flu vaccine  Still could have been a possibility  Offered to test but at this point it is 2 weeks out and really would not make a difference in treatment    -advised on supportive care measures  Patient will be given prescription for Magic mouthwash to swish and swallow which should alleviate some of the throat discomfort    -no need for antibiotics or an additional round of steroids  Lungs are clear  Problem List Items Addressed This Visit        Respiratory    Viral upper respiratory tract infection - Primary     Patient has been worked up adequately and treated accordingly  She did not have flu swab performed even though she had received flu vaccine  Still could have been a possibility  Offered to test but at this point it is 2 weeks out and really would not make a difference in treatment    -advised on supportive care measures    Patient will be given prescription for Magic mouthwash to swish and swallow which should alleviate some of the throat discomfort    -no need for antibiotics or an additional round of steroids  Lungs are clear           Relevant Medications    al mag oxide-diphenhydramine-lidocaine viscous (MAGIC MOUTHWASH) 1:1:1 suspension

## 2022-12-02 ENCOUNTER — APPOINTMENT (EMERGENCY)
Dept: RADIOLOGY | Facility: HOSPITAL | Age: 62
End: 2022-12-02

## 2022-12-02 ENCOUNTER — HOSPITAL ENCOUNTER (EMERGENCY)
Facility: HOSPITAL | Age: 62
Discharge: HOME | End: 2022-12-02
Attending: EMERGENCY MEDICINE

## 2022-12-02 VITALS
DIASTOLIC BLOOD PRESSURE: 75 MMHG | HEART RATE: 67 BPM | SYSTOLIC BLOOD PRESSURE: 126 MMHG | OXYGEN SATURATION: 100 % | RESPIRATION RATE: 20 BRPM | BODY MASS INDEX: 32.78 KG/M2 | HEIGHT: 64 IN | WEIGHT: 192 LBS | TEMPERATURE: 97.6 F
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
## 2022-12-02 DIAGNOSIS — S22.39XA RIB FRACTURE: ICD-10-CM

## 2022-12-02 DIAGNOSIS — S22.22XA FRACTURE OF BODY OF STERNUM, INITIAL ENCOUNTER FOR CLOSED FRACTURE: ICD-10-CM

## 2022-12-02 LAB
ALBUMIN SERPL BCP-MCNC: 3.6 G/DL (ref 3.5–5)
ALP SERPL-CCNC: 97 U/L (ref 46–116)
ALT SERPL W P-5'-P-CCNC: 29 U/L (ref 12–78)
ANION GAP SERPL CALCULATED.3IONS-SCNC: 5 MMOL/L (ref 4–13)
AST SERPL W P-5'-P-CCNC: 24 U/L (ref 5–45)
BASOPHILS # BLD AUTO: 0.02 THOUSANDS/ÂΜL (ref 0–0.1)
BASOPHILS NFR BLD AUTO: 0 % (ref 0–1)
BILIRUB SERPL-MCNC: 0.49 MG/DL (ref 0.2–1)
BUN SERPL-MCNC: 13 MG/DL (ref 5–25)
CALCIUM SERPL-MCNC: 9 MG/DL (ref 8.3–10.1)
CARDIAC TROPONIN I PNL SERPL HS: <2 NG/L
CHLORIDE SERPL-SCNC: 109 MMOL/L (ref 96–108)
CO2 SERPL-SCNC: 29 MMOL/L (ref 21–32)
CREAT SERPL-MCNC: 0.81 MG/DL (ref 0.6–1.3)
EOSINOPHIL # BLD AUTO: 0.1 THOUSAND/ÂΜL (ref 0–0.61)
EOSINOPHIL NFR BLD AUTO: 2 % (ref 0–6)
ERYTHROCYTE [DISTWIDTH] IN BLOOD BY AUTOMATED COUNT: 13 % (ref 11.6–15.1)
GFR SERPL CREATININE-BSD FRML MDRD: 78 ML/MIN/1.73SQ M
GLUCOSE SERPL-MCNC: 91 MG/DL (ref 65–140)
HCT VFR BLD AUTO: 43.5 % (ref 34.8–46.1)
HGB BLD-MCNC: 14.2 G/DL (ref 11.5–15.4)
IMM GRANULOCYTES # BLD AUTO: 0.03 THOUSAND/UL (ref 0–0.2)
IMM GRANULOCYTES NFR BLD AUTO: 0 % (ref 0–2)
LYMPHOCYTES # BLD AUTO: 1.28 THOUSANDS/ÂΜL (ref 0.6–4.47)
LYMPHOCYTES NFR BLD AUTO: 19 % (ref 14–44)
MCH RBC QN AUTO: 29.1 PG (ref 26.8–34.3)
MCHC RBC AUTO-ENTMCNC: 32.6 G/DL (ref 31.4–37.4)
MCV RBC AUTO: 89 FL (ref 82–98)
MONOCYTES # BLD AUTO: 0.41 THOUSAND/ÂΜL (ref 0.17–1.22)
MONOCYTES NFR BLD AUTO: 6 % (ref 4–12)
NEUTROPHILS # BLD AUTO: 4.87 THOUSANDS/ÂΜL (ref 1.85–7.62)
NEUTS SEG NFR BLD AUTO: 73 % (ref 43–75)
NRBC BLD AUTO-RTO: 0 /100 WBCS
PLATELET # BLD AUTO: 188 THOUSANDS/UL (ref 149–390)
PMV BLD AUTO: 11.9 FL (ref 8.9–12.7)
POTASSIUM SERPL-SCNC: 4 MMOL/L (ref 3.5–5.3)
PROT SERPL-MCNC: 7.3 G/DL (ref 6.4–8.4)
RBC # BLD AUTO: 4.88 MILLION/UL (ref 3.81–5.12)
SODIUM SERPL-SCNC: 143 MMOL/L (ref 135–147)
WBC # BLD AUTO: 6.71 THOUSAND/UL (ref 4.31–10.16)

## 2022-12-02 RX ORDER — HYDROCODONE BITARTRATE AND ACETAMINOPHEN 5; 325 MG/1; MG/1
1 TABLET ORAL EVERY 6 HOURS PRN
Qty: 10 TABLET | Refills: 0 | Status: SHIPPED | OUTPATIENT
Start: 2022-12-02 | End: 2022-12-08 | Stop reason: SDUPTHER

## 2022-12-02 RX ORDER — KETOROLAC TROMETHAMINE 30 MG/ML
15 INJECTION, SOLUTION INTRAMUSCULAR; INTRAVENOUS ONCE
Status: COMPLETED | OUTPATIENT
Start: 2022-12-02 | End: 2022-12-02

## 2022-12-02 RX ADMIN — IOHEXOL 100 ML: 350 INJECTION, SOLUTION INTRAVENOUS at 14:32

## 2022-12-02 RX ADMIN — KETOROLAC TROMETHAMINE 15 MG: 30 INJECTION, SOLUTION INTRAMUSCULAR at 15:52

## 2022-12-02 RX ADMIN — SODIUM CHLORIDE 1000 ML: 0.9 INJECTION, SOLUTION INTRAVENOUS at 13:18

## 2022-12-02 NOTE — ED PROCEDURE NOTE
PROCEDURE  ECG 12 Lead Documentation Only    Date/Time: 12/2/2022 12:52 PM  Performed by: Kirstie Toledo DO  Authorized by: Kirstie Toledo DO     ECG reviewed by me, the ED Provider: yes    Patient location:  ED  Interpretation:     Interpretation: normal    Rate:     ECG rate:  83    ECG rate assessment: normal    Rhythm:     Rhythm: sinus rhythm    Ectopy:     Ectopy: none    ST segments:     ST segments:  Normal  T waves:     T waves: normal           Kirstie Toledo DO  12/02/22 1252
Quality 137: Melanoma: Continuity Of Care - Recall System: Patient information entered into a recall system that includes: target date for the next exam specified AND a process to follow up with patients regarding missed or unscheduled appointments
Detail Level: Detailed
Detail Level: Zone

## 2022-12-02 NOTE — Clinical Note
Sotero Ahuja was seen and treated in our emergency department on 12/2/2022  Diagnosis: Rib Fx, Sternum Fx    Narvis Melissa    She may return on this date:     Please excuse from work for 5 days  If you have any questions or concerns, please don't hesitate to call        Terence Buckner, DO    ______________________________           _______________          _______________  Hospital Representative                              Date                                Time

## 2022-12-03 LAB
ATRIAL RATE: 83 BPM
P AXIS: 48 DEGREES
PR INTERVAL: 192 MS
QRS AXIS: -24 DEGREES
QRSD INTERVAL: 96 MS
QT INTERVAL: 378 MS
QTC INTERVAL: 444 MS
T WAVE AXIS: 30 DEGREES
VENTRICULAR RATE: 83 BPM
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
## 2022-12-08 ENCOUNTER — OFFICE VISIT (OUTPATIENT)
Dept: FAMILY MEDICINE CLINIC | Facility: CLINIC | Age: 62
End: 2022-12-08

## 2022-12-08 VITALS
RESPIRATION RATE: 16 BRPM | OXYGEN SATURATION: 98 % | HEART RATE: 72 BPM | BODY MASS INDEX: 32.78 KG/M2 | WEIGHT: 192 LBS | SYSTOLIC BLOOD PRESSURE: 134 MMHG | HEIGHT: 64 IN | DIASTOLIC BLOOD PRESSURE: 82 MMHG

## 2022-12-08 DIAGNOSIS — S22.32XD CLOSED FRACTURE OF ONE RIB OF LEFT SIDE WITH ROUTINE HEALING: ICD-10-CM

## 2022-12-08 DIAGNOSIS — S22.31XA CLOSED FRACTURE OF ONE RIB OF RIGHT SIDE, INITIAL ENCOUNTER: Primary | ICD-10-CM

## 2022-12-08 DIAGNOSIS — S22.22XD CLOSED FRACTURE OF BODY OF STERNUM WITH ROUTINE HEALING: ICD-10-CM

## 2022-12-08 DIAGNOSIS — S22.39XA RIB FRACTURE: ICD-10-CM
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
## 2022-12-08 DIAGNOSIS — S22.22XA FRACTURE OF BODY OF STERNUM, INITIAL ENCOUNTER FOR CLOSED FRACTURE: ICD-10-CM

## 2022-12-08 DIAGNOSIS — S80.02XA CONTUSION OF LEFT KNEE, INITIAL ENCOUNTER: ICD-10-CM

## 2022-12-08 RX ORDER — HYDROCODONE BITARTRATE AND ACETAMINOPHEN 5; 325 MG/1; MG/1
1 TABLET ORAL EVERY 6 HOURS PRN
Qty: 30 TABLET | Refills: 0 | Status: SHIPPED | OUTPATIENT
Start: 2022-12-08

## 2022-12-08 RX ORDER — IBUPROFEN 600 MG/1
600 TABLET ORAL EVERY 6 HOURS PRN
Qty: 60 TABLET | Refills: 0 | Status: SHIPPED | OUTPATIENT
Start: 2022-12-08
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
## 2022-12-22 ENCOUNTER — OFFICE VISIT (OUTPATIENT)
Dept: FAMILY MEDICINE CLINIC | Facility: CLINIC | Age: 62
End: 2022-12-22

## 2022-12-22 VITALS
HEART RATE: 74 BPM | OXYGEN SATURATION: 96 % | DIASTOLIC BLOOD PRESSURE: 86 MMHG | BODY MASS INDEX: 32.78 KG/M2 | WEIGHT: 192 LBS | HEIGHT: 64 IN | SYSTOLIC BLOOD PRESSURE: 132 MMHG | RESPIRATION RATE: 16 BRPM

## 2022-12-22 DIAGNOSIS — S22.32XD CLOSED FRACTURE OF ONE RIB OF LEFT SIDE WITH ROUTINE HEALING: Primary | ICD-10-CM

## 2022-12-22 DIAGNOSIS — S22.22XD CLOSED FRACTURE OF BODY OF STERNUM WITH ROUTINE HEALING: ICD-10-CM
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
## 2022-12-28 DIAGNOSIS — K21.9 GERD WITHOUT ESOPHAGITIS: ICD-10-CM

## 2022-12-28 RX ORDER — ESOMEPRAZOLE MAGNESIUM 40 MG/1
CAPSULE, DELAYED RELEASE ORAL
Qty: 60 CAPSULE | Refills: 2 | Status: SHIPPED | OUTPATIENT
Start: 2022-12-28

## 2023-01-05 ENCOUNTER — OFFICE VISIT (OUTPATIENT)
Dept: FAMILY MEDICINE CLINIC | Facility: CLINIC | Age: 63
End: 2023-01-05

## 2023-01-05 VITALS
SYSTOLIC BLOOD PRESSURE: 118 MMHG | HEART RATE: 84 BPM | DIASTOLIC BLOOD PRESSURE: 78 MMHG | OXYGEN SATURATION: 97 % | WEIGHT: 202 LBS | BODY MASS INDEX: 34.49 KG/M2 | RESPIRATION RATE: 16 BRPM | HEIGHT: 64 IN

## 2023-01-05 DIAGNOSIS — S22.22XD CLOSED FRACTURE OF BODY OF STERNUM WITH ROUTINE HEALING: ICD-10-CM

## 2023-01-05 DIAGNOSIS — S22.32XD CLOSED FRACTURE OF ONE RIB OF LEFT SIDE WITH ROUTINE HEALING: Primary | ICD-10-CM

## 2023-01-05 PROBLEM — J06.9 VIRAL UPPER RESPIRATORY TRACT INFECTION: Status: RESOLVED | Noted: 2022-11-16 | Resolved: 2023-01-05
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
## 2023-01-05 NOTE — ASSESSMENT & PLAN NOTE
At this point her left seventh rib fracture should be very well-healed     -She is not really complaining of any significant amount of pain in this area any longer
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
## 2023-01-07 ENCOUNTER — HOSPITAL ENCOUNTER (OUTPATIENT)
Dept: RADIOLOGY | Facility: HOSPITAL | Age: 63
Discharge: HOME | End: 2023-01-07

## 2023-01-07 DIAGNOSIS — S22.22XD CLOSED FRACTURE OF BODY OF STERNUM WITH ROUTINE HEALING: ICD-10-CM
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
## 2023-01-16 ENCOUNTER — TELEPHONE (OUTPATIENT)
Dept: FAMILY MEDICINE CLINIC | Facility: CLINIC | Age: 63
End: 2023-01-16
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
## 2023-01-19 ENCOUNTER — OFFICE VISIT (OUTPATIENT)
Dept: FAMILY MEDICINE CLINIC | Facility: CLINIC | Age: 63
End: 2023-01-19

## 2023-01-19 VITALS
WEIGHT: 202 LBS | DIASTOLIC BLOOD PRESSURE: 78 MMHG | OXYGEN SATURATION: 97 % | HEART RATE: 82 BPM | HEIGHT: 64 IN | RESPIRATION RATE: 16 BRPM | SYSTOLIC BLOOD PRESSURE: 128 MMHG | BODY MASS INDEX: 34.49 KG/M2

## 2023-01-19 DIAGNOSIS — S22.31XA CLOSED FRACTURE OF ONE RIB OF RIGHT SIDE, INITIAL ENCOUNTER: Primary | ICD-10-CM

## 2023-01-19 DIAGNOSIS — S22.22XD CLOSED FRACTURE OF BODY OF STERNUM WITH ROUTINE HEALING: ICD-10-CM

## 2023-01-19 DIAGNOSIS — S22.32XD CLOSED FRACTURE OF ONE RIB OF LEFT SIDE WITH ROUTINE HEALING: ICD-10-CM
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
## 2023-01-19 NOTE — ASSESSMENT & PLAN NOTE
At this point her ribs fracture should be very well-healed  She is having no residual pain    She is cleared to return to work without restriction
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
## 2023-01-20 DIAGNOSIS — I10 PRIMARY HYPERTENSION: ICD-10-CM

## 2023-01-20 RX ORDER — METOPROLOL SUCCINATE 25 MG/1
TABLET, EXTENDED RELEASE ORAL
Qty: 90 TABLET | Refills: 1 | Status: SHIPPED | OUTPATIENT
Start: 2023-01-20

## 2023-02-10 LAB
ALBUMIN SERPL-MCNC: 4.4 G/DL (ref 3.8–4.8)
ALBUMIN/GLOB SERPL: 1.8 {RATIO} (ref 1.2–2.2)
ALP SERPL-CCNC: 93 IU/L (ref 44–121)
ALT SERPL-CCNC: 21 IU/L (ref 0–32)
AST SERPL-CCNC: 25 IU/L (ref 0–40)
BASOPHILS # BLD AUTO: 0 X10E3/UL (ref 0–0.2)
BASOPHILS NFR BLD AUTO: 1 %
BILIRUB SERPL-MCNC: 1 MG/DL (ref 0–1.2)
BUN SERPL-MCNC: 16 MG/DL (ref 8–27)
BUN/CREAT SERPL: 21 (ref 12–28)
CALCIUM SERPL-MCNC: 9.5 MG/DL (ref 8.7–10.3)
CHLORIDE SERPL-SCNC: 101 MMOL/L (ref 96–106)
CHOLEST SERPL-MCNC: 212 MG/DL (ref 100–199)
CO2 SERPL-SCNC: 22 MMOL/L (ref 20–29)
CREAT SERPL-MCNC: 0.76 MG/DL (ref 0.57–1)
EGFR: 89 ML/MIN/1.73
EOSINOPHIL # BLD AUTO: 0.1 X10E3/UL (ref 0–0.4)
EOSINOPHIL NFR BLD AUTO: 2 %
ERYTHROCYTE [DISTWIDTH] IN BLOOD BY AUTOMATED COUNT: 13.2 % (ref 11.7–15.4)
GLOBULIN SER-MCNC: 2.4 G/DL (ref 1.5–4.5)
GLUCOSE SERPL-MCNC: 95 MG/DL (ref 70–99)
HCT VFR BLD AUTO: 44.8 % (ref 34–46.6)
HDLC SERPL-MCNC: 68 MG/DL
HGB BLD-MCNC: 15.1 G/DL (ref 11.1–15.9)
IMM GRANULOCYTES # BLD: 0 X10E3/UL (ref 0–0.1)
IMM GRANULOCYTES NFR BLD: 0 %
LDLC SERPL CALC-MCNC: 132 MG/DL (ref 0–99)
LYMPHOCYTES # BLD AUTO: 1.6 X10E3/UL (ref 0.7–3.1)
LYMPHOCYTES NFR BLD AUTO: 31 %
MCH RBC QN AUTO: 28.7 PG (ref 26.6–33)
MCHC RBC AUTO-ENTMCNC: 33.7 G/DL (ref 31.5–35.7)
MCV RBC AUTO: 85 FL (ref 79–97)
MONOCYTES # BLD AUTO: 0.4 X10E3/UL (ref 0.1–0.9)
MONOCYTES NFR BLD AUTO: 8 %
NEUTROPHILS # BLD AUTO: 3 X10E3/UL (ref 1.4–7)
NEUTROPHILS NFR BLD AUTO: 58 %
PLATELET # BLD AUTO: 164 X10E3/UL (ref 150–450)
POTASSIUM SERPL-SCNC: 4.3 MMOL/L (ref 3.5–5.2)
PROT SERPL-MCNC: 6.8 G/DL (ref 6–8.5)
RBC # BLD AUTO: 5.27 X10E6/UL (ref 3.77–5.28)
SL AMB VLDL CHOLESTEROL CALC: 12 MG/DL (ref 5–40)
SODIUM SERPL-SCNC: 138 MMOL/L (ref 134–144)
TRIGL SERPL-MCNC: 67 MG/DL (ref 0–149)
TSH SERPL DL<=0.005 MIU/L-ACNC: 0.98 UIU/ML (ref 0.45–4.5)
WBC # BLD AUTO: 5.1 X10E3/UL (ref 3.4–10.8)

## 2023-03-08 ENCOUNTER — VBI (OUTPATIENT)
Dept: ADMINISTRATIVE | Facility: OTHER | Age: 63
End: 2023-03-08

## 2023-04-03 ENCOUNTER — OFFICE VISIT (OUTPATIENT)
Dept: FAMILY MEDICINE CLINIC | Facility: CLINIC | Age: 63
End: 2023-04-03

## 2023-04-03 VITALS
OXYGEN SATURATION: 98 % | HEIGHT: 64 IN | WEIGHT: 204 LBS | DIASTOLIC BLOOD PRESSURE: 78 MMHG | HEART RATE: 86 BPM | SYSTOLIC BLOOD PRESSURE: 120 MMHG | BODY MASS INDEX: 34.83 KG/M2

## 2023-04-03 DIAGNOSIS — Z12.31 ENCOUNTER FOR SCREENING MAMMOGRAM FOR BREAST CANCER: ICD-10-CM

## 2023-04-03 DIAGNOSIS — E66.09 CLASS 2 OBESITY DUE TO EXCESS CALORIES WITHOUT SERIOUS COMORBIDITY IN ADULT, UNSPECIFIED BMI: ICD-10-CM

## 2023-04-03 DIAGNOSIS — G60.9 IDIOPATHIC PERIPHERAL NEUROPATHY: ICD-10-CM

## 2023-04-03 DIAGNOSIS — S22.31XA CLOSED FRACTURE OF ONE RIB OF RIGHT SIDE, INITIAL ENCOUNTER: Primary | ICD-10-CM

## 2023-04-03 DIAGNOSIS — E78.2 MIXED HYPERLIPIDEMIA: ICD-10-CM

## 2023-04-03 DIAGNOSIS — I10 PRIMARY HYPERTENSION: ICD-10-CM

## 2023-04-03 PROBLEM — S22.32XD CLOSED FRACTURE OF ONE RIB OF LEFT SIDE WITH ROUTINE HEALING: Status: RESOLVED | Noted: 2022-12-08 | Resolved: 2023-04-03

## 2023-04-03 PROBLEM — R07.9 CHEST PAIN: Status: RESOLVED | Noted: 2021-12-13 | Resolved: 2023-04-03

## 2023-04-03 PROBLEM — S22.22XD CLOSED FRACTURE OF BODY OF STERNUM WITH ROUTINE HEALING: Status: RESOLVED | Noted: 2022-12-08 | Resolved: 2023-04-03
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
## 2023-04-04 NOTE — ASSESSMENT & PLAN NOTE
[facility] for weight loss    Patient is interested in pursuing medically supervised weight loss program   Referral provided to bariatrics

## 2023-04-04 NOTE — ASSESSMENT & PLAN NOTE
Related to DJD of lumbar spine  Normal blood flow    Weight loss would be beneficial   Patient is aware of this

## 2023-04-04 NOTE — ASSESSMENT & PLAN NOTE
Borderline cholesterol  Intolerant of statins in the past   Watch dietary intake of fats and cholesterol  Continue on red yeast rice    Repeat lipid panel in 6 months

## 2023-05-19 ENCOUNTER — APPOINTMENT (EMERGENCY)
Dept: RADIOLOGY | Facility: HOSPITAL | Age: 63
End: 2023-05-19

## 2023-05-19 ENCOUNTER — HOSPITAL ENCOUNTER (EMERGENCY)
Facility: HOSPITAL | Age: 63
Discharge: HOME | End: 2023-05-19
Attending: EMERGENCY MEDICINE

## 2023-05-19 VITALS
SYSTOLIC BLOOD PRESSURE: 135 MMHG | RESPIRATION RATE: 16 BRPM | DIASTOLIC BLOOD PRESSURE: 84 MMHG | OXYGEN SATURATION: 96 % | TEMPERATURE: 99.8 F | HEART RATE: 71 BPM

## 2023-05-19 DIAGNOSIS — S05.11XA TRAUMATIC ORBITAL HEMATOMA, RIGHT, INITIAL ENCOUNTER: ICD-10-CM
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
## 2023-05-19 DIAGNOSIS — S80.02XA CONTUSION OF LEFT KNEE, INITIAL ENCOUNTER: ICD-10-CM

## 2023-05-19 DIAGNOSIS — S01.81XA FACIAL LACERATION, INITIAL ENCOUNTER: ICD-10-CM

## 2023-05-19 RX ORDER — GINSENG 100 MG
1 CAPSULE ORAL ONCE
Status: COMPLETED | OUTPATIENT
Start: 2023-05-19 | End: 2023-05-19

## 2023-05-19 RX ORDER — ACETAMINOPHEN 325 MG/1
650 TABLET ORAL ONCE
Status: COMPLETED | OUTPATIENT
Start: 2023-05-19 | End: 2023-05-19

## 2023-05-19 RX ADMIN — BACITRACIN 1 SMALL APPLICATION: 500 OINTMENT TOPICAL at 16:45

## 2023-05-19 RX ADMIN — ACETAMINOPHEN 650 MG: 325 TABLET, FILM COATED ORAL at 17:32

## 2023-05-19 RX ADMIN — TETANUS TOXOID, REDUCED DIPHTHERIA TOXOID AND ACELLULAR PERTUSSIS VACCINE, ADSORBED 0.5 ML: 5; 2.5; 8; 8; 2.5 SUSPENSION INTRAMUSCULAR at 17:33

## 2023-05-19 NOTE — Clinical Note
Jayden Rodriguez was seen and treated in our emergency department on 5/19/2023  Diagnosis:     Yovani Anaya  may return to work on return date  She may return on this date: 05/22/2023         If you have any questions or concerns, please don't hesitate to call        Teri Mandel RN    ______________________________           _______________          _______________  Hospital Representative                              Date                                Time

## 2023-05-19 NOTE — DISCHARGE INSTRUCTIONS
Keep sutures dry    Topical antibiotic ointment daily and new dressing    Suture removal 5-7 days at your primary care provider     Return to ED for vomiting, lethargy, signs of wound infection

## 2023-05-19 NOTE — Clinical Note
Soraida Aldana was seen and treated in our emergency department on 5/19/2023  Diagnosis:     Soraida Neves  may return to work on return date  She may return on this date: 05/22/2023         If you have any questions or concerns, please don't hesitate to call        Grecia Godoy RN    ______________________________           _______________          _______________  Hospital Representative                              Date                                Time
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
## 2023-05-19 NOTE — ED CARE HANDOFF
Emergency Department Sign Out Note        Sign out and transfer of care from [facility]  See Separate Emergency Department note  The patient, Magan Ahuja, was evaluated by the previous provider for facial laceration  Workup Completed:  Blood work    ED Course / Workup Pending (followup):  CT head, CT facial bones                                  ED Course as of 05/19/23 1945   Fri May 19, 2023   5468 Patient can be discharged home after CT orbits are resulted with follow-up to PCP  Procedures  Medical Decision Making  Patient pending CT facial bones as well as CT head  CT head was unremarkable  CT facial bones showed a small superolateral orbital rim hematoma  Patient is currently in no acute distress  I discussed supportive care  Patient prescribed Neosporin for her laceration that was repaired by advanced practitioner  At this time patient is discharged home with follow-up to PCP as well as OMFS for further evaluation and management  Close return instructions given to return to the ER for any worsening symptoms  Patient agrees with discharge plan  Patient well appearing at time of discharge  Please Note: Fluency Direct voice recognition software may have been used in the creation of this document  Wrong words or sound a like substitutions may have occurred due to the inherent limitations of the voice software  Amount and/or Complexity of Data Reviewed  Radiology: ordered and independent interpretation performed  Risk  OTC drugs  Prescription drug management                Disposition  Final diagnoses:   Fall, initial encounter   Facial laceration, initial encounter   Contusion of left knee, initial encounter   Traumatic orbital hematoma, right, initial encounter     Time reflects when diagnosis was documented in both MDM as applicable and the Disposition within this note     Time User Action Codes Description Comment    5/19/2023  5:50 PM Kelsie, [address] [facility] Shanice Duncan Fall, initial encounter     5/19/2023  5:50 PM Kenan Pope Add [S01 81XA] Facial laceration, initial encounter     5/19/2023  5:50 PM ContilianoTammie Add [S80 02XA] Contusion of left knee, initial encounter     5/19/2023  6:51 PM Toshia Mesa Add [S05 [id number]] Traumatic orbital hematoma, right, initial encounter       ED Disposition     ED Disposition   Discharge    Condition   Stable    Date/Time   Fri May 19, 2023  7:41 PM    Comment   Hari Moore discharge to home/self care  Follow-up Information     Follow up With Specialties Details Why Contact Info    Jean Francisco DO Family Medicine Schedule an appointment as soon as possible for a visit in 2 days  [address]  [phone]      Dr Jed Jerry, [facility]  Schedule an appointment as soon as possible for a visit   [address] [place] [address]  [phone]        Patient's Medications   Discharge Prescriptions    NEOMYCIN-BACITRACIN-POLYMYXIN B (NEOSPORIN) OINTMENT    Apply topically 2 (two) times a day for 7 days       Start Date: 5/19/2023 End Date: 5/26/2023       Order Dose: --       Quantity: 15 g    Refills: 0     No discharge procedures on file         ED Provider  Electronically Signed by     Swati Jeter DO  05/19/23 1945

## 2023-05-19 NOTE — Clinical Note
Octavia Sneed was seen and treated in our emergency department on 5/19/2023  Diagnosis:     Samreen Hdz  may return to work on return date  She may return on this date: 05/23/2023         If you have any questions or concerns, please don't hesitate to call        Naomie Magallanes RN    ______________________________           _______________          _______________  Hospital Representative                              Date                                Time

## 2023-06-19 ENCOUNTER — OFFICE VISIT (OUTPATIENT)
Dept: FAMILY MEDICINE CLINIC | Facility: CLINIC | Age: 63
End: 2023-06-19
Payer: COMMERCIAL

## 2023-06-19 VITALS
BODY MASS INDEX: 33.97 KG/M2 | OXYGEN SATURATION: 97 % | WEIGHT: 199 LBS | HEART RATE: 93 BPM | RESPIRATION RATE: 16 BRPM | DIASTOLIC BLOOD PRESSURE: 82 MMHG | HEIGHT: 64 IN | SYSTOLIC BLOOD PRESSURE: 122 MMHG

## 2023-06-19 DIAGNOSIS — M25.512 ACUTE PAIN OF LEFT SHOULDER: ICD-10-CM

## 2023-06-19 DIAGNOSIS — M70.42 PREPATELLAR BURSITIS OF LEFT KNEE: Primary | ICD-10-CM

## 2023-06-19 PROCEDURE — 99214 OFFICE O/P EST MOD 30 MIN: CPT | Performed by: FAMILY MEDICINE

## 2023-06-19 RX ORDER — OXYCODONE HYDROCHLORIDE AND ACETAMINOPHEN 5; 325 MG/1; MG/1
TABLET ORAL
COMMUNITY
Start: 2023-05-25

## 2023-06-19 RX ORDER — AMOXICILLIN 500 MG/1
TABLET, FILM COATED ORAL
COMMUNITY
Start: 2023-05-25
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
## 2023-06-19 NOTE — ASSESSMENT & PLAN NOTE
Offered to aspirate left knee prepatellar bursa  Patient likely has 3 to 4 cc of fluid accumulation which is probably related to her kneeling and washing floors    -No need for imaging as patient did have imaging when at the ER last month which did not show any evidence of fracture    -Patient declined aspiration    If no improvement in swelling then the patient can return to the office for elective aspiration of the prepatellar bursa    -Advised patient to not be on her knees while cleaning the floor

## 2023-06-19 NOTE — ASSESSMENT & PLAN NOTE
Patient does have good range of motion    She has tenderness in the distal deltoid region at the insertion into the humerus which is muscular pain related to lifting her grandchild    -Advised patient that she can take over-the-counter NSAIDs or even use topical Voltaren gel which may help

## 2023-06-20 ENCOUNTER — HOSPITAL ENCOUNTER (OUTPATIENT)
Dept: RADIOLOGY | Facility: HOSPITAL | Age: 63
Discharge: HOME | End: 2023-06-20
Payer: COMMERCIAL

## 2023-06-20 DIAGNOSIS — Z12.31 ENCOUNTER FOR SCREENING MAMMOGRAM FOR BREAST CANCER: ICD-10-CM

## 2023-06-20 PROCEDURE — 77067 SCR MAMMO BI INCL CAD: CPT

## 2023-06-20 PROCEDURE — 77063 BREAST TOMOSYNTHESIS BI: CPT

## 2023-06-22 ENCOUNTER — TELEPHONE (OUTPATIENT)
Dept: FAMILY MEDICINE CLINIC | Facility: CLINIC | Age: 63
End: 2023-06-22

## 2023-06-22 NOTE — TELEPHONE ENCOUNTER
----- Message from Lala Brand DO sent at 6/22/2023  5:19 PM EDT -----  Normal screening mammogram   Repeat study in 1 year

## 2023-06-22 NOTE — TELEPHONE ENCOUNTER
Advised pt of her results  She wanted to know if you would be willing to do a cortisone injection for her shoulder pain and also she she would like to be referred to Ortho

## 2023-06-23 DIAGNOSIS — M25.512 LEFT SHOULDER PAIN, UNSPECIFIED CHRONICITY: Primary | ICD-10-CM

## 2023-06-23 NOTE — TELEPHONE ENCOUNTER
Placed order for orthopedic surgery  Steroid injection would not be helpful    Her pain is in the musculature and not within the joint

## 2023-07-05 ENCOUNTER — OFFICE VISIT (OUTPATIENT)
Dept: OBGYN CLINIC | Facility: CLINIC | Age: 63
End: 2023-07-05
Payer: COMMERCIAL

## 2023-07-05 ENCOUNTER — APPOINTMENT (OUTPATIENT)
Dept: RADIOLOGY | Facility: AMBULARY SURGERY CENTER | Age: 63
End: 2023-07-05
Attending: STUDENT IN AN ORGANIZED HEALTH CARE EDUCATION/TRAINING PROGRAM
Payer: COMMERCIAL

## 2023-07-05 VITALS
RESPIRATION RATE: 17 BRPM | HEIGHT: 64 IN | WEIGHT: 199 LBS | DIASTOLIC BLOOD PRESSURE: 85 MMHG | BODY MASS INDEX: 33.97 KG/M2 | HEART RATE: 71 BPM | SYSTOLIC BLOOD PRESSURE: 126 MMHG

## 2023-07-05 DIAGNOSIS — M75.32 CALCIFIC TENDINITIS OF LEFT SHOULDER: Primary | ICD-10-CM

## 2023-07-05 DIAGNOSIS — S22.31XA CLOSED FRACTURE OF ONE RIB OF RIGHT SIDE, INITIAL ENCOUNTER: ICD-10-CM

## 2023-07-05 DIAGNOSIS — M25.512 LEFT SHOULDER PAIN, UNSPECIFIED CHRONICITY: ICD-10-CM

## 2023-07-05 PROCEDURE — 20610 DRAIN/INJ JOINT/BURSA W/O US: CPT | Performed by: STUDENT IN AN ORGANIZED HEALTH CARE EDUCATION/TRAINING PROGRAM

## 2023-07-05 PROCEDURE — 73030 X-RAY EXAM OF SHOULDER: CPT

## 2023-07-05 PROCEDURE — 99204 OFFICE O/P NEW MOD 45 MIN: CPT | Performed by: STUDENT IN AN ORGANIZED HEALTH CARE EDUCATION/TRAINING PROGRAM

## 2023-07-05 RX ORDER — TRIAMCINOLONE ACETONIDE 40 MG/ML
40 INJECTION, SUSPENSION INTRA-ARTICULAR; INTRAMUSCULAR
Status: COMPLETED | OUTPATIENT
Start: 2023-07-05 | End: 2023-07-05

## 2023-07-05 RX ORDER — BUPIVACAINE HYDROCHLORIDE 2.5 MG/ML
4 INJECTION, SOLUTION INFILTRATION; PERINEURAL
Status: COMPLETED | OUTPATIENT
Start: 2023-07-05 | End: 2023-07-05

## 2023-07-05 RX ADMIN — TRIAMCINOLONE ACETONIDE 40 MG: 40 INJECTION, SUSPENSION INTRA-ARTICULAR; INTRAMUSCULAR at 14:15

## 2023-07-05 RX ADMIN — BUPIVACAINE HYDROCHLORIDE 4 ML: 2.5 INJECTION, SOLUTION INFILTRATION; PERINEURAL at 14:15
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
## 2023-07-07 DIAGNOSIS — K21.9 GERD WITHOUT ESOPHAGITIS: ICD-10-CM

## 2023-07-07 RX ORDER — ESOMEPRAZOLE MAGNESIUM 40 MG/1
CAPSULE, DELAYED RELEASE ORAL
Qty: 60 CAPSULE | Refills: 2 | Status: SHIPPED | OUTPATIENT
Start: 2023-07-07

## 2023-07-24 DIAGNOSIS — I10 PRIMARY HYPERTENSION: ICD-10-CM

## 2023-07-24 RX ORDER — METOPROLOL SUCCINATE 25 MG/1
TABLET, EXTENDED RELEASE ORAL
Qty: 90 TABLET | Refills: 1 | Status: SHIPPED | OUTPATIENT
Start: 2023-07-24

## 2023-08-26 DIAGNOSIS — K21.9 GERD WITHOUT ESOPHAGITIS: ICD-10-CM

## 2023-08-28 RX ORDER — ESOMEPRAZOLE MAGNESIUM 40 MG/1
CAPSULE, DELAYED RELEASE ORAL
Qty: 60 CAPSULE | Refills: 5 | Status: SHIPPED | OUTPATIENT
Start: 2023-08-28

## 2023-09-05 DIAGNOSIS — I83.90 VARICOSE VEINS OF LOWER EXTREMITY, UNSPECIFIED LATERALITY, UNSPECIFIED WHETHER COMPLICATED: Primary | ICD-10-CM

## 2023-09-22 ENCOUNTER — HOSPITAL ENCOUNTER (OUTPATIENT)
Dept: ULTRASOUND IMAGING | Facility: HOSPITAL | Age: 63
Discharge: HOME | End: 2023-09-22
Attending: STUDENT IN AN ORGANIZED HEALTH CARE EDUCATION/TRAINING PROGRAM

## 2023-10-05 ENCOUNTER — OFFICE VISIT (OUTPATIENT)
Dept: URGENT CARE | Facility: CLINIC | Age: 63
End: 2023-10-05
Payer: COMMERCIAL

## 2023-10-05 VITALS
OXYGEN SATURATION: 97 % | RESPIRATION RATE: 12 BRPM | TEMPERATURE: 97.6 F | HEART RATE: 74 BPM | WEIGHT: 203 LBS | BODY MASS INDEX: 35.4 KG/M2

## 2023-10-05 DIAGNOSIS — J01.90 ACUTE NON-RECURRENT SINUSITIS, UNSPECIFIED LOCATION: Primary | ICD-10-CM

## 2023-10-05 LAB — S PYO AG THROAT QL: NEGATIVE

## 2023-10-05 PROCEDURE — 87880 STREP A ASSAY W/OPTIC: CPT | Performed by: PHYSICIAN ASSISTANT

## 2023-10-05 PROCEDURE — 99203 OFFICE O/P NEW LOW 30 MIN: CPT | Performed by: PHYSICIAN ASSISTANT

## 2023-10-05 RX ORDER — AMOXICILLIN AND CLAVULANATE POTASSIUM 875; 125 MG/1; MG/1
1 TABLET, FILM COATED ORAL 2 TIMES DAILY WITH MEALS
Qty: 14 TABLET | Refills: 0 | Status: SHIPPED | OUTPATIENT
Start: 2023-10-05 | End: 2023-10-12
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
## 2023-10-16 ENCOUNTER — OFFICE VISIT (OUTPATIENT)
Dept: FAMILY MEDICINE CLINIC | Facility: CLINIC | Age: 63
End: 2023-10-16
Payer: COMMERCIAL

## 2023-10-16 VITALS
OXYGEN SATURATION: 98 % | BODY MASS INDEX: 34.66 KG/M2 | WEIGHT: 203 LBS | HEIGHT: 64 IN | HEART RATE: 72 BPM | DIASTOLIC BLOOD PRESSURE: 72 MMHG | SYSTOLIC BLOOD PRESSURE: 134 MMHG | RESPIRATION RATE: 16 BRPM

## 2023-10-16 DIAGNOSIS — M47.816 LUMBAR SPONDYLOSIS: ICD-10-CM

## 2023-10-16 DIAGNOSIS — E78.2 MIXED HYPERLIPIDEMIA: ICD-10-CM

## 2023-10-16 DIAGNOSIS — I10 PRIMARY HYPERTENSION: ICD-10-CM

## 2023-10-16 DIAGNOSIS — G60.9 IDIOPATHIC PERIPHERAL NEUROPATHY: ICD-10-CM

## 2023-10-16 DIAGNOSIS — M54.16 RADICULOPATHY OF LUMBAR REGION: ICD-10-CM

## 2023-10-16 DIAGNOSIS — E66.09 CLASS 2 OBESITY DUE TO EXCESS CALORIES WITHOUT SERIOUS COMORBIDITY IN ADULT, UNSPECIFIED BMI: ICD-10-CM

## 2023-10-16 DIAGNOSIS — Z00.00 ANNUAL PHYSICAL EXAM: Primary | ICD-10-CM

## 2023-10-16 PROCEDURE — 99396 PREV VISIT EST AGE 40-64: CPT | Performed by: FAMILY MEDICINE

## 2023-10-16 PROCEDURE — 99214 OFFICE O/P EST MOD 30 MIN: CPT | Performed by: FAMILY MEDICINE

## 2023-10-16 RX ORDER — DULOXETIN HYDROCHLORIDE 20 MG/1
20 CAPSULE, DELAYED RELEASE ORAL DAILY
Qty: 30 CAPSULE | Refills: 1 | Status: SHIPPED | OUTPATIENT
Start: 2023-10-16
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
## 2023-10-18 ENCOUNTER — TELEPHONE (OUTPATIENT)
Age: 63
End: 2023-10-18

## 2023-10-18 NOTE — ASSESSMENT & PLAN NOTE
Patient is interested in pursuing medically supervised weight loss program.  Referral again provided. Last time it was provided she did not follow through because she was still working.   She has now retired

## 2023-10-18 NOTE — ASSESSMENT & PLAN NOTE
Likely related related to DJD of the lumbar spine. Patient is hopefully losing some weight and will be seeing weight management program.  We will start patient on Cymbalta 20 mg once daily. Hopefully she does not experience side effects from this.   She does tend to have reactions to medications very easily

## 2023-10-18 NOTE — TELEPHONE ENCOUNTER
Caller: Silvia Rosales    Doctor: Dr. Morenita Jones    Reason for call: appt/checked chart/NP-scheduled with Dr Brian Baron as Dr Morenita Jones out so far for appts    Call back#: NA

## 2023-10-18 NOTE — ASSESSMENT & PLAN NOTE
Patient did see neurology. In the past she did have chiropractic treatments. Needs to find new chiropractor.   Recommendation provided

## 2023-10-19 ENCOUNTER — OFFICE VISIT (OUTPATIENT)
Age: 63
End: 2023-10-19
Payer: COMMERCIAL

## 2023-10-19 VITALS
SYSTOLIC BLOOD PRESSURE: 144 MMHG | HEIGHT: 63 IN | BODY MASS INDEX: 35.97 KG/M2 | WEIGHT: 203 LBS | DIASTOLIC BLOOD PRESSURE: 89 MMHG | HEART RATE: 71 BPM

## 2023-10-19 DIAGNOSIS — L60.0 INGROWN TOENAIL OF LEFT FOOT: ICD-10-CM

## 2023-10-19 DIAGNOSIS — L03.032 PARONYCHIA OF GREAT TOE OF LEFT FOOT: ICD-10-CM

## 2023-10-19 DIAGNOSIS — S22.31XA CLOSED FRACTURE OF ONE RIB OF RIGHT SIDE, INITIAL ENCOUNTER: Primary | ICD-10-CM

## 2023-10-19 PROCEDURE — 11750 EXCISION NAIL&NAIL MATRIX: CPT | Performed by: STUDENT IN AN ORGANIZED HEALTH CARE EDUCATION/TRAINING PROGRAM

## 2023-10-19 PROCEDURE — 99203 OFFICE O/P NEW LOW 30 MIN: CPT | Performed by: STUDENT IN AN ORGANIZED HEALTH CARE EDUCATION/TRAINING PROGRAM

## 2023-10-19 RX ORDER — CEPHALEXIN 500 MG/1
500 CAPSULE ORAL EVERY 6 HOURS SCHEDULED
Qty: 28 CAPSULE | Refills: 0 | Status: SHIPPED | OUTPATIENT
Start: 2023-10-19 | End: 2023-10-26

## 2023-10-19 NOTE — PROGRESS NOTES
This patient was seen on 10/19/23. My role is Foot , Ankle, and Wound Specialist    ASSESSMENT     Diagnoses and all orders for this visit:    Closed fracture of one rib of right side, initial encounter  -     DXA bone density spine hip and pelvis; Future    Paronychia of great toe of left foot  -     cephalexin (KEFLEX) 500 mg capsule; Take 1 capsule (500 mg total) by mouth every 6 (six) hours for 7 days  -     Nail removal    Ingrown toenail of left foot  -     Nail removal         Problem List Items Addressed This Visit    None  Visit Diagnoses       Closed fracture of one rib of right side, initial encounter    -  Primary    Relevant Orders    DXA bone density spine hip and pelvis    Paronychia of great toe of left foot        Relevant Medications    cephalexin (KEFLEX) 500 mg capsule    Other Relevant Orders    Nail removal    Ingrown toenail of left foot        Relevant Orders    Nail removal          PLAN  -Nail avulsion performed as below:  -Nail bed was dressed with bacitracin, Xeroform, DSD, 1 inch Coban  -Patient instructed to take bandage off in 24 hours, wash area lightly with warm soap and water, dry area thoroughly, apply bacitracin and Band-Aid daily x10 days.  -Rx Keflex given paronychia  -RTC 2 weeks, patient instructed to call our office for an earlier appointment should any extreme pain, redness, swelling, purulent drainage present. Nail removal    Date/Time: 10/19/2023 9:15 AM    Performed by: Cesilia Nunez DPM  Authorized by: Cesilia Nunez DPM    Patient location:  Clinic  Indications / Diagnosis:  Ingrown toenail left foot  Universal Protocol:  Consent: Verbal consent obtained. Risks and benefits: risks, benefits and alternatives were discussed  Consent given by: patient  Time out: Immediately prior to procedure a "time out" was called to verify the correct patient, procedure, equipment, support staff and site/side marked as required.   Patient understanding: patient states understanding of the procedure being performed  Patient identity confirmed: verbally with patient    Location:     Foot:  L big toe  Pre-procedure details:     Skin preparation:  Alcohol and Betadine  Anesthesia (see MAR for exact dosages): Anesthesia method:  Nerve block    Block location:  Left hallux    Block needle gauge:  25 G    Block anesthetic:  Lidocaine 1% w/o epi    Block technique:  Digital block with dorsomedial and dorsolateral entry points    Block injection procedure:  Introduced needle and incremental injection    Block outcome:  Anesthesia achieved  Nail Removal:     Nail removed:  Partial    Nail side:  Lateral    Nail bed sutured: no      Removed nail replaced and anchored: no    Trephination:     Subungual hematoma drained: no    Ingrown nail:     Wedge excision of skin: no      Nail matrix removed or ablated:  Partial  Nails trimmed:     Number of nails trimmed:  0  Post-procedure details:     Dressing:  Gauze roll, antibiotic ointment and 4x4 sterile gauze    Patient tolerance of procedure: Tolerated well, no immediate complications        SUBJECTIVE    Chief Complaint:       Patient ID: Matt Allen is a 61 y.o. female. 10/19/23: Sammi Magaña is a pleasant 27-year-old female who presents today with left great toe pain. She states that it has been bothering her for a few weeks. She has tried to cut the nail out but now it is very sore and throbbing. She has had previous ingrown nails to the left great toe in the past.  She states that it is red, swollen, with occasional drainage. The following portions of the patient's history were reviewed and updated as appropriate: allergies, current medications, past family history, past medical history, past social history, past surgical history and problem list.    Review of Systems   Constitutional: Negative. HENT: Negative. Respiratory: Negative. Cardiovascular: Negative. Gastrointestinal: Negative.     Musculoskeletal: Positive for myalgias. Skin:  Positive for color change. Neurological: Negative. OBJECTIVE      /89   Pulse 71   Ht 5' 3" (1.6 m) Comment: verbal  Wt 92.1 kg (203 lb) Comment: verbal  LMP 01/01/2016   BMI 35.96 kg/m²        Physical Exam  Constitutional:       Appearance: Normal appearance. HENT:      Head: Normocephalic and atraumatic. Eyes:      General:         Right eye: No discharge. Left eye: No discharge. Cardiovascular:      Rate and Rhythm: Normal rate and regular rhythm. Pulses:           Dorsalis pedis pulses are 2+ on the right side and 2+ on the left side. Posterior tibial pulses are 2+ on the right side and 2+ on the left side. Pulmonary:      Effort: Pulmonary effort is normal.      Breath sounds: Normal breath sounds. Skin:     General: Skin is warm. Capillary Refill: Capillary refill takes less than 2 seconds. Neurological:      Sensory: Sensation is intact. No sensory deficit. MSK:  -Pain on palpation of lateral aspect of left 1st digit  -No gross deformities noted  -Active range of motion lesser digits intact  -Manual muscle testing is 5/5 to all muscle compartments of the lower extremity  -Ankle dorsiflexion >10 degrees with knee extended, and knee flexed    Derm:  -lateral aspect of left 1st digit periungual tissue is erythematous, edematous, with mild serous drainage noted. The lateral portion of the digital nail can be seen under lapping the periungual tissue.   This is consistent with onychocryptosis and surrounding paronychia  -Digital nails x10 are WNL  -No noted interdigital maceration, peeling, malodor  -No calluses noted on exam

## 2023-10-30 LAB
ALBUMIN SERPL-MCNC: 4.2 G/DL (ref 3.9–4.9)
ALBUMIN/GLOB SERPL: 1.8 {RATIO} (ref 1.2–2.2)
ALP SERPL-CCNC: 88 IU/L (ref 44–121)
ALT SERPL-CCNC: 22 IU/L (ref 0–32)
AST SERPL-CCNC: 22 IU/L (ref 0–40)
BASOPHILS # BLD AUTO: 0 X10E3/UL (ref 0–0.2)
BASOPHILS NFR BLD AUTO: 1 %
BILIRUB SERPL-MCNC: 0.5 MG/DL (ref 0–1.2)
BUN SERPL-MCNC: 13 MG/DL (ref 8–27)
BUN/CREAT SERPL: 16 (ref 12–28)
CALCIUM SERPL-MCNC: 9.3 MG/DL (ref 8.7–10.3)
CHLORIDE SERPL-SCNC: 106 MMOL/L (ref 96–106)
CHOLEST SERPL-MCNC: 239 MG/DL (ref 100–199)
CO2 SERPL-SCNC: 25 MMOL/L (ref 20–29)
CREAT SERPL-MCNC: 0.8 MG/DL (ref 0.57–1)
EGFR: 83 ML/MIN/1.73
EOSINOPHIL # BLD AUTO: 0.1 X10E3/UL (ref 0–0.4)
EOSINOPHIL NFR BLD AUTO: 3 %
ERYTHROCYTE [DISTWIDTH] IN BLOOD BY AUTOMATED COUNT: 13 % (ref 11.7–15.4)
GLOBULIN SER-MCNC: 2.3 G/DL (ref 1.5–4.5)
GLUCOSE SERPL-MCNC: 89 MG/DL (ref 70–99)
HCT VFR BLD AUTO: 44.6 % (ref 34–46.6)
HDLC SERPL-MCNC: 54 MG/DL
HGB BLD-MCNC: 15 G/DL (ref 11.1–15.9)
IMM GRANULOCYTES # BLD: 0 X10E3/UL (ref 0–0.1)
IMM GRANULOCYTES NFR BLD: 0 %
LDLC SERPL CALC-MCNC: 145 MG/DL (ref 0–99)
LYMPHOCYTES # BLD AUTO: 1.9 X10E3/UL (ref 0.7–3.1)
LYMPHOCYTES NFR BLD AUTO: 34 %
MCH RBC QN AUTO: 29.8 PG (ref 26.6–33)
MCHC RBC AUTO-ENTMCNC: 33.6 G/DL (ref 31.5–35.7)
MCV RBC AUTO: 89 FL (ref 79–97)
MONOCYTES # BLD AUTO: 0.4 X10E3/UL (ref 0.1–0.9)
MONOCYTES NFR BLD AUTO: 7 %
NEUTROPHILS # BLD AUTO: 3.1 X10E3/UL (ref 1.4–7)
NEUTROPHILS NFR BLD AUTO: 55 %
PLATELET # BLD AUTO: 151 X10E3/UL (ref 150–450)
POTASSIUM SERPL-SCNC: 4.3 MMOL/L (ref 3.5–5.2)
PROT SERPL-MCNC: 6.5 G/DL (ref 6–8.5)
RBC # BLD AUTO: 5.04 X10E6/UL (ref 3.77–5.28)
SL AMB VLDL CHOLESTEROL CALC: 40 MG/DL (ref 5–40)
SODIUM SERPL-SCNC: 143 MMOL/L (ref 134–144)
TRIGL SERPL-MCNC: 222 MG/DL (ref 0–149)
TSH SERPL DL<=0.005 MIU/L-ACNC: 1.46 UIU/ML (ref 0.45–4.5)
WBC # BLD AUTO: 5.5 X10E3/UL (ref 3.4–10.8)

## 2023-11-02 ENCOUNTER — OFFICE VISIT (OUTPATIENT)
Age: 63
End: 2023-11-02
Payer: COMMERCIAL

## 2023-11-02 ENCOUNTER — TELEPHONE (OUTPATIENT)
Age: 63
End: 2023-11-02

## 2023-11-02 ENCOUNTER — TELEPHONE (OUTPATIENT)
Dept: FAMILY MEDICINE CLINIC | Facility: CLINIC | Age: 63
End: 2023-11-02

## 2023-11-02 VITALS
HEIGHT: 63 IN | SYSTOLIC BLOOD PRESSURE: 121 MMHG | WEIGHT: 203 LBS | BODY MASS INDEX: 35.97 KG/M2 | HEART RATE: 67 BPM | DIASTOLIC BLOOD PRESSURE: 84 MMHG

## 2023-11-02 DIAGNOSIS — L60.0 INGROWN TOENAIL OF LEFT FOOT: Primary | ICD-10-CM

## 2023-11-02 DIAGNOSIS — B35.1 ONYCHOMYCOSIS: ICD-10-CM

## 2023-11-02 PROCEDURE — 87220 TISSUE EXAM FOR FUNGI: CPT | Performed by: STUDENT IN AN ORGANIZED HEALTH CARE EDUCATION/TRAINING PROGRAM

## 2023-11-02 PROCEDURE — 99212 OFFICE O/P EST SF 10 MIN: CPT | Performed by: STUDENT IN AN ORGANIZED HEALTH CARE EDUCATION/TRAINING PROGRAM

## 2023-11-02 PROCEDURE — 87106 FUNGI IDENTIFICATION YEAST: CPT | Performed by: STUDENT IN AN ORGANIZED HEALTH CARE EDUCATION/TRAINING PROGRAM

## 2023-11-02 PROCEDURE — 87102 FUNGUS ISOLATION CULTURE: CPT | Performed by: STUDENT IN AN ORGANIZED HEALTH CARE EDUCATION/TRAINING PROGRAM

## 2023-11-02 NOTE — TELEPHONE ENCOUNTER
Spoke with pt, unable to find anything.   She said its ok it may have been earlier than records go back

## 2023-11-02 NOTE — PROGRESS NOTES
This patient was seen on 11/2/23. My role is Foot , Ankle, and Wound Specialist    ASSESSMENT     Diagnoses and all orders for this visit:    Ingrown toenail of left foot    Onychomycosis         Problem List Items Addressed This Visit    None  Visit Diagnoses       Ingrown toenail of left foot    -  Primary    Onychomycosis              PLAN  Plan  -Nailbed at location of nail avulsion examined with no local signs of infection, nailbed appears healthy, granular, with adequate healing.  -Biopsy of nail sent for fungal cultures and KOH prep.   -If the cultures come back positive, we will pursue a course of PO terbinafine  -Patient will require LFT prior to beginning treatment  -RTC in 6-weeks    SUBJECTIVE    Chief Complaint:  Discolored toenails     Patient ID: Mandeep Das is a 61 y.o. female. 10/19/23: Jen Jane is a pleasant 77-year-old female who presents today with left great toe pain. She states that it has been bothering her for a few weeks. She has tried to cut the nail out but now it is very sore and throbbing. She has had previous ingrown nails to the left great toe in the past.  She states that it is red, swollen, with occasional drainage. 11/2/2023: Jen Jaen states that her left hallux is feeling much better at today's visit, she states that there is still some redness and very mild pain at the base of the nail, however she believes this is healing. She is also now concerned about discoloration to her left third digital nail and right hallux digital nail. She is wondering if this might be toenail fungus toenail fungus and would be willing to  Treat this if applicable        The following portions of the patient's history were reviewed and updated as appropriate: allergies, current medications, past family history, past medical history, past social history, past surgical history and problem list.    Review of Systems   Constitutional: Negative. HENT: Negative. Respiratory: Negative. Cardiovascular: Negative. Gastrointestinal: Negative. Musculoskeletal: Negative. Skin:  Positive for color change. Neurological: Negative. OBJECTIVE      /84   Pulse 67   Ht 5' 3" (1.6 m)   Wt 92.1 kg (203 lb)   LMP 01/01/2016   BMI 35.96 kg/m²        Physical Exam  Constitutional:       Appearance: Normal appearance. HENT:      Head: Normocephalic and atraumatic. Eyes:      General:         Right eye: No discharge. Left eye: No discharge. Cardiovascular:      Rate and Rhythm: Normal rate and regular rhythm. Pulses:           Dorsalis pedis pulses are 2+ on the right side and 2+ on the left side. Posterior tibial pulses are 2+ on the right side and 2+ on the left side. Pulmonary:      Effort: Pulmonary effort is normal.      Breath sounds: Normal breath sounds. Skin:     General: Skin is warm. Capillary Refill: Capillary refill takes less than 2 seconds. Neurological:      Sensory: Sensation is intact. No sensory deficit. Vascular:  -DP and PT pulses intact b/l  -Capillary refill time <2 sec b/l    MSK:  -Pain on palpation negative  -No gross deformities noted   -MMT is 5/5 to all muscle compartments of the lower extremity    Neuro:  -Light sensation intact bilaterally  -Protective sensation intact bilaterally    Derm:  -No lesions, abrasions, or open wounds noted  -No noted interdigital maceration, peeling, malodor  -Patient's toe nails x2 are elongated, thickened, discolored, and dystrophic with subungual debris: These findings are consistent with onychomycosis   -Nailbed at location of nail avulsion examined with no local signs of infection, nailbed appears healthy, granular, with adequate healing.

## 2023-11-02 NOTE — TELEPHONE ENCOUNTER
----- Message from Florie Crigler, DO sent at 11/2/2023  1:55 PM EDT -----  Please call the patient regarding her abnormal result. All of her labs are essentially normal with the exception of her cholesterol which is once again high. It is higher than it has been in the last 2 years.   I would recommend seeing a cardiologist who deals with lipids since she has been intolerant of statins in the past.  If she is willing to do so then I will place an order

## 2023-11-03 LAB — KOH PREP SPEC: NORMAL

## 2023-11-06 DIAGNOSIS — E78.2 MIXED HYPERLIPIDEMIA: Primary | ICD-10-CM

## 2023-11-06 DIAGNOSIS — Z78.9 STATIN INTOLERANCE: ICD-10-CM

## 2023-11-06 LAB — FUNGUS SPEC CULT: NORMAL

## 2023-11-15 LAB — FUNGUS SPEC CULT: ABNORMAL

## 2023-11-29 ENCOUNTER — TELEPHONE (OUTPATIENT)
Age: 63
End: 2023-11-29

## 2023-11-29 DIAGNOSIS — T36.7X5A ADVERSE EFFECT OF TERBINAFINE: ICD-10-CM

## 2023-11-29 DIAGNOSIS — B35.1 ONYCHOMYCOSIS: Primary | ICD-10-CM

## 2023-11-29 NOTE — TELEPHONE ENCOUNTER
Caller: Ingrid De La Garza    Doctor/Office: Dr. Trevor Soto    #: [phone]    Escalation: Medication/test results came back positive for fungus so is asking for RX to be sent to [facility] in [place]. Please call pt back to let her know this was done.  Thanks

## 2023-12-05 ENCOUNTER — TELEPHONE (OUTPATIENT)
Age: 63
End: 2023-12-05

## 2023-12-05 NOTE — TELEPHONE ENCOUNTER
Caller: Merline Sloop Schisler    Doctor/Office: /[facility]#: [phone]    Escalation: Care Patient saw positive fungus results in her mychart. Should she be on meds? Please return call and advise, and call in script if needed.  Thank you

## 2024-01-08 ENCOUNTER — CONSULT (OUTPATIENT)
Dept: CARDIOLOGY CLINIC | Facility: CLINIC | Age: 64
End: 2024-01-08
Payer: COMMERCIAL

## 2024-01-08 VITALS
SYSTOLIC BLOOD PRESSURE: 110 MMHG | HEIGHT: 63 IN | WEIGHT: 206 LBS | HEART RATE: 70 BPM | OXYGEN SATURATION: 97 % | DIASTOLIC BLOOD PRESSURE: 74 MMHG | BODY MASS INDEX: 36.5 KG/M2

## 2024-01-08 DIAGNOSIS — E78.2 MIXED HYPERLIPIDEMIA: Primary | ICD-10-CM

## 2024-01-08 DIAGNOSIS — Z78.9 STATIN INTOLERANCE: ICD-10-CM

## 2024-01-08 PROCEDURE — 93000 ELECTROCARDIOGRAM COMPLETE: CPT | Performed by: INTERNAL MEDICINE

## 2024-01-08 PROCEDURE — 99215 OFFICE O/P EST HI 40 MIN: CPT | Performed by: INTERNAL MEDICINE
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
## 2024-01-12 ENCOUNTER — TELEPHONE (OUTPATIENT)
Dept: BARIATRICS | Facility: CLINIC | Age: 64
End: 2024-01-12

## 2024-01-18 ENCOUNTER — CONSULT (OUTPATIENT)
Dept: BARIATRICS | Facility: CLINIC | Age: 64
End: 2024-01-18
Payer: COMMERCIAL

## 2024-01-18 VITALS
BODY MASS INDEX: 36.21 KG/M2 | SYSTOLIC BLOOD PRESSURE: 116 MMHG | DIASTOLIC BLOOD PRESSURE: 82 MMHG | HEIGHT: 63 IN | WEIGHT: 204.4 LBS | HEART RATE: 81 BPM

## 2024-01-18 DIAGNOSIS — E66.09 CLASS 2 OBESITY DUE TO EXCESS CALORIES WITHOUT SERIOUS COMORBIDITY IN ADULT, UNSPECIFIED BMI: ICD-10-CM

## 2024-01-18 PROCEDURE — 99204 OFFICE O/P NEW MOD 45 MIN: CPT | Performed by: INTERNAL MEDICINE
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
## 2024-01-18 NOTE — ASSESSMENT & PLAN NOTE
-Patient to start with a conservative program  -Reviewed all the programs and antiobesity medications with patient.  She would like to think about all her options and make a decision regarding follow-up with RD either through the healthy core or conservative program.  -Patient reports she is doing weight watchers currently.  Still discussed the importance of being in a calorie deficit.  Reports she has used my fitness pal in the past.  Recommended getting back to tracking  -Discussed side effects of all the medicines.  Patient reports that she is sensitive to stimulants and does not even take coffee as a result.  Therefore discussed that phentermine and Qsymia may not be a good option for her.  We could consider topiramate.  Ordered fasting insulin to see if any evidence of insulin resistance and we could consider metformin as an option.  Contrave/bupropion/naltrexone are also options.  Per insurance coverage-it is possible that patient may not be able to obtain GLP-1's.    Discussed low availability of some injectable GLP-1s at the current time which is subject to change  Requested patient check with her insurance company if injectables are a covered benefit for them and if preferred pharmacy carries medication  Also counseled that weight regain may occur on stopping medication and it therefore may need to be continued as a weight maintenance medication long term if well tolerated.   Patient requested to consider all the  factors outlined below before making an informed decision regarding initiating anti obesity pharmacotherapy.    -Patient provided with a rough calorie target of 9218-9888 with adequate protein spaced throughout the day with meals and snacks.  -STOP-BANG score 5/8.  Would benefit from a sleep study.  -Reports food related behaviors such as hunger cravings snacking grazing emotional eating and trouble controlling portion sizes.  Recommended virtual support groups.  -

## 2024-01-30 DIAGNOSIS — I10 PRIMARY HYPERTENSION: ICD-10-CM

## 2024-01-30 RX ORDER — METOPROLOL SUCCINATE 25 MG/1
TABLET, EXTENDED RELEASE ORAL
Qty: 90 TABLET | Refills: 1 | Status: SHIPPED | OUTPATIENT
Start: 2024-01-30

## 2024-02-05 ENCOUNTER — TELEPHONE (OUTPATIENT)
Dept: CARDIOLOGY CLINIC | Facility: CLINIC | Age: 64
End: 2024-02-05

## 2024-02-05 ENCOUNTER — APPOINTMENT (OUTPATIENT)
Dept: LAB | Facility: HOSPITAL | Age: 64
End: 2024-02-05
Attending: STUDENT IN AN ORGANIZED HEALTH CARE EDUCATION/TRAINING PROGRAM
Payer: COMMERCIAL

## 2024-02-05 DIAGNOSIS — E78.2 MIXED HYPERLIPIDEMIA: Primary | ICD-10-CM

## 2024-02-05 DIAGNOSIS — T36.7X5A ADVERSE EFFECT OF TERBINAFINE: ICD-10-CM

## 2024-02-05 DIAGNOSIS — B35.1 ONYCHOMYCOSIS: ICD-10-CM

## 2024-02-05 DIAGNOSIS — E66.09 CLASS 2 OBESITY DUE TO EXCESS CALORIES WITHOUT SERIOUS COMORBIDITY IN ADULT, UNSPECIFIED BMI: ICD-10-CM

## 2024-02-05 DIAGNOSIS — Z78.9 STATIN INTOLERANCE: ICD-10-CM

## 2024-02-05 DIAGNOSIS — E78.2 MIXED HYPERLIPIDEMIA: ICD-10-CM

## 2024-02-05 LAB
25(OH)D3 SERPL-MCNC: 18.3 NG/ML (ref 30–100)
ALBUMIN SERPL BCP-MCNC: 4.1 G/DL (ref 3.5–5)
ALP SERPL-CCNC: 82 U/L (ref 34–104)
ALT SERPL W P-5'-P-CCNC: 26 U/L (ref 7–52)
AST SERPL W P-5'-P-CCNC: 21 U/L (ref 13–39)
BILIRUB DIRECT SERPL-MCNC: 0.09 MG/DL (ref 0–0.2)
BILIRUB SERPL-MCNC: 0.55 MG/DL (ref 0.2–1)
CHOLEST SERPL-MCNC: 232 MG/DL
EST. AVERAGE GLUCOSE BLD GHB EST-MCNC: 111 MG/DL
HBA1C MFR BLD: 5.5 %
HDLC SERPL-MCNC: 53 MG/DL
INSULIN SERPL-ACNC: 6.2 UIU/ML (ref 1.9–23)
LDLC SERPL CALC-MCNC: 139 MG/DL (ref 0–100)
PROT SERPL-MCNC: 6.9 G/DL (ref 6.4–8.4)
TRIGL SERPL-MCNC: 198 MG/DL

## 2024-02-05 PROCEDURE — 83525 ASSAY OF INSULIN: CPT

## 2024-02-05 PROCEDURE — 82306 VITAMIN D 25 HYDROXY: CPT

## 2024-02-05 PROCEDURE — 36415 COLL VENOUS BLD VENIPUNCTURE: CPT

## 2024-02-05 PROCEDURE — 83036 HEMOGLOBIN GLYCOSYLATED A1C: CPT

## 2024-02-05 PROCEDURE — 80061 LIPID PANEL: CPT

## 2024-02-05 PROCEDURE — 80076 HEPATIC FUNCTION PANEL: CPT

## 2024-02-05 NOTE — TELEPHONE ENCOUNTER
Patient notified of results.   She is going to weight management, and will discuss low carb and cholesterol diet

## 2024-02-05 NOTE — TELEPHONE ENCOUNTER
----- Message from Wanda Danielle MD sent at 2/5/2024 11:21 AM EST -----  Please call and inform patient that the blood test showed that cholesterol both triglycerides and LDL are elevated with slight improvement compared to previous labs,.  liver enzymes are normal.  Continue current medications  Low carbohydrate and low-cholesterol diet  Recommend repeat lipid profile and Liver Enzymes/Hepatic Panel prior to next office visit

## 2024-02-05 NOTE — PROGRESS NOTES
"Weight Management Medical Nutrition Assessment  Lennie is here today for [facility] initial visit. Current weight 206#. Has extensive history of weight loss efforts. Has been doing weight watchers currently, and recently cut out soda and milk due to GI concerns. Patient expressed GI concerns with a variety of foods and associated them with either intolerance, reflux, or anxiety. Stated that she struggles with stress and emotional eating triggered by feelings of anxiety as well. Per dietary recall, suspect excess calories from large portion grazing/mindless eating and low protein intake leading to poor satiety. Patient will return to using ARIPal log. Macronutrient goals provided. Physical activity goal was set today to walk 2-3x/week for 15-20 minutes. Low-calorie meal plan reviewed. Completed a body composition using SECA scale and will review results with patient at Month 1 f/u.     Patient seen by Medical Provider in past 6 months:  yes  Requested to schedule appointment with Medical Provider: No    Anthropometric Measurements  Start Weight (#): 206# (24)  Current Weight (#): n/a  TBW % Change from start weight: n/a  Ideal Body Weight (#): 115# (63\")  Goal Weight (#): ST-10% LT#    Weight Loss History  Previous weight loss attempts: Commercial Programs (Weight Watchers, [employer], etc.)  Exercise, Self Created Diets (Portion Control, Healthy Food Choices, etc.)    Food and Nutrition Related History  Wake up: 8 am    Bed Time: --  Sleep quality: averages 8 hours, struggles with reflux     Dietary Recall  Breakfast (9-10 am): oatmeal (made w/ almond milk, water, and equal)   Snack: grazing throughout the day (fruit or potato chips w/ sour cream or chocolate)   Lunch: sandwich (tuna or deli meat or egg salad) Or leftovers  Snack: grazing throughout the day (fruit or potato chips w/ sour cream or chocolate)   Dinner (7-8 pm): protein/vegetable/carb Or steak/sausage/chicken/fish  Snack: cereal, " cookies, candy/chocolate      Beverages: water, no caffeine (sensitive), alcohol (1x/week)   Volume of beverage intake: 16 oz water     Weekends: Same (retired)   Cravings: carbs/sweets   Trouble area of day: evenings and grazing throughout the day     Frequency of Eating out: 1x/week (not often)   Food restrictions: avoids fluid milk (lactose intolerance but okay with yogurt + cheese)   Cooking: self  Food Shopping: self    Occupation: retired      Physical Activity  Activity: No formal routine (enjoys walking)   Frequency: rarely   Physical limitations/barriers to exercise: herniated/bulging disc in neck/back     Estimated Needs  Energy  SECA: BMR: 1577 X 1.4 -1,000 = 1208 kcal     [facility] Energy Needs (needs at 204.4#)  BMR: 1,451 kcal  Maintenance calories (sedentary): 1,742 kcal  1-2#/week loss (sedentary): 742-1,242 kcal  1-2#/week loss (light activity): 995-1,495 kcal    Protein: 63-78 grams (1.2-1.5g/kg IBW)  Fluid: 61 ounces (35mL/kg IBW)    Nutrition Diagnosis  Yes;    Overweight/obesity related to Excess energy intake as evidenced by BMI more than normative standard for age and sex (obesity-grade II 35-39.9)     Nutrition Intervention    Nutrition Prescription  Calories: 1,100-1,300 kcal  Protein: 63-78 g  Fluid: 61+ ounces    Meal Plan (Dami/Pro)  Breakfast: 200-250/15-20  Snack: 100/5+  Lunch: 300/25  Snack: 100-150/5+  Dinner: 350-400/30  Snack: 100-150/5+    Nutrition Education  Healthy Core Manual   Calorie controlled menu  Lean protein food choices  Healthy snack options  Food journaling tips    Nutrition Counseling  Strategies: meal planning, portion sizes, healthy snack choices, hydration, fiber intake, protein intake, exercise, food logging    Monitoring and Evaluation:    Evaluation criteria  Energy Intake  Meet protein needs  Maintain adequate hydration  Monitor weekly weight  Meal planning/preparation  Food journal   Decreased portions at mealtimes and snacks  Physical  activity     Barriers to change:cognitive  Readiness to change: Preparation:  (Getting ready to change)   Comprehension: good  Expected Compliance: good

## 2024-02-06 ENCOUNTER — CLINICAL SUPPORT (OUTPATIENT)
Dept: BARIATRICS | Facility: CLINIC | Age: 64
End: 2024-02-06

## 2024-02-06 VITALS — HEIGHT: 63 IN | WEIGHT: 206 LBS | BODY MASS INDEX: 36.5 KG/M2

## 2024-02-06 DIAGNOSIS — R63.5 ABNORMAL WEIGHT GAIN: Primary | ICD-10-CM

## 2024-02-06 PROCEDURE — WMPRO12

## 2024-02-06 PROCEDURE — RECHECK

## 2024-02-07 DIAGNOSIS — E55.9 VITAMIN D DEFICIENCY: Primary | ICD-10-CM

## 2024-02-07 RX ORDER — ERGOCALCIFEROL 1.25 MG/1
50000 CAPSULE ORAL WEEKLY
Qty: 8 CAPSULE | Refills: 0 | Status: SHIPPED | OUTPATIENT
Start: 2024-02-07

## 2024-02-12 ENCOUNTER — CLINICAL SUPPORT (OUTPATIENT)
Dept: BARIATRICS | Facility: CLINIC | Age: 64
End: 2024-02-12

## 2024-02-12 VITALS — WEIGHT: 201.8 LBS | BODY MASS INDEX: 35.75 KG/M2

## 2024-02-12 DIAGNOSIS — R63.5 ABNORMAL WEIGHT GAIN: Primary | ICD-10-CM

## 2024-02-12 PROCEDURE — RECHECK
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
## 2024-02-13 ENCOUNTER — CLINICAL SUPPORT (OUTPATIENT)
Dept: BARIATRICS | Facility: CLINIC | Age: 64
End: 2024-02-13

## 2024-02-13 DIAGNOSIS — E55.9 VITAMIN D DEFICIENCY: ICD-10-CM

## 2024-02-13 DIAGNOSIS — R63.5 ABNORMAL WEIGHT GAIN: Primary | ICD-10-CM

## 2024-02-13 PROCEDURE — RECHECK

## 2024-02-14 VITALS — HEIGHT: 63 IN | BODY MASS INDEX: 35.75 KG/M2

## 2024-02-20 ENCOUNTER — CLINICAL SUPPORT (OUTPATIENT)
Dept: BARIATRICS | Facility: CLINIC | Age: 64
End: 2024-02-20

## 2024-02-20 VITALS — WEIGHT: 198.7 LBS | BODY MASS INDEX: 35.21 KG/M2 | HEIGHT: 63 IN

## 2024-02-20 DIAGNOSIS — R63.5 ABNORMAL WEIGHT GAIN: Primary | ICD-10-CM

## 2024-02-20 PROCEDURE — RECHECK

## 2024-02-21 PROBLEM — Z01.419 ENCOUNTER FOR GYNECOLOGICAL EXAMINATION (GENERAL) (ROUTINE) WITHOUT ABNORMAL FINDINGS: Status: RESOLVED | Noted: 2018-10-08 | Resolved: 2024-02-21

## 2024-02-23 NOTE — PROGRESS NOTES
"Weight Management Medical Nutrition Assessment  Lennie is here today for Healthy Core Month 1 f/u. Current Weight: 198.3#. Patient has lost 7.7# x 3 weeks. She has made excellent lifestyle changes such as food logging, increasing protein intake, and joining [employer] program. Logging consistently at 1,300 kcal and is pre-logging snacks. She did dine out over the weekend and although she went in with a plan that aligned with her goals, she struggled to implement. She then felt she \"already messed it up\" and had several snacks after dinner when getting home that she previously \"couldn't have\". Discussed importance of sustainability and having mindful portions of fun foods to reduce feelings or restriction and bingeing. Discussed all-or-nothing thinking mentality. SECA body comp reviewed. Encouraged patient to discuss with fitness center upper body exercises to maintain lean muscle mass in upper body. Next goal weight set to 195#. Keep up the good work!    Patient seen by Medical Provider in past 6 months:  yes  Requested to schedule appointment with Medical Provider: No    Anthropometric Measurements  Start Weight (#): 206# (24)  Current Weight (#): 198.3#  TBW % Change from start weight: 3.7%  Ideal Body Weight (#): 115# (63\")  Goal Weight (#): ST-10% LT#    Weight Loss History  Previous weight loss attempts: Commercial Programs (Weight Watchers, Adore Me, etc.)  Exercise, Self Created Diets (Portion Control, Healthy Food Choices, etc.)    Food and Nutrition Related History  Wake up: 8 am    Bed Time: --  Sleep quality: averages 8 hours, struggles with reflux     Dietary Recall  Breakfast (9-10 am): 1/4 cup oatmeal w/ 2 eggs    Snack: -- Or 1/2 apple (previously was grazing on high sugar foods)  Lunch (1:30 pm): sandwich (tuna or deli meat or egg salad and has been adding vegetables) Or leftovers  Snack (5 pm): 1/2 scoop pure protein powder w/ 1 cup almond milk Or pure protein bar   Dinner (7-8 pm): " protein/vegetable/carb Or steak/sausage/chicken/fish  Snack: cereal, cookies, candy/chocolate      Beverages: water, no caffeine (sensitive), alcohol (1x/week)   Volume of beverage intake: 16 oz water (working to increase)     Weekends: Same (retired)   Cravings: carbs/sweets   Trouble area of day: evenings and grazing throughout the day     Frequency of Eating out: 1x/week (not often)   Food restrictions: avoids fluid milk (lactose intolerance but okay with yogurt + cheese)   Cooking: self  Food Shopping: self    Occupation: retired      Physical Activity  Activity: Joined [facility] program  Frequency: 2x/week   Physical limitations/barriers to exercise: herniated/bulging disc in neck/back     Estimated Needs  Energy  SECA: BMR: 1,577 X 1.4 -1,000 = 1,208 kcal     [facility] Energy Needs (needs at 204.4#)  BMR: 1,451 kcal  Maintenance calories (sedentary): 1,742 kcal  1-2#/week loss (sedentary): 742-1,242 kcal  1-2#/week loss (light activity): 995-1,495 kcal    Protein: 63-78 grams (1.2-1.5g/kg IBW)  Fluid: 61 ounces (35mL/kg IBW)    Nutrition Diagnosis  Yes;    Overweight/obesity related to Excess energy intake as evidenced by BMI more than normative standard for age and sex (obesity-grade II 35-39.9)     Nutrition Intervention    Nutrition Prescription  Calories: 1,100-1,300 kcal  Protein: 63-78 g  Fluid: 61+ ounces    Meal Plan (Dami/Pro)  Breakfast: 200-250/15-20  Snack: 100/5+  Lunch: 300/25  Snack: 100-150/5+  Dinner: 350-400/30  Snack: 100-150/5+    Nutrition Education  Healthy Core Manual   Calorie controlled menu  Lean protein food choices  Healthy snack options  Food journaling tips    Nutrition Counseling  Strategies: meal planning, portion sizes, healthy snack choices, hydration, fiber intake, protein intake, exercise, food logging    Monitoring and Evaluation:    Evaluation criteria  Energy Intake  Meet protein needs  Maintain adequate hydration  Monitor weekly weight  Meal  planning/preparation  Food journal   Decreased portions at mealtimes and snacks  Physical activity     Barriers to change:cognitive  Readiness to change: Action:  (Changing behavior)  Comprehension: good  Expected Compliance: good

## 2024-02-27 ENCOUNTER — CLINICAL SUPPORT (OUTPATIENT)
Dept: BARIATRICS | Facility: CLINIC | Age: 64
End: 2024-02-27

## 2024-02-27 VITALS — WEIGHT: 198.3 LBS | HEIGHT: 63 IN | BODY MASS INDEX: 35.14 KG/M2

## 2024-02-27 VITALS — BODY MASS INDEX: 35.14 KG/M2 | HEIGHT: 63 IN | WEIGHT: 198.3 LBS

## 2024-02-27 DIAGNOSIS — R63.5 ABNORMAL WEIGHT GAIN: Primary | ICD-10-CM

## 2024-02-27 PROCEDURE — RECHECK

## 2024-03-05 ENCOUNTER — CLINICAL SUPPORT (OUTPATIENT)
Dept: BARIATRICS | Facility: CLINIC | Age: 64
End: 2024-03-05

## 2024-03-05 VITALS — WEIGHT: 196.9 LBS | HEIGHT: 63 IN | BODY MASS INDEX: 34.89 KG/M2

## 2024-03-05 DIAGNOSIS — R63.5 ABNORMAL WEIGHT GAIN: Primary | ICD-10-CM

## 2024-03-05 PROCEDURE — RECHECK

## 2024-03-06 DIAGNOSIS — B35.1 ONYCHOMYCOSIS: Primary | ICD-10-CM

## 2024-03-06 RX ORDER — TERBINAFINE HYDROCHLORIDE 250 MG/1
250 TABLET ORAL DAILY
Qty: 84 TABLET | Refills: 0 | Status: SHIPPED | OUTPATIENT
Start: 2024-03-06 | End: 2024-05-29

## 2024-03-12 ENCOUNTER — TELEPHONE (OUTPATIENT)
Age: 64
End: 2024-03-12

## 2024-03-12 ENCOUNTER — CLINICAL SUPPORT (OUTPATIENT)
Dept: BARIATRICS | Facility: CLINIC | Age: 64
End: 2024-03-12

## 2024-03-12 VITALS — BODY MASS INDEX: 34.41 KG/M2 | HEIGHT: 63 IN | WEIGHT: 194.2 LBS

## 2024-03-12 DIAGNOSIS — R63.5 ABNORMAL WEIGHT GAIN: Primary | ICD-10-CM

## 2024-03-12 PROCEDURE — RECHECK

## 2024-03-12 NOTE — TELEPHONE ENCOUNTER
Pt. Called in and would like to know if she is going to require lab work prior to her up coming appt. Please advise as she will need to  order.

## 2024-03-18 ENCOUNTER — OFFICE VISIT (OUTPATIENT)
Dept: BARIATRICS | Facility: CLINIC | Age: 64
End: 2024-03-18
Payer: COMMERCIAL

## 2024-03-18 VITALS
BODY MASS INDEX: 34.76 KG/M2 | HEART RATE: 70 BPM | HEIGHT: 63 IN | DIASTOLIC BLOOD PRESSURE: 78 MMHG | SYSTOLIC BLOOD PRESSURE: 114 MMHG | WEIGHT: 196.2 LBS

## 2024-03-18 DIAGNOSIS — E66.9 OBESITY, CLASS I, BMI 30-34.9: Primary | ICD-10-CM

## 2024-03-18 DIAGNOSIS — R53.83 FATIGUE: ICD-10-CM

## 2024-03-18 PROCEDURE — 99215 OFFICE O/P EST HI 40 MIN: CPT | Performed by: INTERNAL MEDICINE

## 2024-03-18 NOTE — ASSESSMENT & PLAN NOTE
Continue following Nutrition Prescription  Calories: 1,100-1,300 kcal  Protein: 63-78 g  Fluid: 61+ ounces    Patient reports dizziness and fatigue-Cut back consider cutting back on the metoprolol to half tablet a day  -Metoprolol itself has a weight promoting property, requested with patient to discuss with PCP about switching you to a different agent; metoprolol typically has to be weaned and cannot be stopped abruptly  -Patient reports she takes it for elevated heart rate.  Could consider low-dose Coreg as a more weight neutral.  -Also initiating workup for fatigue checking CBC iron panel ferritin B12 and RBC folate.  She has initiated weekly high-dose vitamin D  -With weight loss blood pressure will be lower so it is possible that the dizziness is related to low blood pressure  -Consider home sleep study as STOP-BANG score is 5/8  -Discussed medications with patient and different options including injectables and orals. Patient would like to hold off on this decision for now and continue lifestyle measures  -Continue lifestyle measures until patient feels like she wants to introduce medications for management of weight plateau.  Please inquire with insurance regarding coverage.

## 2024-03-18 NOTE — PROGRESS NOTES
Program: Healthy core    Assessment/Plan     Jacinta Moore  is a 63 y.o. female with  returns to follow up  for treatment of excess body weight and associated risk factors/co-morbidities.     Class 2 obesity due to excess calories without serious comorbidity in adult  -Congratulated patient on 9 pound weight loss in 8 weeks  Continue following Nutrition Prescription  Calories: 1,100-1,300 kcal  Protein: 63-78 g  Fluid: 61+ ounces  -Continue working with RD to help with support sustaining lifestyle changes as she finds benefit in being self-aware with caloric intake via tracking  -Continue excellent workout routine at the gym  -Patient reports dizziness and fatigue after workout and finding it hard to recover-Cut back consider cutting back on the metoprolol to half tablet a day  -Metoprolol itself has a weight promoting property, requested with patient to discuss with PCP about switching you to a different agent; metoprolol typically has to be weaned and cannot be stopped abruptly  -Patient reports she takes it for elevated heart rate.  Could consider low-dose Coreg as a more weight neutral.  -Also initiating workup for fatigue checking CBC iron panel ferritin B12 and RBC folate.  She has initiated weekly high-dose vitamin D  -With weight loss blood pressure will be lower so it is possible that the dizziness is related to low blood pressure  -Consider home sleep study as STOP-BANG score is 5/8  -Discussed medications with patient and different options including injectables and orals. Patient would like to hold off on this decision for now and continue lifestyle measures  -Continue lifestyle measures until patient feels like she wants to introduce medications for management of weight plateau.  Please inquire with insurance regarding coverage.       Most recent notes and records were reviewed.           Return visit:  3 months     Nutrition   Do not skip meals. Avoid sugary beverages. At least 64oz of water  daily.    Behavioral/Stress   Food log via nayana or provided paper log (nayana options include [email or web address], sparkpeople.com, SpeechCycle.com, calorieking.com, Dynamic Yield). Encouraged mindful eating    Physical Activity  Increase physical activity by 10 minutes daily. Gradually increase physical activity to a goal of 5 days per week for 30 minutes of MODERATE intensity ( target 150-300 minutes  PLUS 2 days per week of FULL BODY resistance training. Progression will be addressed at follow up visits. Encouraged contemplation regarding establishing physical activity routine    Sleep  Provided sleep hygiene counseling and importance of having adequate sleep duration          Lennie was seen today for follow-up.    Diagnoses and all orders for this visit:    Obesity, Class I, BMI 30-34.9  -     CBC and differential; Future  -     Iron, TIBC and Ferritin Panel; Future  -     Vitamin B12; Future    Fatigue  -     CBC and differential; Future  -     Iron, TIBC and Ferritin Panel; Future  -     Vitamin B12; Future                Total time spent reviewing chart, interviewing patient, examining patient, discussing plan, answering all questions, and documentin minutes with >50% face-to-face time with the patient.            Weight trajectory     Wt Readings from Last 20 Encounters:   24 89 kg (196 lb 3.2 oz)   24 88.1 kg (194 lb 3.2 oz)   24 89.3 kg (196 lb 14.4 oz)   24 89.9 kg (198 lb 4.8 oz)   24 89.9 kg (198 lb 4.8 oz)   24 90.1 kg (198 lb 11.2 oz)   24 91.5 kg (201 lb 12.8 oz)   24 93.4 kg (206 lb)   24 92.7 kg (204 lb 6.4 oz)   24 93.4 kg (206 lb)   23 92.1 kg (203 lb)   10/19/23 92.1 kg (203 lb)   10/16/23 92.1 kg (203 lb)   10/05/23 92.1 kg (203 lb)   23 90.3 kg (199 lb)   23 90.3 kg (199 lb)   23 89.4 kg (197 lb)   23 92.5 kg (204 lb)   23 91.6 kg (202 lb)   23 91.6 kg (202 lb)           Weight/BMI at start of program  on 01/18/24 : 204 lbs  Current weight on 03/18/24 : 196 lbs  Difference: -9 lbs  Target weight : 150 lbs    Current BMI : 34.76 kg/m2; 30.0-34.9- Obesity Class I:     Interim History   Patient reports that she is happy with 10 pound weight loss by attending healthy core program.  She reports that although she has used my fitness pal previously that it helps to have the accountability and monitoring piece so she knows her calorie target and protein goals.  She reports getting helpful suggestions from the dietitian about using chocolate flavored protein supplements such as bars and shakes to help her chocolate craving   -Reports feeling fatigued and dizzy and feels like she cannot recover after workouts.  Also wondered if an over-the-counter supplement Res- Q was contributing   -Feels like she has to willfully keep herself out of the kitchen in the evenings due to cravings and reports that sometimes she resorts to emotional eating.  -Patient wondered if she has adrenal fatigue    Per diet recall from RD  Breakfast (9-10 am): 1/4 cup oatmeal w/ 2 eggs         Snack: -- Or 1/2 apple (previously was grazing on high sugar foods)  Lunch (1:30 pm): sandwich (tuna or deli meat or egg salad and has been adding vegetables) Or leftovers  Snack (5 pm): 1/2 scoop pure protein powder w/ 1 cup almond milk Or pure protein bar   Dinner (7-8 pm): protein/vegetable/carb Or steak/sausage/chicken/fish  Beverages: water, no caffeine (sensitive), alcohol (1x/week)   Volume of beverage intake: 16 oz water (working to increase)    Physical Activity -- joined gym doing cycling and circuit weight training,classes tired   Sleep -- STOP- BANG- 5/8    Occupation-retired  Psycho social- -- yes; cravings in evening  Antiobesity Medications/ Medical--- wanted to try lifestyle measures          Colonoscopy: UTD  Mammogram: UTD      Objective         The following portions of the patient's history were reviewed and updated as appropriate: allergies,  "current medications, past family history, past medical history, past social history, past surgical history, and problem list.      /78 (BP Location: Left arm, Patient Position: Sitting, Cuff Size: Large)   Pulse 70   Ht 5' 3\" (1.6 m)   Wt 89 kg (196 lb 3.2 oz)   LMP 01/01/2016   BMI 34.76 kg/m²         Review of Systems   Neurological:  Positive for dizziness.        Fatigue       Physical Exam  Vitals and nursing note reviewed.   Constitutional:       Appearance: Normal appearance.   HENT:      Head: Normocephalic.   Neck:      Thyroid: No thyroid mass, thyromegaly or thyroid tenderness.   Cardiovascular:      Rate and Rhythm: Normal rate and regular rhythm.      Pulses: Normal pulses.      Heart sounds: Normal heart sounds.   Pulmonary:      Effort: Pulmonary effort is normal.      Breath sounds: Normal breath sounds.   Abdominal:      General: Bowel sounds are normal.      Palpations: Abdomen is soft.   Musculoskeletal:      Cervical back: Normal range of motion and neck supple. No tenderness.      Right lower leg: No edema.      Left lower leg: No edema.   Skin:     General: Skin is warm and dry.   Neurological:      General: No focal deficit present.      Mental Status: She is alert and oriented to person, place, and time.   Psychiatric:         Mood and Affect: Mood normal.         Behavior: Behavior normal.         Thought Content: Thought content normal.         Judgment: Judgment normal.              Current medications       Current Outpatient Medications:     Co Q-10 50 MG, Take 50 mg by mouth daily after breakfast, Disp: , Rfl:     ergocalciferol (VITAMIN D2) 50,000 units, Take 1 capsule (50,000 Units total) by mouth once a week, Disp: 8 capsule, Rfl: 0    esomeprazole (NexIUM) 40 MG capsule, TAKE ONE CAPSULE BY MOUTH TWICE A DAY, Disp: 60 capsule, Rfl: 5    metoprolol succinate (TOPROL-XL) 25 mg 24 hr tablet, TAKE ONE TABLET BY MOUTH EVERY DAY, Disp: 90 tablet, Rfl: 1    Omega-3 Fatty Acids " (fish oil) 1,000 mg, Take 1,000 mg by mouth daily, Disp: , Rfl:     Red Yeast Rice Extract (RED YEAST RICE PO), Take by mouth 2 (two) times a day, Disp: , Rfl:     dicyclomine (BENTYL) 10 mg capsule, Take 1 capsule (10 mg total) by mouth 4 (four) times a day (before meals and at bedtime) (Patient not taking: Reported on 10/5/2023), Disp: 120 capsule, Rfl: 2    DULoxetine (CYMBALTA) 20 mg capsule, Take 1 capsule (20 mg total) by mouth daily (Patient not taking: Reported on 11/2/2023), Disp: 30 capsule, Rfl: 1    terbinafine (LamISIL) 250 mg tablet, Take 1 tablet (250 mg total) by mouth daily (Patient not taking: Reported on 3/18/2024), Disp: 84 tablet, Rfl: 0         Labs     Most recent labs reviewed   Lab Results   Component Value Date     10/28/2017    SODIUM 143 10/30/2023    K 4.3 10/30/2023     10/30/2023    CO2 25 10/30/2023    AGAP 5 12/02/2022    BUN 13 10/30/2023    CREATININE 0.80 10/30/2023    GLUC 89 10/30/2023    GLUF 90 07/11/2020    CALCIUM 9.0 12/02/2022    AST 21 02/05/2024    ALT 26 02/05/2024    ALKPHOS 82 02/05/2024    PROT 6.8 10/28/2017    TP 6.9 02/05/2024    BILITOT 0.8 10/28/2017    TBILI 0.55 02/05/2024    EGFR 83 10/30/2023     Lab Results   Component Value Date    HGBA1C 5.5 02/05/2024     Lab Results   Component Value Date    DHE5OXOWMMVO 1.304 07/05/2020    TSH 1.460 10/30/2023     Lab Results   Component Value Date    CHOLESTEROL 232 (H) 02/05/2024     Lab Results   Component Value Date    HDL 53 02/05/2024     Lab Results   Component Value Date    TRIG 198 (H) 02/05/2024     Lab Results   Component Value Date    LDLCALC 139 (H) 02/05/2024

## 2024-03-19 ENCOUNTER — CLINICAL SUPPORT (OUTPATIENT)
Dept: BARIATRICS | Facility: CLINIC | Age: 64
End: 2024-03-19

## 2024-03-19 VITALS — WEIGHT: 194.8 LBS | BODY MASS INDEX: 34.52 KG/M2 | HEIGHT: 63 IN

## 2024-03-19 DIAGNOSIS — R63.5 ABNORMAL WEIGHT GAIN: Primary | ICD-10-CM

## 2024-03-19 PROCEDURE — RECHECK

## 2024-03-23 DIAGNOSIS — R53.83 OTHER FATIGUE: Primary | ICD-10-CM

## 2024-03-23 DIAGNOSIS — E66.09 CLASS 1 OBESITY DUE TO EXCESS CALORIES WITH BODY MASS INDEX (BMI) OF 34.0 TO 34.9 IN ADULT, UNSPECIFIED WHETHER SERIOUS COMORBIDITY PRESENT: ICD-10-CM

## 2024-03-25 NOTE — PROGRESS NOTES
"Weight Management Medical Nutrition Assessment  Lennie is here today for Healthy Core Month 2 f/u. Current Weight: 193.1#. Patient has lost 5.2# x 1 month and overall has lost 12.9#. Patient continues with food logging daily. Has shifted to less whole foods and more supplemental foods which she feels has reduced her satisfaction with meals. (Protein shake w/ chips vs protein/vegetable/carb style meal). Reviewed how to add a recipe to MyFitnessPal. Has increased exercise significantly. Reports feeling lightheaded after exercise. Suggested increased fluid intake, especially with exercise. Currently having 16-24 oz/day with 2 glasses of herbal tea. Will continue with 2 glasses of herbal tea and increase free water to 48 oz/day. She has been having some back pain with current exercise routine. Encouraged she continue chair yoga and strength training. She will add walking to reduce intensity of spin class but continue exercise routine. Patient now with class I obesity. Patient congratulated. 189# is new weight loss goal. Keep up the good work!    Patient seen by Medical Provider in past 6 months:  yes  Requested to schedule appointment with Medical Provider: No    Anthropometric Measurements  Start Weight (#): 206# (24)  Current Weight (#): 193.1#  TBW % Change from start weight: 6.3%  Ideal Body Weight (#): 115# (63\")  Goal Weight (#): ST-10% LT#    Weight Loss History  Previous weight loss attempts: Commercial Programs (Weight Watchers, [employer], etc.)  Exercise, Self Created Diets (Portion Control, Healthy Food Choices, etc.)    Food and Nutrition Related History  Wake up: 8 am    Bed Time: --  Sleep quality: averages 8 hours, struggles with reflux     Dietary Recall  Breakfast (9-10 am): 1/4 cup oatmeal w/ 2 eggs    Snack: -- Or 1/2 apple   Lunch (1:30 pm): sandwich (tuna or deli meat or egg salad and has been adding vegetables) Or leftovers Or bistro salad Or protein shake w/ chips   Snack (5 pm): 1/2 " scoop pure protein powder w/ 1 cup almond milk Or pure protein bar   Dinner (7-8 pm): protein/vegetable/carb Or steak/sausage/chicken/fish  Snack: pretzel thins Or chips (having 1 serving + a little extra)       Beverages: water, no caffeine (sensitive), alcohol (1x/week)   Volume of beverage intake: 24 oz water (working to increase)     Weekends: Same (retired)   Cravings: carbs/sweets   Trouble area of day: evenings and grazing throughout the day     Frequency of Eating out: 1x/week (not often)   Food restrictions: avoids fluid milk (lactose intolerance but okay with yogurt + cheese)   Cooking: self  Food Shopping: self    Occupation: retired      Physical Activity  Activity: [employer] program (chair yoga 1x/week, spin class 2x/week, strength training 2x/week)  Frequency: 4-5x/week  Physical limitations/barriers to exercise: herniated/bulging disc in neck/back     Estimated Needs  Energy  SECA: BMR: 1,577 X 1.4 -1,000 = 1,208 kcal     [facility] Energy Needs (needs at 193.1#)  BMR: 1,400 kcal  Maintenance calories (sedentary): 1,680 kcal  1-2#/week loss (sedentary): 680-1,180 kcal  1-2#/week loss (light activity): 925-1,425 kcal    Protein: 63-78 grams (1.2-1.5g/kg IBW)  Fluid: 61 ounces (35mL/kg IBW)    Nutrition Diagnosis  Yes;    Overweight/obesity related to Excess energy intake as evidenced by BMI more than normative standard for age and sex (obesity-grade I 30-34.9)     Nutrition Intervention    Nutrition Prescription  Calories: 1,100-1,300 kcal  Protein: 63-78 g  Fluid: 61+ ounces    Meal Plan (Dami/Pro)  Breakfast: 200-250/15-20  Snack: 100/5+  Lunch: 300/25  Snack: 100-150/5+  Dinner: 350-400/30  Snack: 100-150/5+    Nutrition Education  Healthy Core Manual   Calorie controlled menu  Lean protein food choices  Healthy snack options  Food journaling tips    Nutrition Counseling  Strategies: meal planning, portion sizes, healthy snack choices, hydration, fiber intake, protein intake, exercise,  food logging    Monitoring and Evaluation:    Evaluation criteria  Energy Intake  Meet protein needs  Maintain adequate hydration  Monitor weekly weight  Meal planning/preparation  Food journal   Decreased portions at mealtimes and snacks  Physical activity     Barriers to change:cognitive  Readiness to change: Action:  (Changing behavior)  Comprehension: good  Expected Compliance: good

## 2024-03-26 ENCOUNTER — CLINICAL SUPPORT (OUTPATIENT)
Dept: BARIATRICS | Facility: CLINIC | Age: 64
End: 2024-03-26

## 2024-03-26 VITALS — BODY MASS INDEX: 34.21 KG/M2 | HEIGHT: 63 IN | WEIGHT: 193.1 LBS

## 2024-03-26 VITALS — WEIGHT: 193.1 LBS | BODY MASS INDEX: 34.21 KG/M2 | HEIGHT: 63 IN

## 2024-03-26 DIAGNOSIS — R63.5 ABNORMAL WEIGHT GAIN: Primary | ICD-10-CM

## 2024-03-26 DIAGNOSIS — Z00.6 ENCOUNTER FOR EXAMINATION FOR NORMAL COMPARISON OR CONTROL IN CLINICAL RESEARCH PROGRAM: ICD-10-CM

## 2024-03-26 PROCEDURE — RECHECK

## 2024-03-29 LAB
BASOPHILS # BLD AUTO: 0 X10E3/UL (ref 0–0.2)
BASOPHILS NFR BLD AUTO: 1 %
EOSINOPHIL # BLD AUTO: 0.1 X10E3/UL (ref 0–0.4)
EOSINOPHIL NFR BLD AUTO: 2 %
ERYTHROCYTE [DISTWIDTH] IN BLOOD BY AUTOMATED COUNT: 13.1 % (ref 11.7–15.4)
FERRITIN SERPL-MCNC: 147 NG/ML (ref 15–150)
HCT VFR BLD AUTO: 42.8 % (ref 34–46.6)
HGB BLD-MCNC: 14.2 G/DL (ref 11.1–15.9)
IMM GRANULOCYTES # BLD: 0 X10E3/UL (ref 0–0.1)
IMM GRANULOCYTES NFR BLD: 0 %
IRON SATN MFR SERPL: 35 % (ref 15–55)
IRON SERPL-MCNC: 119 UG/DL (ref 27–139)
LYMPHOCYTES # BLD AUTO: 1.4 X10E3/UL (ref 0.7–3.1)
LYMPHOCYTES NFR BLD AUTO: 35 %
MCH RBC QN AUTO: 29.2 PG (ref 26.6–33)
MCHC RBC AUTO-ENTMCNC: 33.2 G/DL (ref 31.5–35.7)
MCV RBC AUTO: 88 FL (ref 79–97)
MONOCYTES # BLD AUTO: 0.3 X10E3/UL (ref 0.1–0.9)
MONOCYTES NFR BLD AUTO: 8 %
NEUTROPHILS # BLD AUTO: 2.1 X10E3/UL (ref 1.4–7)
NEUTROPHILS NFR BLD AUTO: 54 %
PLATELET # BLD AUTO: 142 X10E3/UL (ref 150–450)
RBC # BLD AUTO: 4.87 X10E6/UL (ref 3.77–5.28)
TIBC SERPL-MCNC: 340 UG/DL (ref 250–450)
UIBC SERPL-MCNC: 221 UG/DL (ref 118–369)
VIT B12 SERPL-MCNC: 272 PG/ML (ref 232–1245)
WBC # BLD AUTO: 3.8 X10E3/UL (ref 3.4–10.8)

## 2024-04-02 ENCOUNTER — APPOINTMENT (OUTPATIENT)
Dept: LAB | Facility: CLINIC | Age: 64
End: 2024-04-02

## 2024-04-02 ENCOUNTER — CLINICAL SUPPORT (OUTPATIENT)
Dept: BARIATRICS | Facility: CLINIC | Age: 64
End: 2024-04-02

## 2024-04-02 VITALS — BODY MASS INDEX: 34 KG/M2 | HEIGHT: 63 IN | WEIGHT: 191.9 LBS

## 2024-04-02 DIAGNOSIS — R63.5 ABNORMAL WEIGHT GAIN: Primary | ICD-10-CM

## 2024-04-02 DIAGNOSIS — Z00.6 ENCOUNTER FOR EXAMINATION FOR NORMAL COMPARISON OR CONTROL IN CLINICAL RESEARCH PROGRAM: ICD-10-CM

## 2024-04-02 PROCEDURE — 36415 COLL VENOUS BLD VENIPUNCTURE: CPT

## 2024-04-02 PROCEDURE — RECHECK

## 2024-04-08 ENCOUNTER — TELEPHONE (OUTPATIENT)
Age: 64
End: 2024-04-08

## 2024-04-08 ENCOUNTER — NURSE TRIAGE (OUTPATIENT)
Age: 64
End: 2024-04-08

## 2024-04-08 NOTE — TELEPHONE ENCOUNTER
Patient called regarding recent blood results platelet count,patient stated she was advised by weight management to contact pcp regarding the results.Patient has a scheduled appt on 4/22.

## 2024-04-08 NOTE — TELEPHONE ENCOUNTER
Please advise patient to report back heart rate and blood pressure readings and a current weight if she has symptoms of dizziness.  Patient is on metoprolol and would likely need dose adjusted if her blood pressure is lower after some weight loss.  Low vitamin D should not cause dizziness.

## 2024-04-08 NOTE — TELEPHONE ENCOUNTER
"Pt called in asking about repeating Vitamin D levels. Pt reports tomorrow will start first week after taking Vitamin D2 for 8-weeks. Pt endorses continued muscle fatigue & intermittent dizziness. Pt unsure if she should continue taking Vitamin D2 and/or have levels rechecked.    Pt will be in classroom tomorrow for weigh-in.    Reason for Disposition   Nursing judgment    Answer Assessment - Initial Assessment Questions  1. REASON FOR CALL or QUESTION: \"What is your reason for calling today?\" or \"How can I best help you?\" or \"What question do you have that I can help answer?\"      Asking about repeating Vitamin D levels.    Protocols used: Information Only Call - No Triage-ADULT-[place]    "

## 2024-04-09 NOTE — TELEPHONE ENCOUNTER
Spoke with patient and informed her of the above info. She is questioning if she should have her vitamin D levels rechecked since she is having some muscle fatigue. She stated that her sister is a doctor and told her that she should have been on the 50,000IU x 12 weeks instead of 8. She would like to know if you would order another vitamin D blood test and inform her of what daily dose you want her to use.

## 2024-04-11 DIAGNOSIS — E55.9 VITAMIN D DEFICIENCY: Primary | ICD-10-CM

## 2024-04-12 ENCOUNTER — NURSE TRIAGE (OUTPATIENT)
Age: 64
End: 2024-04-12

## 2024-04-12 ENCOUNTER — OFFICE VISIT (OUTPATIENT)
Dept: FAMILY MEDICINE CLINIC | Facility: CLINIC | Age: 64
End: 2024-04-12

## 2024-04-12 VITALS
TEMPERATURE: 98.2 F | WEIGHT: 192 LBS | SYSTOLIC BLOOD PRESSURE: 118 MMHG | HEIGHT: 63 IN | HEART RATE: 74 BPM | BODY MASS INDEX: 34.02 KG/M2 | RESPIRATION RATE: 16 BRPM | OXYGEN SATURATION: 98 % | DIASTOLIC BLOOD PRESSURE: 64 MMHG

## 2024-04-12 DIAGNOSIS — Z86.010 HX OF ADENOMATOUS COLONIC POLYPS: ICD-10-CM

## 2024-04-12 DIAGNOSIS — R19.7 DIARRHEA OF PRESUMED INFECTIOUS ORIGIN: Primary | ICD-10-CM

## 2024-04-12 DIAGNOSIS — K59.01 SLOW TRANSIT CONSTIPATION: ICD-10-CM

## 2024-04-12 DIAGNOSIS — K22.70 BARRETT'S ESOPHAGUS WITHOUT DYSPLASIA: ICD-10-CM

## 2024-04-12 PROBLEM — K90.9 DIARRHEA DUE TO MALABSORPTION: Status: ACTIVE | Noted: 2018-07-11

## 2024-04-12 RX ORDER — ONDANSETRON 4 MG/1
4 TABLET, FILM COATED ORAL EVERY 8 HOURS PRN
Qty: 40 TABLET | Refills: 0 | Status: SHIPPED | OUTPATIENT
Start: 2024-04-12

## 2024-04-12 RX ORDER — DICYCLOMINE HYDROCHLORIDE 10 MG/1
10 CAPSULE ORAL
Qty: 120 CAPSULE | Refills: 1 | Status: SHIPPED | OUTPATIENT
Start: 2024-04-12

## 2024-04-12 NOTE — ASSESSMENT & PLAN NOTE
- patient having episode of diarrhea for 5 days.  No recent travel or recent antibiotics.  Terbinafine can cause diarrhea and gastrointestinal issues however she has stopped taking that on Sunday when she started with diarrhea    -I do not believe that I need to perform stool studies on the patient.  She does not appear acutely ill and has a benign abdominal examination    -Zofran provided as needed for nausea and given a prescription for Bentyl to take on a as needed basis for abdominal cramping and loose stools
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
## 2024-04-12 NOTE — ASSESSMENT & PLAN NOTE
Patient remains on Nexium 40 mg twice daily.  She is not due for repeat EGD with gastroenterologist

## 2024-04-12 NOTE — TELEPHONE ENCOUNTER
"Pt states she had diarrhea since Monday and it has not improved.  Pt states she had taken Pepto and Imodium alternating and the Imodium stopped her diarrhea but caused her to bloat.  The pepto made her upset stomach feel a little better but still had diarrhea.  Pt scheduled to see PCP today 4/12/24 at 1120.    Reason for Disposition   SEVERE diarrhea (e.g., 7 or more times / day more than normal) and present > 24 hours (1 day)    Answer Assessment - Initial Assessment Questions  1. DIARRHEA SEVERITY: \"How bad is the diarrhea?\" \"How many extra stools have you had in the past 24 hours than normal?\"     - NO DIARRHEA (SCALE 0)    - MILD (SCALE 1-3): Few loose or mushy BMs; increase of 1-3 stools over normal daily number of stools; mild increase in ostomy output.    -  MODERATE (SCALE 4-7): Increase of 4-6 stools daily over normal; moderate increase in ostomy output.  * SEVERE (SCALE 8-10; OR 'WORST POSSIBLE'): Increase of 7 or more stools daily over normal; moderate increase in ostomy output; incontinence.      10/10    2. ONSET: \"When did the diarrhea begin?\"       Pt feels its   3. BM CONSISTENCY: \"How loose or watery is the diarrhea?\"       Watery and bloating.    4. VOMITING: \"Are you also vomiting?\" If Yes, ask: \"How many times in the past 24 hours?\"       Denies    5. ABDOMINAL PAIN: \"Are you having any abdominal pain?\" If Yes, ask: \"What does it feel like?\" (e.g., crampy, dull, intermittent, constant)       \"Gurgling\" feeling    6. ABDOMINAL PAIN SEVERITY: If present, ask: \"How bad is the pain?\"  (e.g., Scale 1-10; mild, moderate, or severe)    - MILD (1-3): doesn't interfere with normal activities, abdomen soft and not tender to touch     - MODERATE (4-7): interferes with normal activities or awakens from sleep, tender to touch     - SEVERE (8-10): excruciating pain, doubled over, unable to do any normal activities        Discomfort, Moderate    7. ORAL INTAKE: If vomiting, \"Have you been able to drink " "liquids?\" \"How much fluids have you had in the past 24 hours?\"      Pt is able to drink fluids.    8. HYDRATION: \"Any signs of dehydration?\" (e.g., dry mouth [not just dry lips], too weak to stand, dizziness, new weight loss) \"When did you last urinate?\"      Pt denies.  However she states the more water she drinks the more she has BM.    9. EXPOSURE: \"Have you traveled to a foreign country recently?\" \"Have you been exposed to anyone with diarrhea?\" \"Could you have eaten any food that was spoiled?\"       States daughter had diarrhea on 4/2-4/3.  Pt states her diarrhea started on Monday 4/8/24    10. ANTIBIOTIC USE: \"Are you taking antibiotics now or have you taken antibiotics in the past 2 months?\"        Pt is on a Terbinafine, antifungal    11. OTHER SYMPTOMS: \"Do you have any other symptoms?\" (e.g., fever, blood in stool)        Denies    Protocols used: Diarrhea-ADULT-[place]    "

## 2024-04-16 ENCOUNTER — CLINICAL SUPPORT (OUTPATIENT)
Dept: BARIATRICS | Facility: CLINIC | Age: 64
End: 2024-04-16

## 2024-04-16 VITALS — WEIGHT: 190.8 LBS | BODY MASS INDEX: 33.81 KG/M2 | HEIGHT: 63 IN

## 2024-04-16 DIAGNOSIS — R63.5 ABNORMAL WEIGHT GAIN: Primary | ICD-10-CM

## 2024-04-16 LAB — 25(OH)D3+25(OH)D2 SERPL-MCNC: 37.5 NG/ML (ref 30–100)

## 2024-04-16 PROCEDURE — RECHECK

## 2024-04-19 RX ORDER — IVERMECTIN 10 MG/G
CREAM TOPICAL
COMMUNITY
Start: 2024-04-04

## 2024-04-21 LAB
APOB+LDLR+PCSK9 GENE MUT ANL BLD/T: NOT DETECTED
BRCA1+BRCA2 DEL+DUP + FULL MUT ANL BLD/T: NOT DETECTED
MLH1+MSH2+MSH6+PMS2 GN DEL+DUP+FUL M: NOT DETECTED

## 2024-04-22 ENCOUNTER — OFFICE VISIT (OUTPATIENT)
Dept: FAMILY MEDICINE CLINIC | Facility: CLINIC | Age: 64
End: 2024-04-22
Payer: COMMERCIAL

## 2024-04-22 VITALS
DIASTOLIC BLOOD PRESSURE: 62 MMHG | BODY MASS INDEX: 34.2 KG/M2 | WEIGHT: 193 LBS | RESPIRATION RATE: 16 BRPM | HEART RATE: 55 BPM | HEIGHT: 63 IN | OXYGEN SATURATION: 96 % | SYSTOLIC BLOOD PRESSURE: 138 MMHG

## 2024-04-22 DIAGNOSIS — E55.9 VITAMIN D DEFICIENCY: ICD-10-CM

## 2024-04-22 DIAGNOSIS — I10 PRIMARY HYPERTENSION: Primary | ICD-10-CM

## 2024-04-22 DIAGNOSIS — J30.89 ALLERGIC RHINITIS DUE TO HOUSE DUST MITE: ICD-10-CM

## 2024-04-22 DIAGNOSIS — G60.9 IDIOPATHIC PERIPHERAL NEUROPATHY: ICD-10-CM

## 2024-04-22 DIAGNOSIS — E78.2 MIXED HYPERLIPIDEMIA: ICD-10-CM

## 2024-04-22 PROBLEM — S80.02XA CONTUSION OF LEFT KNEE: Status: RESOLVED | Noted: 2022-12-08 | Resolved: 2024-04-22

## 2024-04-22 PROCEDURE — 99214 OFFICE O/P EST MOD 30 MIN: CPT | Performed by: FAMILY MEDICINE

## 2024-04-22 RX ORDER — DULOXETIN HYDROCHLORIDE 20 MG/1
20 CAPSULE, DELAYED RELEASE ORAL DAILY
Qty: 30 CAPSULE | Refills: 1 | Status: SHIPPED | OUTPATIENT
Start: 2024-04-22
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
## 2024-04-22 NOTE — ASSESSMENT & PLAN NOTE
Once again recommended that patient try doing something for this.  Likely related to DJD of the lumbar spine.  Prescription once again provided for duloxetine 20 mg once daily.  Advised patient to try taking medication for the next 10 to 14 days to see if it gives her more energy and a little relief from her peripheral neuropathy

## 2024-04-22 NOTE — ASSESSMENT & PLAN NOTE
Dust mite allergy.  She was given Singulair but reportedly that was too strong for her.  Given samples of over-the-counter Zantac for her to try

## 2024-04-22 NOTE — ASSESSMENT & PLAN NOTE
Remains very well-controlled on Toprol-XL 25 mg once daily.  I would not change dosage of beta-blocker.  This is suppressing any palpitations that she was experiencing

## 2024-04-23 ENCOUNTER — CLINICAL SUPPORT (OUTPATIENT)
Dept: BARIATRICS | Facility: CLINIC | Age: 64
End: 2024-04-23

## 2024-04-23 VITALS — HEIGHT: 63 IN | BODY MASS INDEX: 33.77 KG/M2 | WEIGHT: 190.6 LBS

## 2024-04-23 DIAGNOSIS — R63.5 ABNORMAL WEIGHT GAIN: Primary | ICD-10-CM

## 2024-04-23 PROCEDURE — RECHECK

## 2024-04-30 ENCOUNTER — CLINICAL SUPPORT (OUTPATIENT)
Dept: BARIATRICS | Facility: CLINIC | Age: 64
End: 2024-04-30

## 2024-04-30 VITALS — HEIGHT: 63 IN | WEIGHT: 191.3 LBS | BODY MASS INDEX: 33.89 KG/M2

## 2024-04-30 DIAGNOSIS — R63.5 ABNORMAL WEIGHT GAIN: Primary | ICD-10-CM

## 2024-04-30 PROCEDURE — RECHECK

## 2024-05-07 ENCOUNTER — CLINICAL SUPPORT (OUTPATIENT)
Dept: BARIATRICS | Facility: CLINIC | Age: 64
End: 2024-05-07

## 2024-05-07 VITALS — BODY MASS INDEX: 34.05 KG/M2 | WEIGHT: 192.2 LBS | HEIGHT: 63 IN

## 2024-05-07 DIAGNOSIS — R63.5 ABNORMAL WEIGHT GAIN: Primary | ICD-10-CM

## 2024-05-07 PROCEDURE — RECHECK

## 2024-05-09 NOTE — PROGRESS NOTES
"Weight Management Medical Nutrition Assessment  Lennie is here today for Healthy Core Month 3 REE/SECA. Current Weight: 191.7#. Patient has lost 1.4# x 7 weeks and overall has lost 14.3#. Patient reports some added stressors which has contributed to some emotional eating. Identified certain trigger foods over the last few weeks and is working to keep them out of the house. Continues with logging behavior. Has reduced activity as she was previously experiencing lightheadedness and some minor injuries. Continues with strength training 2x/week along with chair yoga 1x/week. Completed a body composition using SECA scale and reviewed results with patient. Reevue indirect calorimeter revealed REE is WNL (+1%) compared to the predictive normal for someone of same age, height, and gender. Recommend calorie range of 1,200-1,500 kcal/day depending on activity level based on indirect calorimeter reading. Options for follow up reviewed with patient. Will decide and schedule accordingly. Keep up the good work!    Patient seen by Medical Provider in past 6 months:  yes  Requested to schedule appointment with Medical Provider: No    Anthropometric Measurements  Start Weight (#): 206# (24)  Current Weight (#): 191.7#  TBW % Change from start weight: 7%  Ideal Body Weight (#): 115# (63\")  Goal Weight (#): ST-10% LT#    Weight Loss History  Previous weight loss attempts: Commercial Programs (Weight Watchers, Med ePad, etc.)  Exercise, Self Created Diets (Portion Control, Healthy Food Choices, etc.)    Food and Nutrition Related History  Wake up: 8 am    Bed Time: --  Sleep quality: averages 8 hours, struggles with reflux     Dietary Recall  Breakfast (9-10 am): 1/4 cup oatmeal w/ 2 eggs  Snack: -- Or 1/2 apple Or protein bar  Lunch (1:30 pm): sandwich (tuna or deli meat or egg salad and has been adding vegetables) Or leftovers Or bistro salad  Snack (5 pm): 1/2 scoop pure protein powder w/ 1 cup almond milk Or pure " protein bar  Dinner (7-8 pm): protein/vegetable/carb Or steak/sausage/chicken/fish  Snack: pretzel thins Or chips (having 1 serving + a little extra)     Beverages: water, no caffeine (sensitive), alcohol (1x/week)  Volume of beverage intake: 32-48 oz water    Weekends: Same (retired)   Cravings: carbs/sweets   Trouble area of day: evenings and grazing throughout the day     Frequency of Eating out: 1x/week (not often)   Food restrictions: avoids fluid milk (lactose intolerance but okay with yogurt + cheese)   Cooking: self  Food Shopping: self    Occupation: retired      Physical Activity  Activity: THRIVE program (chair yoga 1x/week, strength training 2x/week)  Frequency: 2x/week  Physical limitations/barriers to exercise: herniated/bulging disc in neck/back     Estimated Needs  Energy  SECA: BMR: 1,577 X 1.4 -1,000 = 1,208 kcal     [facility] Energy Needs (needs at 193.1#)  BMR: 1,400 kcal  Maintenance calories (sedentary): 1,680 kcal  1-2#/week loss (sedentary): 680-1,180 kcal  1-2#/week loss (light activity): 925-1,425 kcal    Protein: 63-78 grams (1.2-1.5g/kg IBW)  Fluid: 61 ounces (35mL/kg IBW)    Nutrition Diagnosis  Yes;    Overweight/obesity related to Excess energy intake as evidenced by BMI more than normative standard for age and sex (obesity-grade I 30-34.9)     Nutrition Intervention    Nutrition Prescription  Calories: 1,200-1,400 kcal  Protein: 63-78 g  Fluid: 61+ ounces    Meal Plan (Dami/Pro)  Breakfast: 200-250/15-20  Snack: 100/5+  Lunch: 300/25  Snack: 100-150/5+  Dinner: 350-400/30  Snack: 100-150/5+    Nutrition Education  Healthy Core Manual   Calorie controlled menu  Lean protein food choices  Healthy snack options  Food journaling tips    Nutrition Counseling  Strategies: meal planning, portion sizes, healthy snack choices, hydration, fiber intake, protein intake, exercise, food logging    Monitoring and Evaluation:    Evaluation criteria  Energy Intake  Meet protein  needs  Maintain adequate hydration  Monitor weekly weight  Meal planning/preparation  Food journal   Decreased portions at mealtimes and snacks  Physical activity     Barriers to change:none  Readiness to change: Action:  (Changing behavior)  Comprehension: good  Expected Compliance: good

## 2024-05-13 ENCOUNTER — CLINICAL SUPPORT (OUTPATIENT)
Dept: BARIATRICS | Facility: CLINIC | Age: 64
End: 2024-05-13

## 2024-05-13 VITALS — BODY MASS INDEX: 33.96 KG/M2 | WEIGHT: 191.7 LBS

## 2024-05-13 VITALS — WEIGHT: 191.7 LBS | HEIGHT: 63 IN | BODY MASS INDEX: 33.96 KG/M2

## 2024-05-13 DIAGNOSIS — R63.5 ABNORMAL WEIGHT GAIN: Primary | ICD-10-CM

## 2024-05-13 PROCEDURE — RECHECK

## 2024-05-13 NOTE — PROGRESS NOTES
Patient presents for 30 minute Behavioral Health Evaluation as a part of Medical Weight Management Program, current weight 191.7.    Patient reported that she has reflected on guidance given during last visit, has done some journaling and realized some of the triggers of her eating, mostly related to anxiety. She recognizes racing thoughts about her self worth and sense of self that can exacerbate her anxiety and lead to emotional eating to cope. She wants to gain greater insight on how to manage this but doesn't feel she knows fully how to do so. Support and encouragement offered that she is already coping with the journaling and reflection she is doing. Discussed the benefits of slowing down internal thoughts when feeling overwhelmed or stressed, engaging in positive reframes, challenging distorted thinking and using journaling to bring subconscious thoughts to the conscious. Encouraged patient to call on her supports, to define for them the kind of support she needs and to find non-food coping skills to manage. She has been considering going back to Al-Anon meetings which have been helpful in the past, she does still have the resource list provided at last visit to call for therapy. Patient aware she can come to this SW for continued support with weight management behaviors if interested.    Next Appointment:  with Dr. Frazier on 5/16

## 2024-05-15 ENCOUNTER — TELEPHONE (OUTPATIENT)
Age: 64
End: 2024-05-15

## 2024-05-15 NOTE — TELEPHONE ENCOUNTER
Pt called stating that she could not find the labs via [facility] that were ordered by Dr. Danielle to have completed by 5/5. Advised that labs were ordered on 2/5/24 so she may have to scroll down via [facility]. Pt states she goes to [facility] and would like to  lab slips.    Please print Lipid panel with Jamie LDL relfex and Hepatic function Panel ordred 2/5 for pt to  in office.    Thank you!

## 2024-05-17 ENCOUNTER — TELEPHONE (OUTPATIENT)
Age: 64
End: 2024-05-17

## 2024-05-17 NOTE — TELEPHONE ENCOUNTER
Caller: Lennie Moore    Doctor and/or Office: Dr. Erickson/[facility]#: [phone]    Escalation: Medication Patient started taking the terbinafine again and it is making her very sick again. She stopped last night. What should she do from here? Requesting a return call. Thank you

## 2024-05-20 ENCOUNTER — TELEPHONE (OUTPATIENT)
Age: 64
End: 2024-05-20

## 2024-05-20 NOTE — TELEPHONE ENCOUNTER
Spoke with pt regarding her concerns with her reaction to the medication she was taking. I let her know that there is nothing else he can prescribe other than a topical medication. She stated that her main concern is trying to get the medication out of her system. I advised her that there isn't much she can do other than waiting. I also let her know that I will ask Dr. Erickson and if he has any other recommendations that I would call her back to let her know. She was understanding and thankful.

## 2024-06-12 ENCOUNTER — TELEPHONE (OUTPATIENT)
Dept: CARDIOLOGY CLINIC | Facility: CLINIC | Age: 64
End: 2024-06-12

## 2024-06-12 DIAGNOSIS — E78.2 MIXED HYPERLIPIDEMIA: Primary | ICD-10-CM

## 2024-06-12 LAB
ALBUMIN SERPL-MCNC: 4.6 G/DL (ref 3.9–4.9)
ALP SERPL-CCNC: 107 IU/L (ref 44–121)
ALT SERPL-CCNC: 13 IU/L (ref 0–32)
AST SERPL-CCNC: 19 IU/L (ref 0–40)
BILIRUB DIRECT SERPL-MCNC: 0.12 MG/DL (ref 0–0.4)
BILIRUB SERPL-MCNC: 0.7 MG/DL (ref 0–1.2)
CHOLEST SERPL-MCNC: 262 MG/DL (ref 100–199)
HDLC SERPL-MCNC: 54 MG/DL
LDLC SERPL CALC-MCNC: 163 MG/DL (ref 0–99)
LDLC/HDLC SERPL: 3 RATIO (ref 0–3.2)
PROT SERPL-MCNC: 6.9 G/DL (ref 6–8.5)
SL AMB VLDL CHOLESTEROL CALC: 45 MG/DL (ref 5–40)
TRIGL SERPL-MCNC: 244 MG/DL (ref 0–149)

## 2024-06-12 RX ORDER — CHLORAL HYDRATE 500 MG
2000 CAPSULE ORAL 2 TIMES DAILY
Qty: 180 CAPSULE | Refills: 4 | Status: SHIPPED | OUTPATIENT
Start: 2024-06-12

## 2024-06-12 NOTE — TELEPHONE ENCOUNTER
----- Message from Wanda Danielle MD sent at 6/12/2024  9:14 AM EDT -----  Please call and inform patient that the blood test showed that her LDL and triglycerides have further increased.  Patient needs to concentrate more on her diet cutting out fat and carbs and increase level of cardiovascular exercise.  Will discuss about medical treatment again at next scheduled office visit

## 2024-06-13 ENCOUNTER — OFFICE VISIT (OUTPATIENT)
Dept: BARIATRICS | Facility: CLINIC | Age: 64
End: 2024-06-13
Payer: COMMERCIAL

## 2024-06-13 VITALS
BODY MASS INDEX: 33.88 KG/M2 | SYSTOLIC BLOOD PRESSURE: 114 MMHG | HEIGHT: 63 IN | WEIGHT: 191.2 LBS | DIASTOLIC BLOOD PRESSURE: 70 MMHG | HEART RATE: 73 BPM

## 2024-06-13 DIAGNOSIS — E66.9 OBESITY, CLASS I, BMI 30-34.9: Primary | ICD-10-CM

## 2024-06-13 PROBLEM — E66.811 OBESITY, CLASS I, BMI 30-34.9: Status: ACTIVE | Noted: 2024-06-13

## 2024-06-13 PROCEDURE — 99215 OFFICE O/P EST HI 40 MIN: CPT | Performed by: INTERNAL MEDICINE

## 2024-06-13 NOTE — TELEPHONE ENCOUNTER
Patient called back. She said she stopped taking the OTC fish oil when she had a CBC drawn and her platelets were on the lower end of normal. They were not out of range or flagged, but she was afraid the fish oil might lower the platelets further so she stopped it.    She said she  the script for the Omega 3 that was prescribed and is willing to start it again, but is still worried that it might affect her platelet count.

## 2024-06-13 NOTE — ASSESSMENT & PLAN NOTE
Nutrition: Patient is considering continuing the healthy [employer] program to help her continue to stay on track but is not sure about her decision yet.  Discussed with patient that she can continue to eat what tastes good due to her changes in taste and to track what she is currently eating and can adjust her calories and protein intake according to the nutrition prescription provided  -Patient had gone off track due to her injury and vacation and is ready to resume a program  Nutrition Prescription  Calories: 1,200-1,400 kcal  Protein: 63-78 g  Fluid: 61+ ounces       Physical Activity: Now that her injury is better from the gym to start walking with her friend; patient reports she may not be ready to get back to the gym yet    Sleep: -Encourage patient to schedule a sleep study as her STOP-BANG score was 5/8    Behavioral/Stress: Start tracking current intake using MFP, so adjustments can be made by the dietitian.    Antiobesity Medications/Medical --patient expressed interest in the injectable medicine just Zepbound  -Discussed supply concerns with these medications  -Patient is concerned about her elevated triglycerides and wonders if she can continue to work with the dietitian to help manage this  -With her change in taste she is worried about starting any oral or injectable antiobesity pharmacotherapy at this point.  -Discussed with her that she could come back in 3 months to reassess  - -Metoprolol itself has a weight promoting property, requested with patient to discuss with PCP about switching you to a different agent; metoprolol typically has to be weaned and cannot be stopped abruptly

## 2024-06-13 NOTE — PROGRESS NOTES
Program: Conservative Program    Assessment/Plan     Jacinta Moore  is a 63 y.o. female with  returns to follow up  for treatment of excess body weight and associated risk factors/co-morbidities.     Obesity, Class I, BMI 30-34.9  Nutrition: Patient is considering continuing the [employer] ways program to help her continue to stay on track but is not sure about her decision yet.  Discussed with patient that she can continue to eat what tastes good due to her changes in taste and to track what she is currently eating and can adjust her calories and protein intake according to the nutrition prescription provided  -Patient had gone off track due to her injury and vacation and is ready to resume a program    Physical Activity: Now that her injury is better from the gym to start walking with her friend; patient reports she may not be ready to get back to the gym yet    Sleep: -Encourage patient to schedule a sleep study as her STOP-BANG score was 5/8    Behavioral/Stress: Start tracking current intake using MFP, so adjustments can be made by the dietitian.    Antiobesity Medications/Medical --patient expressed interest in the injectable medicine just Zepbound  -Discussed supply concerns with these medications  -Patient is concerned about her elevated triglycerides and wonders if she can continue to work with the dietitian to help manage this  -With her change in taste she is worried about starting any oral or injectable antiobesity pharmacotherapy at this point.  -Discussed with her that she could come back in 3 months to reassess          Most recent notes and records were reviewed.         Return visit:  2-3 months     Nutrition   Do not skip meals. Avoid sugary beverages. At least 64oz of water daily.    Behavioral/Stress   Food log via nayana or provided paper log (nayana options include www.myfitnesspal.com, sparkpeople.com, Runnerit.com, calorieking.com, Purple). Encouraged mindful eating    Physical  "Activity  Increase physical activity by 10 minutes daily. Gradually increase physical activity to a goal of 5 days per week for 30 minutes of MODERATE intensity ( ( should be able to pass the \"talk test\" but should not be able to sing.  target 150-300 minutes  PLUS 2-3 days per week of FULL BODY resistance training. Progression will be addressed at follow up visits. Encouraged planning regarding establishing physical activity routine    Sleep  Provided sleep hygiene counseling and importance of having adequate sleep duration          Lennie was seen today for follow-up.    Diagnoses and all orders for this visit:    Obesity, Class I, BMI 30-34.9                Total time spent reviewing chart, interviewing patient, examining patient, discussing plan, answering all questions, and documentin minutes with >50% face-to-face time with the patient.            Weight trajectory     Wt Readings from Last 20 Encounters:   24 86.7 kg (191 lb 3.2 oz)   24 87 kg (191 lb 11.2 oz)   24 87 kg (191 lb 11.2 oz)   24 87.2 kg (192 lb 3.2 oz)   24 86.8 kg (191 lb 4.8 oz)   24 86.5 kg (190 lb 9.6 oz)   24 87.5 kg (193 lb)   24 86.5 kg (190 lb 12.8 oz)   24 87.1 kg (192 lb)   24 87 kg (191 lb 14.4 oz)   24 87.6 kg (193 lb 1.6 oz)   24 87.6 kg (193 lb 1.6 oz)   24 88.4 kg (194 lb 12.8 oz)   24 89 kg (196 lb 3.2 oz)   24 88.1 kg (194 lb 3.2 oz)   24 89.3 kg (196 lb 14.4 oz)   24 89.9 kg (198 lb 4.8 oz)   24 89.9 kg (198 lb 4.8 oz)   24 90.1 kg (198 lb 11.2 oz)   24 91.5 kg (201 lb 12.8 oz)               Weight/ Lifestyle history/HPI     Patient reports   -Patient patient is wrapping up with a healthy core program but expresses some frustration about not losing sufficient weight.  She reports that she had recent stressors about her  having a heart issues and having to go to an elevation.  She also reports injury " from spin classes at the gym.  She was prescribed terbinafine for toenail fungus and reports that since starting that medication she has extreme nausea and has lost her sense of taste.  She is concerned about her blood work because her triglycerides are going up.  She is very worried about starting any antiobesity medications while she is having these taste issues and has any additional nausea or other side effects from either oral or injectable medications could compound that.  Patient reports that she is trying to eat what tastes good now.  Has been similar to the diet recall below    Nutrition Prescription  Calories: 1,200-1,400 kcal  Protein: 63-78 g  Fluid: 61+ ounces     Dietary Recall  Breakfast (9-10 am): 1/4 cup oatmeal w/ 2 eggs  Snack: -- Or 1/2 apple Or protein bar  Lunch (1:30 pm): sandwich (tuna or deli meat or egg salad and has been adding vegetables) Or leftovers Or bistro salad  Snack (5 pm): 1/2 scoop pure protein powder w/ 1 cup almond milk Or pure protein bar  Dinner (7-8 pm): protein/vegetable/carb Or steak/sausage/chicken/fish  Snack: pretzel thins Or chips (having 1 serving + a little extra)     Beverages: water, no caffeine (sensitive), alcohol (1x/week)  Volume of beverage intake: 32-48 oz water     Weekends: Same (retired)   Cravings: carbs/sweets   Trouble area of day: evenings and grazing throughout the day      Frequency of Eating out: 1x/week (not often)     Smoking: no  Drug use: no    Physical Activity --stopped exercising wants to start walking with the friend and     Sleep -- STOP- BANG- 5/8; doesn't want to do the mask so did not schedule the sleep study    Occupation-retired     Psycho social-lives with  and son    Gyneac (Menopausal status/periods/contraception)-  Weight/BMI at start of program on 01/18/24 : 204 lbs   weight on 03/18/24 : 196 lbs(-9)  Difference: -9 lbs  Target weight : 150 lbs    Current Weight on 6/13/2024: 191(-5 lbs)  Current BMI : 34 kg/m2 30.0-34.9-  "Obesity Class I  Difference: -16 lbs      Anti obesity Medications/ Medical---    Weight loss medication and dose: none  Date initiated:               Objective         The following portions of the patient's history were reviewed and updated as appropriate: allergies, current medications, past family history, past medical history, past social history, past surgical history, and problem list.      /70 (BP Location: Left arm, Patient Position: Sitting, Cuff Size: Standard)   Pulse 73   Ht 5' 3\" (1.6 m)   Wt 86.7 kg (191 lb 3.2 oz)   LMP 01/01/2016   BMI 33.87 kg/m²             Review of Systems   Constitutional:  Negative for fatigue.   HENT:  Negative for sore throat.    Respiratory:  Negative for cough and shortness of breath.    Cardiovascular:  Negative for chest pain, palpitations and leg swelling.   Gastrointestinal:  Negative for abdominal pain, constipation, diarrhea and nausea.   Genitourinary:  Negative for dysuria.   Musculoskeletal:  Negative for arthralgias and back pain.   Skin:  Negative for rash.   Neurological:  Negative for headaches.   Psychiatric/Behavioral:  Negative for dysphoric mood. The patient is not nervous/anxious.          Physical Exam  Vitals and nursing note reviewed.   Constitutional:       Appearance: Normal appearance.   HENT:      Head: Normocephalic.   Pulmonary:      Effort: Pulmonary effort is normal.   Neurological:      General: No focal deficit present.      Mental Status: She is alert and oriented to person, place, and time.   Psychiatric:         Mood and Affect: Mood normal.         Behavior: Behavior normal.         Thought Content: Thought content normal.         Judgment: Judgment normal.          Current medications       Current Outpatient Medications:     Co Q-10 50 MG, Take 50 mg by mouth daily after breakfast, Disp: , Rfl:     esomeprazole (NexIUM) 40 MG capsule, TAKE ONE CAPSULE BY MOUTH TWICE A DAY, Disp: 60 capsule, Rfl: 5    Ivermectin 1 % CREA, " Apply TO FACE ONCE daily AT night, Disp: , Rfl:     metoprolol succinate (TOPROL-XL) 25 mg 24 hr tablet, TAKE ONE TABLET BY MOUTH EVERY DAY, Disp: 90 tablet, Rfl: 1    Omega-3 Fatty Acids (fish oil) 1,000 mg, Take 2 capsules (2,000 mg total) by mouth 2 (two) times a day, Disp: 180 capsule, Rfl: 4    Red Yeast Rice Extract (RED YEAST RICE PO), Take by mouth 2 (two) times a day, Disp: , Rfl:     dicyclomine (BENTYL) 10 mg capsule, Take 1 capsule (10 mg total) by mouth 4 (four) times a day (before meals and at bedtime) (Patient not taking: Reported on 6/13/2024), Disp: 120 capsule, Rfl: 1    DULoxetine (CYMBALTA) 20 mg capsule, Take 1 capsule (20 mg total) by mouth daily (Patient not taking: Reported on 6/13/2024), Disp: 30 capsule, Rfl: 1    ergocalciferol (VITAMIN D2) 50,000 units, Take 1 capsule (50,000 Units total) by mouth once a week (Patient not taking: Reported on 6/13/2024), Disp: 8 capsule, Rfl: 0    ondansetron (ZOFRAN) 4 mg tablet, Take 1 tablet (4 mg total) by mouth every 8 (eight) hours as needed for nausea (Patient not taking: Reported on 6/13/2024), Disp: 40 tablet, Rfl: 0         Labs     Most recent labs reviewed   Lab Results   Component Value Date     10/28/2017    SODIUM 143 10/30/2023    K 4.3 10/30/2023     10/30/2023    CO2 25 10/30/2023    AGAP 5 12/02/2022    BUN 13 10/30/2023    CREATININE 0.80 10/30/2023    GLUC 89 10/30/2023    GLUF 90 07/11/2020    CALCIUM 9.0 12/02/2022    AST 19 06/11/2024    ALT 13 06/11/2024    ALKPHOS 82 02/05/2024    PROT 6.8 10/28/2017    TP 6.9 06/11/2024    BILITOT 0.8 10/28/2017    TBILI 0.7 06/11/2024    EGFR 83 10/30/2023     Lab Results   Component Value Date    HGBA1C 5.5 02/05/2024     Lab Results   Component Value Date    YLV4KNLXCBAW 1.304 07/05/2020    TSH 1.460 10/30/2023     Lab Results   Component Value Date    CHOLESTEROL 262 (H) 06/11/2024     Lab Results   Component Value Date    HDL 54 06/11/2024     Lab Results   Component Value Date  "   TRIG 244 (H) 06/11/2024     Lab Results   Component Value Date    LDLCALC 163 (H) 06/11/2024     No results found for: \"VITD\"  No components found for: \"FASTINS\"   "

## 2024-07-20 DIAGNOSIS — I10 PRIMARY HYPERTENSION: ICD-10-CM

## 2024-07-20 RX ORDER — METOPROLOL SUCCINATE 25 MG/1
TABLET, EXTENDED RELEASE ORAL
Qty: 100 TABLET | Refills: 1 | Status: SHIPPED | OUTPATIENT
Start: 2024-07-20
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
## 2024-08-12 ENCOUNTER — OFFICE VISIT (OUTPATIENT)
Dept: CARDIOLOGY CLINIC | Facility: CLINIC | Age: 64
End: 2024-08-12
Payer: COMMERCIAL

## 2024-08-12 VITALS
OXYGEN SATURATION: 97 % | BODY MASS INDEX: 34.38 KG/M2 | HEART RATE: 76 BPM | DIASTOLIC BLOOD PRESSURE: 70 MMHG | HEIGHT: 63 IN | WEIGHT: 194 LBS | SYSTOLIC BLOOD PRESSURE: 110 MMHG

## 2024-08-12 DIAGNOSIS — E78.2 MIXED HYPERLIPIDEMIA: Primary | ICD-10-CM

## 2024-08-12 PROCEDURE — 99214 OFFICE O/P EST MOD 30 MIN: CPT | Performed by: INTERNAL MEDICINE

## 2024-08-12 PROCEDURE — 93000 ELECTROCARDIOGRAM COMPLETE: CPT | Performed by: INTERNAL MEDICINE

## 2024-08-12 RX ORDER — ROSUVASTATIN CALCIUM 5 MG/1
5 TABLET, COATED ORAL DAILY
Qty: 60 TABLET | Refills: 2 | Status: SHIPPED | OUTPATIENT
Start: 2024-08-12

## 2024-08-13 ENCOUNTER — TELEPHONE (OUTPATIENT)
Age: 64
End: 2024-08-13

## 2024-08-13 DIAGNOSIS — K21.9 GERD WITHOUT ESOPHAGITIS: ICD-10-CM

## 2024-08-13 RX ORDER — ESOMEPRAZOLE MAGNESIUM 40 MG/1
40 CAPSULE, DELAYED RELEASE ORAL DAILY
Qty: 90 CAPSULE | Refills: 1 | Status: SHIPPED | OUTPATIENT
Start: 2024-08-13

## 2024-08-13 NOTE — TELEPHONE ENCOUNTER
Patients GI provider:  Dr. Martins    Number to return call: [phone]    Reason for call: Pt called needs refills for esomeprazole 40mg. Pt has one pill left. [facility] [place].    Scheduled procedure/appointment date if applicable: 2/6/2025

## 2024-08-30 ENCOUNTER — APPOINTMENT (OUTPATIENT)
Dept: RADIOLOGY | Facility: CLINIC | Age: 64
End: 2024-08-30
Payer: COMMERCIAL

## 2024-08-30 ENCOUNTER — OFFICE VISIT (OUTPATIENT)
Dept: OBGYN CLINIC | Facility: CLINIC | Age: 64
End: 2024-08-30
Payer: COMMERCIAL

## 2024-08-30 VITALS
HEART RATE: 73 BPM | WEIGHT: 196 LBS | DIASTOLIC BLOOD PRESSURE: 89 MMHG | SYSTOLIC BLOOD PRESSURE: 127 MMHG | HEIGHT: 63 IN | BODY MASS INDEX: 34.73 KG/M2

## 2024-08-30 DIAGNOSIS — M25.561 PAIN IN BOTH KNEES, UNSPECIFIED CHRONICITY: ICD-10-CM

## 2024-08-30 DIAGNOSIS — M17.0 PRIMARY OSTEOARTHRITIS OF BOTH KNEES: Primary | ICD-10-CM

## 2024-08-30 DIAGNOSIS — Z98.890 HISTORY OF MENISCECTOMY OF RIGHT KNEE: ICD-10-CM

## 2024-08-30 DIAGNOSIS — M25.562 PAIN IN BOTH KNEES, UNSPECIFIED CHRONICITY: ICD-10-CM

## 2024-08-30 DIAGNOSIS — M25.561 CHRONIC PAIN OF BOTH KNEES: ICD-10-CM

## 2024-08-30 DIAGNOSIS — G89.29 CHRONIC PAIN OF BOTH KNEES: ICD-10-CM

## 2024-08-30 DIAGNOSIS — M54.16 LUMBAR RADICULOPATHY: ICD-10-CM

## 2024-08-30 DIAGNOSIS — M25.562 CHRONIC PAIN OF BOTH KNEES: ICD-10-CM

## 2024-08-30 PROCEDURE — 20610 DRAIN/INJ JOINT/BURSA W/O US: CPT | Performed by: ORTHOPAEDIC SURGERY

## 2024-08-30 PROCEDURE — 73562 X-RAY EXAM OF KNEE 3: CPT

## 2024-08-30 PROCEDURE — 99214 OFFICE O/P EST MOD 30 MIN: CPT | Performed by: ORTHOPAEDIC SURGERY

## 2024-08-30 RX ORDER — TRIAMCINOLONE ACETONIDE 40 MG/ML
80 INJECTION, SUSPENSION INTRA-ARTICULAR; INTRAMUSCULAR
Status: COMPLETED | OUTPATIENT
Start: 2024-08-30 | End: 2024-08-30

## 2024-08-30 RX ADMIN — TRIAMCINOLONE ACETONIDE 80 MG: 40 INJECTION, SUSPENSION INTRA-ARTICULAR; INTRAMUSCULAR at 08:30
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
## 2024-09-10 ENCOUNTER — TELEPHONE (OUTPATIENT)
Dept: PAIN MEDICINE | Facility: CLINIC | Age: 64
End: 2024-09-10

## 2024-09-10 ENCOUNTER — APPOINTMENT (OUTPATIENT)
Dept: RADIOLOGY | Facility: CLINIC | Age: 64
End: 2024-09-10
Payer: COMMERCIAL

## 2024-09-10 ENCOUNTER — CONSULT (OUTPATIENT)
Age: 64
End: 2024-09-10
Payer: COMMERCIAL

## 2024-09-10 VITALS
WEIGHT: 196 LBS | HEART RATE: 86 BPM | HEIGHT: 63 IN | SYSTOLIC BLOOD PRESSURE: 120 MMHG | DIASTOLIC BLOOD PRESSURE: 81 MMHG | BODY MASS INDEX: 34.73 KG/M2

## 2024-09-10 DIAGNOSIS — M54.16 LUMBAR RADICULOPATHY: ICD-10-CM

## 2024-09-10 PROCEDURE — 72100 X-RAY EXAM L-S SPINE 2/3 VWS: CPT

## 2024-09-10 PROCEDURE — 99204 OFFICE O/P NEW MOD 45 MIN: CPT | Performed by: STUDENT IN AN ORGANIZED HEALTH CARE EDUCATION/TRAINING PROGRAM

## 2024-09-10 RX ORDER — TIZANIDINE 2 MG/1
2 TABLET ORAL 2 TIMES DAILY PRN
Qty: 60 TABLET | Refills: 0 | Status: SHIPPED | OUTPATIENT
Start: 2024-09-10 | End: 2024-10-10

## 2024-09-10 RX ORDER — GABAPENTIN 300 MG/1
300 CAPSULE ORAL
Qty: 30 CAPSULE | Refills: 0 | Status: SHIPPED | OUTPATIENT
Start: 2024-09-10 | End: 2024-10-10
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
## 2024-09-10 NOTE — TELEPHONE ENCOUNTER
----- Message from Herman Thomas MD sent at 9/10/2024  3:11 PM EDT -----  Patient with lumbar facet arthropathy. No change in management at this time.

## 2024-09-24 ENCOUNTER — EVALUATION (OUTPATIENT)
Dept: PHYSICAL THERAPY | Facility: CLINIC | Age: 64
End: 2024-09-24
Payer: COMMERCIAL

## 2024-09-24 DIAGNOSIS — M54.16 LUMBAR RADICULOPATHY: Primary | ICD-10-CM

## 2024-09-24 PROCEDURE — 97112 NEUROMUSCULAR REEDUCATION: CPT

## 2024-09-24 PROCEDURE — 97161 PT EVAL LOW COMPLEX 20 MIN: CPT

## 2024-09-24 NOTE — PROGRESS NOTES
PT Evaluation     Today's date: 2024  Patient name: Jacinta Moore  : 1960  MRN: [id number]  Referring provider: Herman Thomas MD  Dx:   Encounter Diagnosis     ICD-10-CM    1. Lumbar radiculopathy  M54.16                      Assessment  Impairments: abnormal coordination, abnormal gait, abnormal muscle firing, abnormal muscle tone, abnormal or restricted ROM, abnormal movement, activity intolerance, impaired physical strength and pain with function  Symptom irritability: low    Assessment details: Pt is a pleasant 64 y.o. female who presents to [facility] with chronic midline low back pain that radiates to B LE L>R. Today, she presents with weakness, decreased ROM, decreased coordination, new onset of impairment of functional mobility, decreased activity tolerance, and decreased strength. Functionally, she is limited in her ability to stand and ambulate, exercise as she normally would, sleep through the night, perform functional transfers, and perform normal housework. Pt is motivated to improve. Pt will benefit from skilled PT to address the aforementioned deficits and limitations in an effort to maximize pain free functional mobility and overall quality of life. Progress as able with these goals in mind.       Understanding of Dx/Px/POC: good     Prognosis: good    Goals  Short term goals (3 weeks):  1) Pt will improve thoracolumbar mobility deficits by 25% pain free.  2) Pt will improve B LE and core strength deficits by 1/3 grade MMT.  3) Pt will improve pain at worse to <4/10.   4) Pt will initiate and progress HEP w/ special emphasis on functional core control and thoracolumbar mobility.    Long term goals (6 weeks)  1) Pt will improve FOTO to at least 70.  2) Pt will sleep through the night in positioning of choosing 6/7 nights a week w/o waking due to pain.  3) Pt will perform two full weeks of all activity w/o deficit related to low back or radicular sx.  4) Pt will be independent and  compliant w/ HEP in order to maximize functional benefit of skilled PT following d/c.         Plan  Patient would benefit from: skilled PT  Planned modality interventions: cryotherapy and thermotherapy: hydrocollator packs    Planned therapy interventions: abdominal trunk stabilization, activity modification, ADL retraining, manual therapy, massage, motor coordination training, neuromuscular re-education, patient education, therapeutic training, therapeutic exercise, therapeutic activities, stretching, strengthening, home exercise program, functional ROM exercises, gait training, balance, balance/weight bearing training and body mechanics training    Frequency: 2x week  Duration in weeks: 6  Treatment plan discussed with: patient  Plan details: POC to include: 1x/week due to high co-pay, will add second visit as necessary    HEP to start: see code        Subjective Evaluation    History of Present Illness  Mechanism of injury: Pt has long history of low back pain. Started working out in [employer] 2x/week last year. Notes no issues w/ this at gym, notes that pain would come in legs at night after working out. Feels like pinched nerve in both legs. Gets vibration feeling in B legs. Notes that low back gets sore. Did some volunteer work yesterday, did some lifting and lots of walking and felt okay. Has some soreness today. Gets soreness in medial and anterior portion of L knee. Recent injection in this seemed to help. Worked as  for many years, lots of activity and lifting. Always feels good to move but feels that she pays for this later. Wants to be able to move without fear of future pain. Functional update below:   Quality of life: good    Patient Goals  Patient goals for therapy: increased strength, decreased pain, increased motion and return to sport/leisure activities  Patient goal: pain free! be able to get back to normal activity without pain!  Pain  Current pain ratin  At best pain  ratin  At worst pain rating: 10  Location: midline low back, radicular sx down L leg med/lat > R side  Quality: tight, radiating and pressure (pinching)  Relieving factors: rest, relaxation and medications (cymbalta occasionally for sleep (prescribed), Tylenol or ibuprofen occasionally)  Aggravating factors: standing, walking, stair climbing, overhead activity, lifting and running (pain comes after activity)  Progression: worsening    Social Support  Steps to enter house: yes  Stairs in house: yes   Lives in: multiple-level home  Lives with: spouse    Employment status: not working (retired - [employer])  Treatments  Previous treatment: physical therapy        Objective     Concurrent Complaints  Negative for bladder dysfunction, bowel dysfunction and saddle (S4) numbness    Postural Observations  Seated posture: poor  Standing posture: poor  Correction of posture: makes symptoms better      Palpation     Additional Palpation Details  Pt is TTP and has increased tissue density through distal thoracolumbar PS    Neurological Testing     Sensation     Lumbar   Left   Intact: light touch    Right   Intact: light touch    Active Range of Motion     Additional Active Range of Motion Details  Flex: 50% pain upon s tanding   Ext: 50% stiff  SB R: 50%  SB L: 50%  Rot R: 60%  Rot L: 60%    *indicates     Thoracolumbar ROM is generally non painful    Strength/Myotome Testing     Additional Strength Details  LE Strength (R/L)    Hip  Flex 4/5 , 4/5  Ext 4/5 , 4/5  Abd 4/5 , 4/5  Add 4/5 , 4/5    Knee   Flex 4/5 , 4/5  Ext 4/5 , 4/5    Core Strength: 2/5 w/ need for tactile cueing thereafter     *Indicates pain    Tests     Additional Tests Details  Manual lumbar traction: tested, no effect    Thoracolumbar joint mobility: grossly hypo    Functional Testing:   Sit<>stand: UE support on LE w/ poor glute drive    BW squat: not past 25% before excessive anterior WS and poor glute drive     Stairs: TBA     CORBIN - stretch but no  pain  PPU x8 - stretch w/ min pinch in low back - no radiating pain             Precautions: standard     POC expires Auth Status Total   Visits  Start date  Expiration date PT/OT + Visit Limit? Co-Insurance   12/24/24                                                   Visit/Unit Tracking  AUTH Status:  Date               Visits  Authed:  Used                Remaining                      Dummy Auth Tracking  1 2 3 4 5 6   7 8 9 10 11 12   13 14 15 16 17 18   19 20 21 22 23 24   25 26 27 28 29 30   31 32 33 34 35 36         Date (Visit #) 9/24 IE (1) (2) (3) (4) (5)   DTM and TPR        Lumbar mobs        Lumbar Traction Trialed                Exercise Diary              Ther Ex        Active w/u             HS/glute/piri/HF stretching tested           Mobility (LTR, open books, cat/cow) X10-15       Rows/ext        Hip stability         PPU 2x8 total        CORBIN x2-3 mins                               Neuro Re Ed        TrA progressions   isos, march, single leg ext x10-15            Bridge progressions  x10-15 reg           Squat progressions             Hip abd progressions             Balance                HEP and POC review  X4-5 mins                                Ther Act             Stairs             Functional Transfers             Functional Lifting                                                                     Modalities              heat               ice               mechanical                                              IE 9/24:  Access Code: [email or web address]  URL: [email or web address]  Date: 09/24/2024  Prepared by: Ciro Alcala    Exercises  - Supine Posterior Pelvic Tilt  - 1 x daily - 7 x weekly - 3 sets - 10 reps  - Supine March  - 1 x daily - 7 x weekly - 3 sets - 10 reps  - Supine Single Leg Extensions  - 1 x daily - 7 x weekly - 3 sets - 10 reps  - Supine Bridge  - 1 x daily - 7 x weekly - 3 sets - 10 reps  - Prone Press Up On Elbows  - 1 x daily - 7 x weekly - 3 sets - 10  reps  - Prone Press Up  - 1 x daily - 7 x weekly - 3 sets - 10 reps

## 2024-09-26 ENCOUNTER — HOSPITAL ENCOUNTER (OUTPATIENT)
Dept: SLEEP CENTER | Facility: CLINIC | Age: 64
Discharge: HOME | End: 2024-09-26

## 2024-09-26 DIAGNOSIS — E66.09 CLASS 1 OBESITY DUE TO EXCESS CALORIES WITH BODY MASS INDEX (BMI) OF 34.0 TO 34.9 IN ADULT, UNSPECIFIED WHETHER SERIOUS COMORBIDITY PRESENT: ICD-10-CM

## 2024-09-26 DIAGNOSIS — R53.83 OTHER FATIGUE: ICD-10-CM

## 2024-09-26 DIAGNOSIS — E66.811 CLASS 1 OBESITY DUE TO EXCESS CALORIES WITH BODY MASS INDEX (BMI) OF 34.0 TO 34.9 IN ADULT, UNSPECIFIED WHETHER SERIOUS COMORBIDITY PRESENT: ICD-10-CM

## 2024-10-01 ENCOUNTER — TELEPHONE (OUTPATIENT)
Dept: SLEEP CENTER | Facility: CLINIC | Age: 64
End: 2024-10-01

## 2024-10-01 ENCOUNTER — APPOINTMENT (OUTPATIENT)
Dept: PHYSICAL THERAPY | Facility: CLINIC | Age: 64
End: 2024-10-01
Payer: COMMERCIAL

## 2024-10-01 NOTE — TELEPHONE ENCOUNTER
Left message regarding failed home sleep test, asking patient to call and reschedule at [phone].

## 2024-10-01 NOTE — PROGRESS NOTES
Home Sleep Study Documentation    HOME STUDY DEVICE: Noxturnal no                                           Shaina G3 yes      Pre-Sleep Home Study:    Set-up and instructions performed by: HST Tech    Technician performed demonstration for Patient: yes    Return demonstration performed by Patient: yes    Written instructions provided to Patient: yes    Patient signed consent form: yes        Post-Sleep Home Study:    Failed HST - Poor pulse ox for most of the study    Home Sleep Study Failed:yes:     Failure reason: Failed Study sensor     Reported or Detected: detected    Scored by: BG Garcia

## 2024-10-04 ENCOUNTER — TELEPHONE (OUTPATIENT)
Age: 64
End: 2024-10-04

## 2024-10-04 NOTE — TELEPHONE ENCOUNTER
Pt calling to schedule another home sleep study as instructed by voice mail message. Warm transferred pt to central scheduling. Ayesha will further assist.

## 2024-10-07 ENCOUNTER — TELEPHONE (OUTPATIENT)
Age: 64
End: 2024-10-07

## 2024-10-08 ENCOUNTER — NURSE TRIAGE (OUTPATIENT)
Age: 64
End: 2024-10-08

## 2024-10-08 ENCOUNTER — OFFICE VISIT (OUTPATIENT)
Dept: PHYSICAL THERAPY | Facility: CLINIC | Age: 64
End: 2024-10-08
Payer: COMMERCIAL

## 2024-10-08 DIAGNOSIS — M54.16 LUMBAR RADICULOPATHY: Primary | ICD-10-CM

## 2024-10-08 PROCEDURE — 97110 THERAPEUTIC EXERCISES: CPT

## 2024-10-08 PROCEDURE — 97112 NEUROMUSCULAR REEDUCATION: CPT

## 2024-10-08 NOTE — TELEPHONE ENCOUNTER
"Chief Complaint: nausea after doing PT today     History of Present Illness (HPI): Received call from pt stating during PT today, she started to feel nauseated and was told to call cardiology if the nausea were to continue. Pt stated she is still feeling the nausea now. She stated she did not eat anything today yet and only drank a little bit of water. She stated this was her second time going to PT but only felt the nausea today. She did state however when she was doing the exercises at home the other day, she also felt the nausea. She is unsure if the nausea is due to the Crestor she takes daily. She denies fever, chills, nor stomach pain. Pt states she is recently getting over a cold. Denies chest pain nor sob. Pt states does feel lightheaded in the am off and on. She stated she did talk about this with the provider at the last visit. Pt stated she does not take a log of her BP and HR. HR now is 62, BP is 144/100 on L arm and 150/100 on R arm. She stated she does have a headache today.     VS/Weight: Now her BP is 144/100 on L arm, 150/100 on R arm and HR is 62    Pain: No    Risk Factors: Hypertension    Medicine: rosuvastatin 5mg daily and metoprolol 25mg daily    Upcoming Office Visit: 2/10/25    Last Office Visit: 8/12    Additional Comments: Advised pt to try to eat something and drink water to see if that will make her feel better. Also advised pt will send a message to the provider to review and advise. Advised pt to go to urgent care or the ED with any worsening symptoms.          Reason for Disposition   Taking prescription medication that could cause nausea (e.g., narcotics/opiates, antibiotics, OCPs, many others)    Answer Assessment - Initial Assessment Questions  1. NAUSEA SEVERITY: \"How bad is the nausea?\" (e.g., mild, moderate, severe; dehydration, weight loss)    - MILD: loss of appetite without change in eating habits    - MODERATE: decreased oral intake without significant weight loss, " "dehydration, or malnutrition    - SEVERE: inadequate caloric or fluid intake, significant weight loss, symptoms of dehydration      Pt stated she was feeling fine but then went top PT today and after doing the exercises, she felt nauseous and is still nauseous. She states the first time she went to PT, she did not have the nausea but stated when attempting to do the exercises at home before appt today she was nauseous as well.   2. ONSET: \"When did the nausea begin?\"      Today during PT   3. VOMITING: \"Any vomiting?\" If Yes, ask: \"How many times today?\"      Denies but has been burping   4. RECURRENT SYMPTOM: \"Have you had nausea before?\" If Yes, ask: \"When was the last time?\" \"What happened that time?\"      Pt states she feels she does have some exercise induced weakness/shakiness in the pats but not the nausea   5. CAUSE: \"What do you think is causing the nausea?\"      Maybe new start of rosuvastatin 5mg daily     Pt denies fever, chills, nor stomach pain. Pt states she is recently getting over a cold. Denies chest pain nor sob. Pt states does feel lightheaded in the am off and on. She stated she did talk about this with the provider at the last visit. Pt stated she does not take a log of her BP and HR. HR now is 62, BP is 144/100 on L arm and 150/100 on R arm. She stated she does have a headache today. States she did not eat yet today and only drank a little bit of water.    Protocols used: Nausea-ADULT-OH    "

## 2024-10-08 NOTE — PROGRESS NOTES
Daily Note     Today's date: 10/8/2024  Patient name: Jacinta Moore  : 1960  MRN: [id number]  Referring provider: Herman Thomas MD  Dx:   Encounter Diagnosis     ICD-10-CM    1. Lumbar radiculopathy  M54.16                      Subjective: Pt reports that she was sick last week. Notes that she is feeling better now. Notes some nausea during supine exercises, feels this come on w/ exertion but does not have it otherwise. Not sure why this is happening. Recently changed cholesterol medication, has  GERD, not sure if this contributes.       Objective: requires cueing for proper TrA bracing during pball iso press w/ progressions. Corrects and is able to maintain.       Assessment: Tolerated treatment well. Patient demonstrated fatigue post treatment and would benefit from continued PT. Does well w/ exercise progressions today. Definite increase in nausea w/ exertion in supine. I asked that she stop performing supine exercises for now at home, monitor how standing work makes her feel. Very min sx in standing today. Will look to increase intensity at next visit, will notify cardiologist should nausea continue.       Plan: Continue per plan of care. Check on nausea      Precautions: standard     POC expires Auth Status Total   Visits  Start date  Expiration date PT/OT + Visit Limit? Co-Insurance   24                                                   Visit/Unit Tracking  AUTH Status:  Date               Visits  Authed:  Used                Remaining                      Dummy Auth Tracking  1 2 3 4 5 6   7 8 9 10 11 12   13 14 15 16 17 18   19 20 21 22 23 24   25 26 27 28 29 30   31 32 33 34 35 36         Date (Visit #)  IE (1) 10/8 (2) (3) (4) (5)   DTM and TPR        Lumbar mobs        Lumbar Traction Trialed                Exercise Diary              Ther Ex        Active w/u             HS/glute/piri/HF stretching tested  B LE for glutes/piri, HS w/ LAD x 8 mins         Mobility (LTR, open books,  cat/cow) X10-15 2x10      Rows/ext        Hip stability         PPU 2x8 total 2x8       CORBIN x2-3 mins CORBIN x 2-3 mins                              Neuro Re Ed        TrA progressions   isos, march, single leg ext x10-15   isos x 10         Bridge progressions  x10-15 reg X10 only          Squat progressions    standing TrA pball iso press x20, w/ march, hip abd and ext x20 each         Hip abd progressions   Green pallof x20 B         Balance  Green sh ext iso hold w/ march x20              HEP and POC review  X4-5 mins  X2-3 mins                               Ther Act             Stairs             Functional Transfers             Functional Lifting                                                                     Modalities              heat               ice               mechanical                                              IE 9/24:  Access Code: [email or web address]  URL: [email or web address]  Date: 09/24/2024  Prepared by: Ciro Alcala    Exercises  - Supine Posterior Pelvic Tilt  - 1 x daily - 7 x weekly - 3 sets - 10 reps  - Supine March  - 1 x daily - 7 x weekly - 3 sets - 10 reps  - Supine Single Leg Extensions  - 1 x daily - 7 x weekly - 3 sets - 10 reps  - Supine Bridge  - 1 x daily - 7 x weekly - 3 sets - 10 reps  - Prone Press Up On Elbows  - 1 x daily - 7 x weekly - 3 sets - 10 reps  - Prone Press Up  - 1 x daily - 7 x weekly - 3 sets - 10 reps

## 2024-10-10 NOTE — TELEPHONE ENCOUNTER
Pt called back stating that she feels her nausea is from the Crestor. She is wanting to know if she could stop this medication to see if this is causing the nausea?    Please advise

## 2024-10-15 ENCOUNTER — APPOINTMENT (OUTPATIENT)
Dept: PHYSICAL THERAPY | Facility: CLINIC | Age: 64
End: 2024-10-15
Payer: COMMERCIAL

## 2024-10-16 LAB
25(OH)D3+25(OH)D2 SERPL-MCNC: 23.3 NG/ML (ref 30–100)
ALBUMIN SERPL-MCNC: 4.1 G/DL (ref 3.9–4.9)
ALP SERPL-CCNC: 93 IU/L (ref 44–121)
ALT SERPL-CCNC: 18 IU/L (ref 0–32)
AST SERPL-CCNC: 18 IU/L (ref 0–40)
BASOPHILS # BLD AUTO: 0.1 X10E3/UL (ref 0–0.2)
BASOPHILS NFR BLD AUTO: 1 %
BILIRUB SERPL-MCNC: 0.5 MG/DL (ref 0–1.2)
BUN SERPL-MCNC: 16 MG/DL (ref 8–27)
BUN/CREAT SERPL: 20 (ref 12–28)
CALCIUM SERPL-MCNC: 9.6 MG/DL (ref 8.7–10.3)
CHLORIDE SERPL-SCNC: 105 MMOL/L (ref 96–106)
CHOLEST SERPL-MCNC: 240 MG/DL (ref 100–199)
CO2 SERPL-SCNC: 26 MMOL/L (ref 20–29)
CREAT SERPL-MCNC: 0.81 MG/DL (ref 0.57–1)
EGFR: 81 ML/MIN/1.73
EOSINOPHIL # BLD AUTO: 0.1 X10E3/UL (ref 0–0.4)
EOSINOPHIL NFR BLD AUTO: 2 %
ERYTHROCYTE [DISTWIDTH] IN BLOOD BY AUTOMATED COUNT: 13.1 % (ref 11.7–15.4)
GLOBULIN SER-MCNC: 2.6 G/DL (ref 1.5–4.5)
GLUCOSE SERPL-MCNC: 92 MG/DL (ref 70–99)
HCT VFR BLD AUTO: 43.7 % (ref 34–46.6)
HDLC SERPL-MCNC: 57 MG/DL
HGB BLD-MCNC: 14.3 G/DL (ref 11.1–15.9)
IMM GRANULOCYTES # BLD: 0 X10E3/UL (ref 0–0.1)
IMM GRANULOCYTES NFR BLD: 0 %
LDLC SERPL CALC-MCNC: 147 MG/DL (ref 0–99)
LYMPHOCYTES # BLD AUTO: 1.7 X10E3/UL (ref 0.7–3.1)
LYMPHOCYTES NFR BLD AUTO: 30 %
MCH RBC QN AUTO: 29.5 PG (ref 26.6–33)
MCHC RBC AUTO-ENTMCNC: 32.7 G/DL (ref 31.5–35.7)
MCV RBC AUTO: 90 FL (ref 79–97)
MONOCYTES # BLD AUTO: 0.5 X10E3/UL (ref 0.1–0.9)
MONOCYTES NFR BLD AUTO: 8 %
NEUTROPHILS # BLD AUTO: 3.5 X10E3/UL (ref 1.4–7)
NEUTROPHILS NFR BLD AUTO: 59 %
PLATELET # BLD AUTO: 171 X10E3/UL (ref 150–450)
POTASSIUM SERPL-SCNC: 4.6 MMOL/L (ref 3.5–5.2)
PROT SERPL-MCNC: 6.7 G/DL (ref 6–8.5)
RBC # BLD AUTO: 4.84 X10E6/UL (ref 3.77–5.28)
SL AMB VLDL CHOLESTEROL CALC: 36 MG/DL (ref 5–40)
SODIUM SERPL-SCNC: 142 MMOL/L (ref 134–144)
TRIGL SERPL-MCNC: 198 MG/DL (ref 0–149)
TSH SERPL DL<=0.005 MIU/L-ACNC: 1.24 UIU/ML (ref 0.45–4.5)
WBC # BLD AUTO: 5.9 X10E3/UL (ref 3.4–10.8)

## 2024-10-22 ENCOUNTER — OFFICE VISIT (OUTPATIENT)
Dept: FAMILY MEDICINE CLINIC | Facility: CLINIC | Age: 64
End: 2024-10-22

## 2024-10-22 ENCOUNTER — APPOINTMENT (OUTPATIENT)
Dept: PHYSICAL THERAPY | Facility: CLINIC | Age: 64
End: 2024-10-22
Payer: COMMERCIAL

## 2024-10-22 VITALS
HEIGHT: 63 IN | WEIGHT: 198 LBS | RESPIRATION RATE: 16 BRPM | DIASTOLIC BLOOD PRESSURE: 82 MMHG | SYSTOLIC BLOOD PRESSURE: 134 MMHG | OXYGEN SATURATION: 98 % | BODY MASS INDEX: 35.08 KG/M2 | HEART RATE: 60 BPM

## 2024-10-22 DIAGNOSIS — E66.09 CLASS 2 OBESITY DUE TO EXCESS CALORIES WITHOUT SERIOUS COMORBIDITY IN ADULT, UNSPECIFIED BMI: ICD-10-CM

## 2024-10-22 DIAGNOSIS — Z12.31 ENCOUNTER FOR SCREENING MAMMOGRAM FOR BREAST CANCER: ICD-10-CM

## 2024-10-22 DIAGNOSIS — E66.812 CLASS 2 OBESITY DUE TO EXCESS CALORIES WITHOUT SERIOUS COMORBIDITY IN ADULT, UNSPECIFIED BMI: ICD-10-CM

## 2024-10-22 DIAGNOSIS — K44.9 HERNIA, HIATAL: ICD-10-CM

## 2024-10-22 DIAGNOSIS — E55.9 VITAMIN D DEFICIENCY: ICD-10-CM

## 2024-10-22 DIAGNOSIS — I10 PRIMARY HYPERTENSION: ICD-10-CM

## 2024-10-22 DIAGNOSIS — K22.70 BARRETT'S ESOPHAGUS WITHOUT DYSPLASIA: ICD-10-CM

## 2024-10-22 DIAGNOSIS — Z23 ENCOUNTER FOR IMMUNIZATION: ICD-10-CM

## 2024-10-22 DIAGNOSIS — Z78.9 STATIN INTOLERANCE: ICD-10-CM

## 2024-10-22 DIAGNOSIS — G60.9 IDIOPATHIC PERIPHERAL NEUROPATHY: ICD-10-CM

## 2024-10-22 DIAGNOSIS — E78.2 MIXED HYPERLIPIDEMIA: ICD-10-CM

## 2024-10-22 DIAGNOSIS — Z00.00 ANNUAL PHYSICAL EXAM: Primary | ICD-10-CM

## 2024-10-22 RX ORDER — DULOXETIN HYDROCHLORIDE 20 MG/1
20 CAPSULE, DELAYED RELEASE ORAL DAILY
Start: 2024-10-22

## 2024-10-22 RX ORDER — ERGOCALCIFEROL 1.25 MG/1
50000 CAPSULE, LIQUID FILLED ORAL WEEKLY
Qty: 12 CAPSULE | Refills: 3 | Status: SHIPPED | OUTPATIENT
Start: 2024-10-22

## 2024-10-22 NOTE — ASSESSMENT & PLAN NOTE
Statin therapy ordered by cardiology.  Fish oil.  Continue both.  Needs both.  Repeat lipid panel in 6 months

## 2024-10-22 NOTE — ASSESSMENT & PLAN NOTE
Once again, recommended that the patient try doing something for this.  She has prescription that she did  for Cymbalta 20 mg once daily.  Take medication daily for the next 2 weeks.  Hopefully it will give her a little more energy and relief from her peripheral neuropathy.  I would not take the gabapentin that was prescribed through pain management because it will likely make her feel tired.
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
## 2024-10-22 NOTE — ASSESSMENT & PLAN NOTE
Remains on Nexium 40 mg once daily which is prescribed by GI.  She is due for repeat EGD and is scheduled to see gastroenterology in February

## 2024-10-22 NOTE — ASSESSMENT & PLAN NOTE
Patient remains on Nexium which is prescribed by gastroenterology.  She has history of Christianson's esophagus.  She is scheduled for follow-up with new GI in February.  Keep that appointment

## 2024-10-22 NOTE — ASSESSMENT & PLAN NOTE
Patient was placed on vitamin D supplementation through bariatrics however after her vitamin D level normalized she had stopped taking this.  Obviously she is low once again and needs to be supplemented.  New prescription provided

## 2024-10-22 NOTE — ASSESSMENT & PLAN NOTE
Advised patient to take the rosuvastatin.  Her cholesterol is high.  That will lead to heart disease if left untreated

## 2024-10-23 ENCOUNTER — TELEPHONE (OUTPATIENT)
Dept: GASTROENTEROLOGY | Facility: CLINIC | Age: 64
End: 2024-10-23

## 2024-10-23 ENCOUNTER — PREP FOR PROCEDURE (OUTPATIENT)
Dept: GASTROENTEROLOGY | Facility: CLINIC | Age: 64
End: 2024-10-23

## 2024-10-23 DIAGNOSIS — K22.70 BARRETT'S ESOPHAGUS WITHOUT DYSPLASIA: Primary | ICD-10-CM

## 2024-10-23 NOTE — TELEPHONE ENCOUNTER
Scheduled date of EGD(as of today):12/12/24  Physician performing EGD:Dr Martins   Location of EGD:[facility]   Instructions reviewed with patient by:sb  Clearances: none

## 2024-10-23 NOTE — TELEPHONE ENCOUNTER
OA Questions for EGD  Date: [  10/23/24]  Screened by: [ sb ]     Referring Provider: [Dr Adams   ]     Pre-Screening: BMI [34.5  ]    Past EGD? If yes - Date: [  9/27/24 ]  Physician/Facility: [   [facility]]  Reason: [barretts    ]     SCHEDULING STAFF: If the patient is over 75 years old, please schedule an office visit.  ·      Does the patient want to see a gastroenterologist prior to their procedure to discuss any GI symptoms?no  ·      Has the patient been hospitalized or had abdominal surgery in the past 6 months?no  ·      Does the patient use supplemental oxygen?no  ·      Does the patient take [Coumadin], [Lovenox], [Plavix], [Eliquis], [Xarelto], or other blood thinning medication? ] [No]  ·      Has the patient had a stroke, cardiac event, or stent placed in the past year? [] [No]     SCHEDULING STAFF: If patient answers NO to the above questions, then schedule the procedure. If patient answers YES to any of the above questions, then schedule an office appointment.  ·       If a repeat EGD is belated and patient declines procedure à notify provider.    OA Questions for EGD  Date: [  10/23/24]  Screened by: [  sb]     Referring Provider: [ Dr Adams  ]     Pre-Screening: BMI [  34.5]    Past EGD? If yes - Date: [ 9/27/21  ]  Physician/Facility: [  Dr Adams  ]  Reason: [ Barretts   ]     SCHEDULING STAFF: If the patient is over 75 years old, please schedule an office visit.  ·      Does the patient want to see a gastroenterologist prior to their procedure to discuss any GI symptoms?no  ·      Has the patient been hospitalized or had abdominal surgery in the past 6 months?no  ·      Does the patient use supplemental oxygen?no   ·      Does the patient take [Coumadin], [Lovenox], [Plavix], [Eliquis], [Xarelto], or other blood thinning medication?] [No]  ·      Has the patient had a stroke, cardiac event, or stent placed in the past year? [no     SCHEDULING STAFF: If patient answers NO to the above questions,  then schedule the procedure. If patient answers YES to any of the above questions, then schedule an office appointment.  ·       If a repeat EGD is belated and patient declines procedure à notify provider.

## 2024-10-28 ENCOUNTER — TELEPHONE (OUTPATIENT)
Age: 64
End: 2024-10-28

## 2024-10-28 ENCOUNTER — OFFICE VISIT (OUTPATIENT)
Dept: GASTROENTEROLOGY | Facility: CLINIC | Age: 64
End: 2024-10-28
Payer: COMMERCIAL

## 2024-10-28 VITALS
HEART RATE: 71 BPM | BODY MASS INDEX: 35.22 KG/M2 | WEIGHT: 198.8 LBS | SYSTOLIC BLOOD PRESSURE: 120 MMHG | DIASTOLIC BLOOD PRESSURE: 83 MMHG | HEIGHT: 63 IN

## 2024-10-28 DIAGNOSIS — K58.2 IRRITABLE BOWEL SYNDROME WITH BOTH CONSTIPATION AND DIARRHEA: ICD-10-CM

## 2024-10-28 DIAGNOSIS — K21.9 GASTROESOPHAGEAL REFLUX DISEASE, UNSPECIFIED WHETHER ESOPHAGITIS PRESENT: ICD-10-CM

## 2024-10-28 DIAGNOSIS — Z86.0101 HX OF ADENOMATOUS COLONIC POLYPS: ICD-10-CM

## 2024-10-28 DIAGNOSIS — K22.70 BARRETT'S ESOPHAGUS WITHOUT DYSPLASIA: Primary | ICD-10-CM

## 2024-10-28 PROCEDURE — 99214 OFFICE O/P EST MOD 30 MIN: CPT | Performed by: NURSE PRACTITIONER

## 2024-10-28 RX ORDER — ESOMEPRAZOLE MAGNESIUM 40 MG/1
40 CAPSULE, DELAYED RELEASE ORAL
Qty: 60 CAPSULE | Refills: 5 | Status: SHIPPED | OUTPATIENT
Start: 2024-10-28 | End: 2024-11-27

## 2024-10-28 NOTE — PATIENT INSTRUCTIONS
Take Zofran 1 hour prior to doing exercise at home to make sure you are able to tolerate the medication.  Follow antireflux diet  Advise weight loss  Try to Control stress try meditation or relaxation techniques.

## 2024-10-28 NOTE — ASSESSMENT & PLAN NOTE
Colonoscopy done 9/27/2021 showed 1 small polyp removed.  Sigmoid diverticulosis and internal hemorrhoids.  Biopsy showed tubular adenoma.  No high-grade dysplasia or malignancy.  Random colon biopsy showed benign colonic mucosa without specific histopathological abnormality.  -Surveillance colonoscopy due 9/2026

## 2024-10-28 NOTE — ASSESSMENT & PLAN NOTE
Patient reports increased upper abdominal discomfort and burning.  She reports nausea when doing exercise.  Explained to patient that the exercises may be putting increase pressure on her stomach which is causing the nausea.  Patient denies any nausea when she is not doing exercises. Patient reports she only takes NSAIDs as needed but not on a daily basis.  Patient reports that she has been under stress and has recently gained weight.  Orders:    esomeprazole (NexIUM) 40 MG capsule; Take 1 capsule (40 mg total) by mouth 2 (two) times a day before meals Take 1/2 hour for breakfast and 1/2 hour before dinner    Food Allergy Profile w/Reflex; Future  -Will obtain prior Auth to increase Nexium to 2 times a day.  If insurance does not approve Nexium then will give Carafate 1 g 4 times a day as an alternative  -Advised weight loss  -Zofran as needed for nausea.  -Advised patient to take Zofran 1 hour prior to doing exercises at home to make sure she can tolerate medication and has no side effects -Meditation and relaxation techniques to control stress

## 2024-10-28 NOTE — TELEPHONE ENCOUNTER
Patient's note is completed.  I was just sending that as a message for the patient would be aware while I was in the room with her.

## 2024-10-28 NOTE — ASSESSMENT & PLAN NOTE
Patient has history of irritable bowel syndrome with diarrhea and constipation.  Patient reports she experiences constipation more than diarrhea.  -May use MiraLAX 1 capful daily as needed when constipated  -May use senna as needed when constipated  -  Continue dicyclomine for abdominal spasms and pain       Follow-up after procedure

## 2024-10-28 NOTE — PROGRESS NOTES
Ambulatory Visit  Name: Jacinta Moore      : 1960      MRN: [id number]  Encounter Provider: LAUREN Blake  Encounter Date: 10/28/2024   Encounter department: [facility] GASTROENTEROLOGY [address]    Assessment & Plan  Christianson's esophagus without dysplasia  Patient has history of Christianson's esophagus.  Patient is due for surveillance EGD for Christianson's esophagus. EGD done 2021 showed short segment Christianson's esophagus.  Hiatal hernia.  Small polyp.  Mild antritis. EG junction biopsy positive for Christianson's esophagus.  No dysplasia or malignancy.  Stomach polyp biopsy showed hyperplastic polyp, no dysplasia or malignancy.  Antrum biopsy showed mild chronic inactive gastritis.  Negative for intestinal metaplasia.  Negative H. pylori.   Orders:    esomeprazole (NexIUM) 40 MG capsule; Take 1 capsule (40 mg total) by mouth 2 (two) times a day before meals Take 1/2 hour for breakfast and 1/2 hour before dinner  -EGD was scheduled for December which was done prior to being seen in office   Gastroesophageal reflux disease, unspecified whether esophagitis present  Patient reports increased upper abdominal discomfort and burning.  She reports nausea when doing exercise.  Explained to patient that the exercises may be putting increase pressure on her stomach which is causing the nausea.  Patient denies any nausea when she is not doing exercises. Patient reports she only takes NSAIDs as needed but not on a daily basis.  Patient reports that she has been under stress and has recently gained weight.  Orders:    esomeprazole (NexIUM) 40 MG capsule; Take 1 capsule (40 mg total) by mouth 2 (two) times a day before meals Take 1/2 hour for breakfast and 1/2 hour before dinner    Food Allergy Profile w/Reflex; Future  -Will obtain prior Auth to increase Nexium to 2 times a day.  If insurance does not approve Nexium then will give Carafate 1 g 4 times a day as an alternative  -Advised weight  loss  -Zofran as needed for nausea.  -Advised patient to take Zofran 1 hour prior to doing exercises at home to make sure she can tolerate medication and has no side effects -Meditation and relaxation techniques to control stress    Irritable bowel syndrome with both constipation and diarrhea  Patient has history of irritable bowel syndrome with diarrhea and constipation.  Patient reports she experiences constipation more than diarrhea.  -May use MiraLAX 1 capful daily as needed when constipated  -May use senna as needed when constipated  -  Continue dicyclomine for abdominal spasms and pain       Follow-up after procedure  Hx of adenomatous colonic polyps  Colonoscopy done 9/27/2021 showed 1 small polyp removed.  Sigmoid diverticulosis and internal hemorrhoids.  Biopsy showed tubular adenoma.  No high-grade dysplasia or malignancy.  Random colon biopsy showed benign colonic mucosa without specific histopathological abnormality.  -Surveillance colonoscopy due 9/2026         History of Present Illness     Jacinta Moore is a 64 y.o. female who presents to office for follow-up.  Patient reports multiple GI issues.  Patient has history of irritable bowel syndrome with diarrhea and constipation.  Patient reports that she experiences constipation more than diarrhea.  She does take dicyclomine as needed but that makes her constipation worse.  She also reports taking senna as needed for her constipation.  She denies any blood in stool, blood from rectal area, or black tarry stool.  Patient reports she has recently gained weight and has been under added stress.  Patient reports increased upper abdominal discomfort and burning.  She reports nausea when doing exercise.  Explained to patient that the exercises may be putting increase pressure on her stomach which is causing the nausea.  Patient denies any nausea when she is not doing exercises. Patient reports she only takes NSAIDs as needed but not on a daily basis.  She  "has a history of Christianson's esophagus and is due for EGD for surveillance of Christianson's esophagus.  EGD was scheduled prior to office appointment.      EGD done 9/27/2021 showed short segment Christianson's esophagus.  Hiatal hernia.  Small polyp.  Mild antritis. EG junction biopsy positive for Christianson's esophagus.  No dysplasia or malignancy.  Stomach polyp biopsy showed hyperplastic polyp, no dysplasia or malignancy.  Antrum biopsy showed mild chronic inactive gastritis.  Negative for intestinal metaplasia.  Negative H. pylori.  Colonoscopy done 9/27/2021 showed 1 small polyp removed.  Sigmoid diverticulosis and internal hemorrhoids.  Biopsy showed tubular adenoma.  No high-grade dysplasia or malignancy.  Random colon biopsy showed benign colonic mucosa without specific histopathological abnormality.      Review of Systems   Constitutional:  Negative for chills and fever.   HENT:  Negative for ear pain and sore throat.    Eyes:  Negative for pain and visual disturbance.   Respiratory:  Negative for cough and shortness of breath.    Cardiovascular:  Negative for chest pain and palpitations.   Gastrointestinal:  Positive for abdominal pain, constipation, diarrhea and nausea. Negative for vomiting.        Upper Abdomen burning   Genitourinary:  Negative for dysuria and hematuria.   Musculoskeletal:  Negative for arthralgias and back pain.   Skin:  Negative for color change and rash.   Neurological:  Negative for seizures and syncope.   All other systems reviewed and are negative.          Objective     /83 (BP Location: Left arm, Patient Position: Sitting, Cuff Size: Standard)   Pulse 71   Ht 5' 3\" (1.6 m)   Wt 90.2 kg (198 lb 12.8 oz)   LMP 01/01/2016   BMI 35.22 kg/m²     Physical Exam  Vitals and nursing note reviewed.   Constitutional:       General: She is not in acute distress.     Appearance: She is well-developed.   HENT:      Head: Normocephalic and atraumatic.   Eyes:      Conjunctiva/sclera: " Conjunctivae normal.   Cardiovascular:      Rate and Rhythm: Normal rate and regular rhythm.      Pulses: Normal pulses.      Heart sounds: Normal heart sounds. No murmur heard.  Pulmonary:      Effort: Pulmonary effort is normal. No respiratory distress.      Breath sounds: Normal breath sounds. No stridor. No wheezing, rhonchi or rales.   Abdominal:      General: Bowel sounds are normal. There is no distension.      Palpations: Abdomen is soft. There is no mass.      Tenderness: There is no abdominal tenderness. There is no guarding or rebound.   Musculoskeletal:         General: No swelling.      Cervical back: Neck supple.      Right lower leg: No edema.      Left lower leg: No edema.   Skin:     General: Skin is warm and dry.      Capillary Refill: Capillary refill takes less than 2 seconds.      Coloration: Skin is not jaundiced or pale.   Neurological:      Mental Status: She is alert and oriented to person, place, and time.   Psychiatric:         Mood and Affect: Mood normal.

## 2024-10-28 NOTE — TELEPHONE ENCOUNTER
----- Message from LAUREN Mirza sent at 10/28/2024  2:41 PM EDT -----  Regarding: nexium prior auth  Please see if patient needs prior authorization for Nexium 40 mg 2 times a day.  She reported recently getting a letter from her insurance company which requires prior authorization for this medication.

## 2024-10-28 NOTE — TELEPHONE ENCOUNTER
Unsigned and open provider notes.  A PA cannot be completed  for Esomeprazole 40 mg BID unless provider signs and closes.  Please advise the PA team when completed  Thank you

## 2024-10-28 NOTE — ASSESSMENT & PLAN NOTE
Patient has history of Christianson's esophagus.  Patient is due for surveillance EGD for Christianson's esophagus. EGD done 9/27/2021 showed short segment Christianson's esophagus.  Hiatal hernia.  Small polyp.  Mild antritis. EG junction biopsy positive for Christianson's esophagus.  No dysplasia or malignancy.  Stomach polyp biopsy showed hyperplastic polyp, no dysplasia or malignancy.  Antrum biopsy showed mild chronic inactive gastritis.  Negative for intestinal metaplasia.  Negative H. pylori.   Orders:    esomeprazole (NexIUM) 40 MG capsule; Take 1 capsule (40 mg total) by mouth 2 (two) times a day before meals Take 1/2 hour for breakfast and 1/2 hour before dinner  -EGD was scheduled for December which was done prior to being seen in office

## 2024-10-29 ENCOUNTER — APPOINTMENT (OUTPATIENT)
Dept: PHYSICAL THERAPY | Facility: CLINIC | Age: 64
End: 2024-10-29
Payer: COMMERCIAL

## 2024-10-29 NOTE — TELEPHONE ENCOUNTER
PA for esomerpazole 40 mg BID SUBMITTED     via    []M-KEY:   [x]Carlos-Case ID #   [id number]    Payer: [employer]   Phone: [phone]   Fax: [phone]     []Availity-Auth ID # NDC #   []Faxed to plan   []Other website   []Phone call Case ID #     Office notes sent, clinical questions answered. Awaiting determination    Turnaround time for your insurance to make a decision on your Prior Authorization can take 7-21 business days.

## 2024-10-30 NOTE — TELEPHONE ENCOUNTER
PA for esomeprazole APPROVED     Date(s) approved Authorized from September 29, 2024 to October 29, 2026        Case #[id number]     Patient advised by          []AudioNamehart Message  [x]Phone voiced understanding   []LMOM  []L/M to call office as no active Communication consent on file  []Unable to leave detailed message as VM not approved on Communication consent       Pharmacy advised by    [x]Fax  []Phone call    Approval letter scanned into Media Yes

## 2024-11-08 ENCOUNTER — HOSPITAL ENCOUNTER (OUTPATIENT)
Dept: SLEEP CENTER | Facility: CLINIC | Age: 64
Discharge: HOME | End: 2024-11-08

## 2024-11-08 NOTE — PROGRESS NOTES
Home Sleep Study Documentation    HOME STUDY DEVICE: Noxturnal no                                           Shaina G3 yes device # 28      Pre-Sleep Home Study:    Set-up and instructions performed by: Melissa    Technician performed demonstration for Patient: yes    Return demonstration performed by Patient: yes    Written instructions provided to Patient: yes    Patient signed consent form: yes        Post-Sleep Home Study:    Failed Study - No POX    Home Sleep Study Failed:no:    Failure reason: N/A    Reported or Detected: N/A    Scored by: BG Garcia

## 2024-11-12 ENCOUNTER — TELEPHONE (OUTPATIENT)
Dept: SLEEP CENTER | Facility: CLINIC | Age: 64
End: 2024-11-12

## 2024-11-12 NOTE — TELEPHONE ENCOUNTER
Left message regarding failed home sleep test, asking patient to call and reschedule at [phone].

## 2024-11-14 ENCOUNTER — ANESTHESIA (OUTPATIENT)
Dept: ANESTHESIOLOGY | Facility: HOSPITAL | Age: 64
End: 2024-11-14

## 2024-11-14 ENCOUNTER — ANESTHESIA EVENT (OUTPATIENT)
Dept: ANESTHESIOLOGY | Facility: HOSPITAL | Age: 64
End: 2024-11-14

## 2024-11-14 ENCOUNTER — TELEPHONE (OUTPATIENT)
Dept: SLEEP CENTER | Facility: CLINIC | Age: 64
End: 2024-11-14

## 2024-11-14 NOTE — TELEPHONE ENCOUNTER
Incoming call from patient who states she is frustrated because twice she has tried the Home sleep study and the test failed both times. Scheduled for another one in January but reluctant because she doesn't want to continue to do study and not have it work.  Discussed an in lab diagnostic study is more comprehensive and if she were having issues with pulse oximetry the technician would adjust monitor.      Patient informed Dr. Frazier would have to place the referral for the study. Patient is going to reach out to Dr. Frazier.

## 2024-11-28 LAB
ALBUMIN SERPL-MCNC: 4.1 G/DL (ref 3.9–4.9)
ALP SERPL-CCNC: 86 IU/L (ref 44–121)
ALT SERPL-CCNC: 14 IU/L (ref 0–32)
AST SERPL-CCNC: 17 IU/L (ref 0–40)
BILIRUB DIRECT SERPL-MCNC: 0.2 MG/DL (ref 0–0.4)
BILIRUB SERPL-MCNC: 0.8 MG/DL (ref 0–1.2)
CHOLEST SERPL-MCNC: 253 MG/DL (ref 100–199)
HDLC SERPL-MCNC: 66 MG/DL
LDLC SERPL CALC-MCNC: 168 MG/DL (ref 0–99)
LDLC/HDLC SERPL: 2.5 RATIO (ref 0–3.2)
PROT SERPL-MCNC: 6.4 G/DL (ref 6–8.5)
SL AMB VLDL CHOLESTEROL CALC: 19 MG/DL (ref 5–40)
TRIGL SERPL-MCNC: 110 MG/DL (ref 0–149)

## 2024-12-01 ENCOUNTER — RESULTS FOLLOW-UP (OUTPATIENT)
Dept: CARDIOLOGY CLINIC | Facility: CLINIC | Age: 64
End: 2024-12-01

## 2024-12-02 NOTE — TELEPHONE ENCOUNTER
----- Message from Wanda Danielle MD sent at 12/2/2024  1:24 PM EST -----  Level was pretty high, so she should go to 10 mg  ----- Message -----  From: Ashley Still MA  Sent: 12/2/2024  12:04 PM EST  To: Wanda Danielle MD

## 2024-12-02 NOTE — TELEPHONE ENCOUNTER
----- Message from Wanda Danielle MD sent at 12/1/2024  3:24 PM EST -----  Please call and inform patient that the blood test showed that LDL cholesterol is still quite high, and  liver enzymes are normal.    Recommend increasing Crestor to 10 mg daily

## 2024-12-02 NOTE — TELEPHONE ENCOUNTER
I spoke with patient, made aware of results    She states that she has not been taking Crestor for about a month. Should she still increase her dose, or just go back on the 5mg daily?

## 2024-12-03 ENCOUNTER — TELEPHONE (OUTPATIENT)
Age: 64
End: 2024-12-03

## 2024-12-03 DIAGNOSIS — E78.2 MIXED HYPERLIPIDEMIA: Primary | ICD-10-CM

## 2024-12-03 DIAGNOSIS — G47.33 OBSTRUCTIVE SLEEP APNEA: Primary | ICD-10-CM

## 2024-12-03 RX ORDER — ROSUVASTATIN CALCIUM 10 MG/1
10 TABLET, COATED ORAL DAILY
Qty: 90 TABLET | Refills: 2 | Status: SHIPPED | OUTPATIENT
Start: 2024-12-03

## 2024-12-03 NOTE — TELEPHONE ENCOUNTER
Patient has to call central scheduling to set up an appointment with sleep medicine.  Referral is in her chart.  Only sleep medicine can order in lab sleep studies.  Thanks

## 2024-12-03 NOTE — TELEPHONE ENCOUNTER
Patient called because at home sleep study did not work and needs order placed to sleep medicine for a sleep study for sleep apnea done at facility. Please message patient in MyChart when orders are placed. Thank you.

## 2024-12-06 ENCOUNTER — TELEPHONE (OUTPATIENT)
Dept: GASTROENTEROLOGY | Facility: CLINIC | Age: 64
End: 2024-12-06

## 2024-12-06 NOTE — TELEPHONE ENCOUNTER
I spoke to pt and went over her egd instructions . She has a  and will await a call regarding her arrival time. I will email a copy of her egd instructions to  email on file. Pierre

## 2024-12-11 ENCOUNTER — ANESTHESIA EVENT (OUTPATIENT)
Dept: GASTROENTEROLOGY | Facility: AMBULARY SURGERY CENTER | Age: 64
End: 2024-12-11
Payer: COMMERCIAL

## 2024-12-11 RX ORDER — SODIUM CHLORIDE, SODIUM LACTATE, POTASSIUM CHLORIDE, CALCIUM CHLORIDE 600; 310; 30; 20 MG/100ML; MG/100ML; MG/100ML; MG/100ML
100 INJECTION, SOLUTION INTRAVENOUS CONTINUOUS
Status: CANCELLED | OUTPATIENT
Start: 2024-12-11

## 2024-12-12 ENCOUNTER — ANESTHESIA (OUTPATIENT)
Dept: GASTROENTEROLOGY | Facility: AMBULARY SURGERY CENTER | Age: 64
End: 2024-12-12
Payer: COMMERCIAL

## 2024-12-12 ENCOUNTER — HOSPITAL ENCOUNTER (OUTPATIENT)
Dept: GASTROENTEROLOGY | Facility: AMBULARY SURGERY CENTER | Age: 64
Setting detail: OUTPATIENT SURGERY
End: 2024-12-12
Attending: INTERNAL MEDICINE
Payer: COMMERCIAL

## 2024-12-12 VITALS
HEART RATE: 60 BPM | DIASTOLIC BLOOD PRESSURE: 70 MMHG | TEMPERATURE: 97.5 F | SYSTOLIC BLOOD PRESSURE: 123 MMHG | OXYGEN SATURATION: 98 % | RESPIRATION RATE: 18 BRPM

## 2024-12-12 DIAGNOSIS — K22.70 BARRETT'S ESOPHAGUS WITHOUT DYSPLASIA: ICD-10-CM

## 2024-12-12 PROCEDURE — 88305 TISSUE EXAM BY PATHOLOGIST: CPT | Performed by: STUDENT IN AN ORGANIZED HEALTH CARE EDUCATION/TRAINING PROGRAM

## 2024-12-12 PROCEDURE — 43251 EGD REMOVE LESION SNARE: CPT | Performed by: INTERNAL MEDICINE

## 2024-12-12 PROCEDURE — 43239 EGD BIOPSY SINGLE/MULTIPLE: CPT | Performed by: INTERNAL MEDICINE

## 2024-12-12 RX ORDER — SODIUM CHLORIDE, SODIUM LACTATE, POTASSIUM CHLORIDE, CALCIUM CHLORIDE 600; 310; 30; 20 MG/100ML; MG/100ML; MG/100ML; MG/100ML
100 INJECTION, SOLUTION INTRAVENOUS CONTINUOUS
Status: DISCONTINUED | OUTPATIENT
Start: 2024-12-12 | End: 2024-12-16 | Stop reason: HOSPADM

## 2024-12-12 RX ORDER — LIDOCAINE HYDROCHLORIDE 10 MG/ML
INJECTION, SOLUTION EPIDURAL; INFILTRATION; INTRACAUDAL; PERINEURAL AS NEEDED
Status: DISCONTINUED | OUTPATIENT
Start: 2024-12-12 | End: 2024-12-12

## 2024-12-12 RX ORDER — SODIUM CHLORIDE, SODIUM LACTATE, POTASSIUM CHLORIDE, CALCIUM CHLORIDE 600; 310; 30; 20 MG/100ML; MG/100ML; MG/100ML; MG/100ML
INJECTION, SOLUTION INTRAVENOUS CONTINUOUS PRN
Status: DISCONTINUED | OUTPATIENT
Start: 2024-12-12 | End: 2024-12-12

## 2024-12-12 RX ORDER — PROPOFOL 10 MG/ML
INJECTION, EMULSION INTRAVENOUS AS NEEDED
Status: DISCONTINUED | OUTPATIENT
Start: 2024-12-12 | End: 2024-12-12

## 2024-12-12 RX ADMIN — SODIUM CHLORIDE, SODIUM LACTATE, POTASSIUM CHLORIDE, AND CALCIUM CHLORIDE: .6; .31; .03; .02 INJECTION, SOLUTION INTRAVENOUS at 11:21

## 2024-12-12 RX ADMIN — PROPOFOL 50 MG: 10 INJECTION, EMULSION INTRAVENOUS at 11:31

## 2024-12-12 RX ADMIN — PROPOFOL 100 MG: 10 INJECTION, EMULSION INTRAVENOUS at 11:30

## 2024-12-12 RX ADMIN — SODIUM CHLORIDE, SODIUM LACTATE, POTASSIUM CHLORIDE, AND CALCIUM CHLORIDE 100 ML/HR: .6; .31; .03; .02 INJECTION, SOLUTION INTRAVENOUS at 10:56

## 2024-12-12 RX ADMIN — LIDOCAINE HYDROCHLORIDE 50 MG: 10 INJECTION, SOLUTION EPIDURAL; INFILTRATION; INTRACAUDAL; PERINEURAL at 11:30

## 2024-12-12 RX ADMIN — PROPOFOL 50 MG: 10 INJECTION, EMULSION INTRAVENOUS at 11:35

## 2024-12-12 RX ADMIN — PROPOFOL 50 MG: 10 INJECTION, EMULSION INTRAVENOUS at 11:33

## 2024-12-12 NOTE — ANESTHESIA PREPROCEDURE EVALUATION
Procedure:  EGD    Relevant Problems   CARDIO   (+) Mixed hyperlipidemia   (+) Primary hypertension   (+) Varicose veins of right lower extremity with pain      GI/HEPATIC   (+) Esophageal reflux   (+) Hernia, hiatal      MUSCULOSKELETAL   (+) Chronic low back pain   (+) Hernia, hiatal   (+) Lumbar spondylosis   (+) Primary osteoarthritis of right knee      NEURO/PSYCH   (+) Chronic low back pain   (+) Frequent headaches        Physical Exam    Airway    Mallampati score: II  TM Distance: >3 FB  Neck ROM: full     Dental   No notable dental hx     Cardiovascular      Pulmonary      Other Findings  post-pubertal.      Anesthesia Plan  ASA Score- 2     Anesthesia Type- IV sedation with anesthesia with ASA Monitors.         Additional Monitors:     Airway Plan:            Plan Factors-Exercise tolerance (METS): >4 METS.    Chart reviewed.    Patient summary reviewed.    Patient is not a current smoker.  Patient did not smoke on day of surgery.            Induction- intravenous.    Postoperative Plan-     Perioperative Resuscitation Plan - Level 1 - Full Code.       Informed Consent- Anesthetic plan and risks discussed with patient.  I personally reviewed this patient with the CRNA. Discussed and agreed on the Anesthesia Plan with the CRNA..
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
## 2024-12-12 NOTE — ANESTHESIA POSTPROCEDURE EVALUATION
Post-Op Assessment Note    CV Status:  Stable  Pain Score: 0    Pain management: adequate       Mental Status:  Awake   Hydration Status:  Stable   PONV Controlled:  None   Airway Patency:  Patent  Two or more mitigation strategies used for obstructive sleep apnea   Post Op Vitals Reviewed: Yes    No anethesia notable event occurred.    Staff: CRNA           Last Filed PACU Vitals:  Vitals Value Taken Time   Temp     Pulse 61 12/12/24 1145   BP 99/65 12/12/24 1145   Resp 18 12/12/24 1145   SpO2 99 % 12/12/24 1145       Modified Camille:  No data recorded

## 2024-12-16 PROCEDURE — 88305 TISSUE EXAM BY PATHOLOGIST: CPT | Performed by: STUDENT IN AN ORGANIZED HEALTH CARE EDUCATION/TRAINING PROGRAM

## 2025-01-08 ENCOUNTER — TELEPHONE (OUTPATIENT)
Age: 65
End: 2025-01-08

## 2025-01-08 NOTE — TELEPHONE ENCOUNTER
Patients GI provider:  OLIVIA Lora    Number to return call: [phone]    Reason for call: Pt calling requested some one to give her results from the food allergies tests she did. Please contact pt for further details.    Scheduled procedure/appointment date if applicable: Apt 2/6/25

## 2025-01-09 ENCOUNTER — HOSPITAL ENCOUNTER (OUTPATIENT)
Dept: SLEEP CENTER | Facility: CLINIC | Age: 65
Discharge: HOME | End: 2025-01-09
Payer: COMMERCIAL

## 2025-01-09 PROCEDURE — G0399 HOME SLEEP TEST/TYPE 3 PORTA: HCPCS

## 2025-01-09 NOTE — TELEPHONE ENCOUNTER
Called patient to see where she had the bloodwork done. Patient stated [facility]. I will call for results and have them reviewed..

## 2025-01-10 ENCOUNTER — RESULTS FOLLOW-UP (OUTPATIENT)
Dept: GASTROENTEROLOGY | Facility: CLINIC | Age: 65
End: 2025-01-10

## 2025-01-10 NOTE — PROGRESS NOTES
Home Sleep Study Documentation    HOME STUDY DEVICE: Noxturnal no                                           Shaina G3 yes      Pre-Sleep Home Study:    Set-up and instructions performed by: BG Collins    Technician performed demonstration for Patient: yes    Return demonstration performed by Patient: yes    Written instructions provided to Patient: yes    Patient signed consent form: yes        Post-Sleep Home Study:    Additional comments by Patient: pending    Home Sleep Study Failed:pending    Failure reason: pending    Reported or Detected: pending    Scored by: pending

## 2025-01-10 NOTE — RESULT ENCOUNTER NOTE
Please call the patient regarding results.  Esophageal biopsies again show Christianson's esophagus but no dysplasia or progression toward cancer.  Gastric polyp was hyperplastic which does carry low risk of progression to gastric cancer.  Repeat EGD in 3 years

## 2025-01-14 ENCOUNTER — RESULTS FOLLOW-UP (OUTPATIENT)
Dept: BARIATRICS | Facility: CLINIC | Age: 65
End: 2025-01-14

## 2025-01-14 PROBLEM — G47.33 OSA (OBSTRUCTIVE SLEEP APNEA): Status: ACTIVE | Noted: 2025-01-14

## 2025-01-14 PROCEDURE — 95806 SLEEP STUDY UNATT&RESP EFFT: CPT | Performed by: INTERNAL MEDICINE

## 2025-01-22 ENCOUNTER — TELEPHONE (OUTPATIENT)
Dept: SLEEP CENTER | Facility: CLINIC | Age: 65
End: 2025-01-22

## 2025-01-22 NOTE — TELEPHONE ENCOUNTER
Home sleep study resulted and showed mild sleep apnea with event related desaturations. VARINDER 5.8.   RECOMMENDATION:per Dr. Houston  Clinical correlation is required to determine necessity of treatment for mild obstructive sleep apnea.  Consultation with a sleep medicine specialist is recommended.     Call to patient reviewed results and recommendations.  Patient to follow up with Dr. Frazier who ordered study to discuss next steps.     Patient will call back for questions or concerns.

## 2025-02-03 ENCOUNTER — OFFICE VISIT (OUTPATIENT)
Dept: FAMILY MEDICINE CLINIC | Facility: CLINIC | Age: 65
End: 2025-02-03
Payer: COMMERCIAL

## 2025-02-03 VITALS
SYSTOLIC BLOOD PRESSURE: 128 MMHG | HEART RATE: 68 BPM | WEIGHT: 204 LBS | DIASTOLIC BLOOD PRESSURE: 72 MMHG | BODY MASS INDEX: 36.14 KG/M2 | OXYGEN SATURATION: 95 % | HEIGHT: 63 IN | RESPIRATION RATE: 16 BRPM | TEMPERATURE: 96.8 F

## 2025-02-03 DIAGNOSIS — R30.0 DYSURIA: ICD-10-CM

## 2025-02-03 DIAGNOSIS — R10.2 PELVIC PAIN: Primary | ICD-10-CM

## 2025-02-03 LAB
SL AMB  POCT GLUCOSE, UA: ABNORMAL
SL AMB LEUKOCYTE ESTERASE,UA: 25
SL AMB POCT BILIRUBIN,UA: ABNORMAL
SL AMB POCT BLOOD,UA: ABNORMAL
SL AMB POCT CLARITY,UA: ABNORMAL
SL AMB POCT COLOR,UA: ABNORMAL
SL AMB POCT KETONES,UA: ABNORMAL
SL AMB POCT NITRITE,UA: ABNORMAL
SL AMB POCT PH,UA: 5
SL AMB POCT SPECIFIC GRAVITY,UA: 1.01
SL AMB POCT URINE PROTEIN: ABNORMAL
SL AMB POCT UROBILINOGEN: ABNORMAL

## 2025-02-03 PROCEDURE — 81003 URINALYSIS AUTO W/O SCOPE: CPT | Performed by: FAMILY MEDICINE

## 2025-02-03 PROCEDURE — 99213 OFFICE O/P EST LOW 20 MIN: CPT | Performed by: FAMILY MEDICINE

## 2025-02-03 RX ORDER — LEVOFLOXACIN 500 MG/1
500 TABLET, FILM COATED ORAL EVERY 24 HOURS
Qty: 5 TABLET | Refills: 0 | Status: SHIPPED | OUTPATIENT
Start: 2025-02-03 | End: 2025-02-08

## 2025-02-03 NOTE — ASSESSMENT & PLAN NOTE
Urinalysis only shows 25 leukocyte esterase.  Urine will be sent for culture and sensitivity    -Will place patient on Levaquin 500 milligrams daily x 5 days to see if this makes improvement    -Patient does have history of pubic sling
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
## 2025-02-03 NOTE — ASSESSMENT & PLAN NOTE
Difficult to discern if this is neurologic versus musculoskeletal    -Will place patient on course of Levaquin 500 mg daily x 5 days    -It is possible that she could be having a pubic symphysis apophysitis but in the past she does not react well to corticosteroids.  She can try taking ibuprofen

## 2025-02-05 ENCOUNTER — TELEPHONE (OUTPATIENT)
Age: 65
End: 2025-02-05

## 2025-02-05 NOTE — TELEPHONE ENCOUNTER
Due to bad weather, pts  2/6/25 appt has to be rescheduled. Left a message for pt to return call and reschedule.
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
## 2025-02-10 ENCOUNTER — OFFICE VISIT (OUTPATIENT)
Dept: CARDIOLOGY CLINIC | Facility: CLINIC | Age: 65
End: 2025-02-10
Payer: COMMERCIAL

## 2025-02-10 ENCOUNTER — NURSE TRIAGE (OUTPATIENT)
Age: 65
End: 2025-02-10

## 2025-02-10 VITALS
DIASTOLIC BLOOD PRESSURE: 90 MMHG | SYSTOLIC BLOOD PRESSURE: 138 MMHG | WEIGHT: 204 LBS | HEART RATE: 60 BPM | BODY MASS INDEX: 36.14 KG/M2 | OXYGEN SATURATION: 99 %

## 2025-02-10 DIAGNOSIS — R30.0 DYSURIA: ICD-10-CM

## 2025-02-10 DIAGNOSIS — Z78.9 STATIN INTOLERANCE: ICD-10-CM

## 2025-02-10 DIAGNOSIS — E66.09 CLASS 2 OBESITY DUE TO EXCESS CALORIES WITHOUT SERIOUS COMORBIDITY WITH BODY MASS INDEX (BMI) OF 36.0 TO 36.9 IN ADULT: ICD-10-CM

## 2025-02-10 DIAGNOSIS — R10.2 PELVIC PAIN: Primary | ICD-10-CM

## 2025-02-10 DIAGNOSIS — E78.2 MIXED HYPERLIPIDEMIA: Primary | ICD-10-CM

## 2025-02-10 DIAGNOSIS — R07.2 PRECORDIAL PAIN: ICD-10-CM

## 2025-02-10 DIAGNOSIS — E66.812 CLASS 2 OBESITY DUE TO EXCESS CALORIES WITHOUT SERIOUS COMORBIDITY WITH BODY MASS INDEX (BMI) OF 36.0 TO 36.9 IN ADULT: ICD-10-CM

## 2025-02-10 PROCEDURE — 99214 OFFICE O/P EST MOD 30 MIN: CPT | Performed by: INTERNAL MEDICINE

## 2025-02-10 PROCEDURE — 93000 ELECTROCARDIOGRAM COMPLETE: CPT | Performed by: INTERNAL MEDICINE

## 2025-02-10 NOTE — TELEPHONE ENCOUNTER
No, urine culture showed no growth.  That was one of the reasons why I was fine with her discontinuing Levaquin after 3 days of treatment.  I would not send in another antibiotic.  Previously she was placed on Detrol LA by urology back in 2018.  That was 7 years ago.  Could try placing her on something like that or she did schedule a follow-up appointment with urology.  She would be considered a new patient because she has not seen neurology in quite some time

## 2025-02-10 NOTE — TELEPHONE ENCOUNTER
Placed order for Dr. Francis who is in [place].  Dr. Davis is no longer with [facility]

## 2025-02-10 NOTE — TELEPHONE ENCOUNTER
"Patient triaged off of [facility] message post OV 2/3/25 for possible UTI. Patient reports she noticed some possible urinary symptoms start again 2/9/25 including increased frequency, burning after urination, and dribbling. Patient attempted to take Levaquin (previously prescribed 2/3/25 for possible UTI but she stopped because was making her feel awful). Patient reports that after taking the dose of Levaquin her heart started racing and she felt lousy again. Will not take the levofloxacin (LEVAQUIN) 500 mg tablet anymore. Is wondering if PCP would consider calling in another prescription for her for possible UTI. She is concerned that the UTI symptoms she had 2/3 are coming back after not being on an antibiotic. Please reach out to patient with provider response.    Reason for Disposition   Urinating more frequently than usual (i.e., frequency) OR new-onset of the feeling of an urgent need to urinate (i.e., urgency)    Answer Assessment - Initial Assessment Questions  1. SYMPTOM: \"What's the main symptom you're concerned about?\" (e.g., frequency, incontinence)      Increased frequency  2. ONSET: \"When did the  symptoms  start?\"      2/9/25  3. PAIN: \"Is there any pain?\" If Yes, ask: \"How bad is it?\" (Scale: 1-10; mild, moderate, severe)      yes  4. CAUSE: \"What do you think is causing the symptoms?\"      ? UTI  5. OTHER SYMPTOMS: \"Do you have any other symptoms?\" (e.g., blood in urine, fever, flank pain, pain with urination)      Pain, burning after urinating, dribbling, tried to take the Levaquin again. Reports she had racing heart, felt badly,   6. PREGNANCY: \"Is there any chance you are pregnant?\" \"When was your last menstrual period?\"      NA    Protocols used: Urinary Symptoms-Adult-[place]    "

## 2025-02-10 NOTE — TELEPHONE ENCOUNTER
Consult Placed     Who: [facility]/ [facility]  Date:  Time:     Electronically signed by Dinah Johns on 8/22/2023 at 7:59 AM I spoke with Lennie and she is willing to see Urology.  Can you place the order for her?  She is not interested in starting any other medication right now like Detrol.   She is also scheduled with Gynecology as well.

## 2025-02-11 ENCOUNTER — RESULTS FOLLOW-UP (OUTPATIENT)
Dept: CARDIOLOGY CLINIC | Facility: CLINIC | Age: 65
End: 2025-02-11

## 2025-02-11 ENCOUNTER — TELEPHONE (OUTPATIENT)
Age: 65
End: 2025-02-11

## 2025-02-11 DIAGNOSIS — K22.70 BARRETT'S ESOPHAGUS WITHOUT DYSPLASIA: ICD-10-CM

## 2025-02-11 DIAGNOSIS — K21.9 GASTROESOPHAGEAL REFLUX DISEASE, UNSPECIFIED WHETHER ESOPHAGITIS PRESENT: ICD-10-CM

## 2025-02-11 LAB — CK SERPL-CCNC: 106 U/L (ref 32–182)

## 2025-02-11 NOTE — TELEPHONE ENCOUNTER
New Patient    Appointment Scheduling  What office location does the patient prefer?:  [place]    What is the reason for the patient's appointment?:  REF - Pelvic pain/Dysuria     Have patient records been requested?:  If No, are the records showing in Epic: in Epic    Appointment Details  Date: 2/17/25  Time:    8:20 AM  Location:   [place]  Provider:  Henrique  Does the appointment need further review? (Reason)      HISTORY  Is the patient having active symptoms? If so, describe symptoms:  - Pain in abdomen and pubic area  - hard time walking, pain with every step she takes  - Pulsating feeling in urethra    Has the patient had any previous Urologist(s)?:  Dr. Davis in 2018    Was the patient seen in the ED?:  No    Has the patient had any outside testing done?:  No    Does patient have Imaging/Lab Results:  No    Does the patient have a personal history of any cancer?:  No    INSURANCE   Have you confirmed Patient's insurance? yes  Is the insurance accepted?  yes  Is the insurance active? yes

## 2025-02-11 NOTE — RESULT ENCOUNTER NOTE
Patient informed of result.  Stated that she saw the result of 106 and was wondering if that was considered normal.

## 2025-02-12 RX ORDER — ESOMEPRAZOLE MAGNESIUM 40 MG/1
40 CAPSULE, DELAYED RELEASE ORAL
Qty: 60 CAPSULE | Refills: 5 | Status: SHIPPED | OUTPATIENT
Start: 2025-02-12 | End: 2025-03-14

## 2025-02-13 ENCOUNTER — HOSPITAL ENCOUNTER (OUTPATIENT)
Dept: RADIOLOGY | Facility: HOSPITAL | Age: 65
Discharge: HOME | End: 2025-02-13
Payer: COMMERCIAL

## 2025-02-13 DIAGNOSIS — Z12.31 ENCOUNTER FOR SCREENING MAMMOGRAM FOR BREAST CANCER: ICD-10-CM

## 2025-02-13 PROCEDURE — 77067 SCR MAMMO BI INCL CAD: CPT

## 2025-02-13 PROCEDURE — 77063 BREAST TOMOSYNTHESIS BI: CPT

## 2025-02-14 ENCOUNTER — RESULTS FOLLOW-UP (OUTPATIENT)
Dept: FAMILY MEDICINE CLINIC | Facility: CLINIC | Age: 65
End: 2025-02-14

## 2025-02-17 ENCOUNTER — OFFICE VISIT (OUTPATIENT)
Dept: UROLOGY | Facility: CLINIC | Age: 65
End: 2025-02-17
Payer: COMMERCIAL

## 2025-02-17 VITALS
HEART RATE: 65 BPM | SYSTOLIC BLOOD PRESSURE: 132 MMHG | BODY MASS INDEX: 36.14 KG/M2 | DIASTOLIC BLOOD PRESSURE: 80 MMHG | WEIGHT: 204 LBS | OXYGEN SATURATION: 96 % | HEIGHT: 63 IN

## 2025-02-17 DIAGNOSIS — I10 PRIMARY HYPERTENSION: ICD-10-CM

## 2025-02-17 DIAGNOSIS — R30.0 DYSURIA: Primary | ICD-10-CM

## 2025-02-17 DIAGNOSIS — R39.89 BLADDER PAIN: ICD-10-CM

## 2025-02-17 DIAGNOSIS — R10.2 PELVIC PAIN: ICD-10-CM

## 2025-02-17 LAB

## 2025-02-17 PROCEDURE — 99204 OFFICE O/P NEW MOD 45 MIN: CPT

## 2025-02-17 PROCEDURE — 81002 URINALYSIS NONAUTO W/O SCOPE: CPT

## 2025-02-17 PROCEDURE — 51798 US URINE CAPACITY MEASURE: CPT
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
## 2025-02-17 NOTE — PROGRESS NOTES
Name: Jacinta Moore      : 1960      MRN: [id number]  Encounter Provider: LAUREN Ford  Encounter Date: 2025   Encounter department: [facility] [place]  :  Assessment & Plan  Cervical cancer screening    Orders:    Liquid-based pap, screening    Pelvic pain  -Reviewed with patient to continue care with PCP, , and GI.  -TVUS ordered to r/o GYN etiology. Reviewed low suspicion for GYN etiology.  -Will review once TVUS is resulted.  -Counseled patient on pelvic floor and discussed MSK dysfunction as a possible etiology for pain; especially, since pain started with a precipitating incident.   -Advised to start pelvic floor PT for relief.  -Can utilize NSAIDS or tylenol for pain relief along with heat application.    Orders:    US pelvis complete w transvaginal; Future        History of Present Illness   HPI  Jacinta Moore is a 64 y.o. female who presents for abdominal pain.    She reports suprapubic pain x 4 weeks.  Pain started after she sat in a pub chair for extended time and felt discomfort and pulling when she got up.   She describes the pain as menstrual cramps.  She also reports 'urethral pinching' and pain from pubic bone to rectum. She reports coccyx pain.  She reports pain worsens with movement and walking. This is pain primarily in her abdomen, not back.   She reports trouble getting up to move.   +low back pain.   Pain varies throughout the day, but never goes away.   Pain at worst is 8/10. Pain typically is 3-4.   Pain is relieved with rest (moderate relief).     She reports history of IBS-C  She is unsure if her constipation is affecting her pain.  GI rescheduled from  to 3/17.    She saw her PCP for pain and referred to GI and .  She saw Urology on ;  she has US of kidney and bladder with cystoscopy scheduled.  She has history of urinary mesh/sling.  History of hernia  Sister has history of bladder cancer.  She is not a smoker. Quit at 30  y.o.     She has seen physical therapy in the past but reports it causes nausea.     She is not sexually active.  Postmenopausal; no vaginal bleeding or spotting.  Denies vaginal discharge, itching, irritation.  She reports a burning sensation (occasional).   Denies GYN history or cancers. Denies GYN family cancer.  She reports cerclage with pregnancies.      History obtained from: patient    Review of Systems  Medical History Reviewed by provider this encounter:     .  Current Outpatient Medications on File Prior to Visit   Medication Sig Dispense Refill    Co Q-10 50 MG Take 50 mg by mouth daily after breakfast      dicyclomine (BENTYL) 10 mg capsule Take 1 capsule (10 mg total) by mouth 4 (four) times a day (before meals and at bedtime) 120 capsule 1    ergocalciferol (VITAMIN D2) 50,000 units Take 1 capsule (50,000 Units total) by mouth once a week 12 capsule 3    esomeprazole (NexIUM) 40 MG capsule Take 1 capsule (40 mg total) by mouth 2 (two) times a day before meals Take 1/2 hour for breakfast and 1/2 hour before dinner 60 capsule 5    Ivermectin 1 % CREA       metoprolol succinate (TOPROL-XL) 25 mg 24 hr tablet TAKE ONE TABLET BY MOUTH EVERY  tablet 1    Omega-3 Fatty Acids (fish oil) 1,000 mg Take 2 capsules (2,000 mg total) by mouth 2 (two) times a day 180 capsule 4    rosuvastatin (CRESTOR) 10 MG tablet Take 1 tablet (10 mg total) by mouth daily 90 tablet 2    DULoxetine (CYMBALTA) 20 mg capsule Take 1 capsule (20 mg total) by mouth daily (Patient not taking: Reported on 2/10/2025)      tiZANidine (ZANAFLEX) 2 mg tablet Take 1 tablet (2 mg total) by mouth 2 (two) times a day as needed for muscle spasms 60 tablet 0    [DISCONTINUED] metoprolol succinate (TOPROL-XL) 25 mg 24 hr tablet TAKE ONE TABLET BY MOUTH EVERY  tablet 1     No current facility-administered medications on file prior to visit.      Social History     Tobacco Use    Smoking status: Former     Current packs/day: 0.00      "Types: Cigarettes     Quit date:      Years since quittin.1     Passive exposure: Past    Smokeless tobacco: Never    Tobacco comments:     Quit at age 31   Vaping Use    Vaping status: Never Used   Substance and Sexual Activity    Alcohol use: Yes     Comment: socially    Drug use: No    Sexual activity: Yes     Partners: Male     Birth control/protection: Female Sterilization, Post-menopausal        Objective   /86 (BP Location: Left arm, Patient Position: Sitting, Cuff Size: Standard)   Ht 5' 3\" (1.6 m)   Wt 92.5 kg (204 lb)   LMP 2016   BMI 36.14 kg/m²      Physical Exam  Vitals and nursing note reviewed.   Constitutional:       General: She is not in acute distress.     Appearance: Normal appearance.   HENT:      Head: Normocephalic.   Eyes:      Conjunctiva/sclera: Conjunctivae normal.   Pulmonary:      Effort: No respiratory distress.   Abdominal:      General: Abdomen is flat.      Palpations: Abdomen is soft.   Genitourinary:     General: Normal vulva.      Exam position: Lithotomy position.      Pubic Area: No rash or pubic lice.       Labia:         Right: No rash or tenderness.         Left: No rash or tenderness.       Urethra: No urethral pain.      Vagina: Normal.      Cervix: Normal.      Uterus: Normal.       Adnexa: Right adnexa normal and left adnexa normal.   Musculoskeletal:         General: Normal range of motion.      Cervical back: Normal range of motion.      Right lower leg: No edema.      Left lower leg: No edema.   Lymphadenopathy:      Lower Body: No right inguinal adenopathy. No left inguinal adenopathy.   Skin:     General: Skin is warm and dry.   Neurological:      Mental Status: She is alert and oriented to person, place, and time.   Psychiatric:         Mood and Affect: Mood normal.         Behavior: Behavior normal.         Thought Content: Thought content normal.         Judgment: Judgment normal.           "

## 2025-02-18 ENCOUNTER — OFFICE VISIT (OUTPATIENT)
Dept: OBGYN CLINIC | Facility: CLINIC | Age: 65
End: 2025-02-18
Payer: COMMERCIAL

## 2025-02-18 ENCOUNTER — HOSPITAL ENCOUNTER (OUTPATIENT)
Dept: RADIOLOGY | Facility: HOSPITAL | Age: 65
Discharge: HOME | End: 2025-02-18
Payer: COMMERCIAL

## 2025-02-18 VITALS
DIASTOLIC BLOOD PRESSURE: 86 MMHG | SYSTOLIC BLOOD PRESSURE: 120 MMHG | HEIGHT: 63 IN | BODY MASS INDEX: 36.14 KG/M2 | WEIGHT: 204 LBS

## 2025-02-18 DIAGNOSIS — R10.2 PELVIC PAIN: ICD-10-CM

## 2025-02-18 DIAGNOSIS — Z12.4 CERVICAL CANCER SCREENING: Primary | ICD-10-CM

## 2025-02-18 DIAGNOSIS — R39.89 BLADDER PAIN: ICD-10-CM

## 2025-02-18 PROCEDURE — G0476 HPV COMBO ASSAY CA SCREEN: HCPCS

## 2025-02-18 PROCEDURE — G0145 SCR C/V CYTO,THINLAYER,RESCR: HCPCS

## 2025-02-18 PROCEDURE — 99203 OFFICE O/P NEW LOW 30 MIN: CPT

## 2025-02-18 PROCEDURE — 76775 US EXAM ABDO BACK WALL LIM: CPT

## 2025-02-18 RX ORDER — METOPROLOL SUCCINATE 25 MG/1
25 TABLET, EXTENDED RELEASE ORAL DAILY
Qty: 100 TABLET | Refills: 1 | Status: SHIPPED | OUTPATIENT
Start: 2025-02-18

## 2025-02-18 NOTE — ASSESSMENT & PLAN NOTE
-Reviewed with patient to continue care with PCP, , and GI.  -TVUS ordered to r/o GYN etiology. Reviewed low suspicion for GYN etiology.  -Will review once TVUS is resulted.  -Counseled patient on pelvic floor and discussed MSK dysfunction as a possible etiology for pain; especially, since pain started with a precipitating incident.   -Advised to start pelvic floor PT for relief.  -Can utilize NSAIDS or tylenol for pain relief along with heat application.    Orders:    US pelvis complete w transvaginal; Future

## 2025-02-19 LAB
HPV HR 12 DNA CVX QL NAA+PROBE: NEGATIVE
HPV16 DNA CVX QL NAA+PROBE: NEGATIVE
HPV18 DNA CVX QL NAA+PROBE: NEGATIVE

## 2025-02-20 ENCOUNTER — PROCEDURE VISIT (OUTPATIENT)
Dept: UROLOGY | Facility: CLINIC | Age: 65
End: 2025-02-20
Payer: COMMERCIAL

## 2025-02-20 VITALS
HEIGHT: 63 IN | DIASTOLIC BLOOD PRESSURE: 80 MMHG | OXYGEN SATURATION: 96 % | SYSTOLIC BLOOD PRESSURE: 126 MMHG | BODY MASS INDEX: 36.14 KG/M2 | WEIGHT: 204 LBS | HEART RATE: 67 BPM

## 2025-02-20 DIAGNOSIS — R39.89 BLADDER PAIN: ICD-10-CM

## 2025-02-20 DIAGNOSIS — R31.29 MICROSCOPIC HEMATURIA: Primary | ICD-10-CM

## 2025-02-20 PROCEDURE — 52000 CYSTOURETHROSCOPY: CPT | Performed by: UROLOGY

## 2025-02-20 NOTE — PATIENT INSTRUCTIONS
Dr. John Ahuja [facility] might be somebody to visit if pelvic floor is suspected.  Try the Motrin as discussed 600 mg three times a day with food for up to 3 days to see if helps with the pain.

## 2025-02-20 NOTE — PROGRESS NOTES
Cystoscopy     Date/Time  2/20/2025 1:00 PM     Performed by  Justino Francis MD   Authorized by  Justino Francis MD     Universal Protocol:  Consent: Written consent obtained.  Risks and benefits: risks, benefits and alternatives were discussed  Consent given by: patient  Patient understanding: patient states understanding of the procedure being performed  Patient consent: the patient's understanding of the procedure matches consent given  Procedure consent: procedure consent matches procedure scheduled  Patient identity confirmed: verbally with patient      Procedure Details:  Procedure type: cystoscopy    Patient tolerance: Patient tolerated the procedure well with no immediate complications    Additional Procedure Details: Indication:  Patient is a 64-year-old female who presents to the office today for evaluation of 4 weeks of pelvic pain.  She has a previous history of mesh placement.  She states that there is no inciting event.  She notes suprapubic discomfort, pain at the pubis bone and then spreading to her rectum as well as vaginal discomfort.  No specific provoking or palliative factor.  She states that the pain can be significant enough where she feels like she has trouble walking but then this dissipates.  She states that she is constipated but this has been a longstanding concern.  History of radiculopathy that she states is getting worse.  Denies urinary retention or urinary incontinence.  No gross hematuria.  Sister with history of bladder cancer but did have a smoking history.  Patient quit smoking at the age of 30 but has a 30-year pack history having smoked for 15 years but up to 2 packs a day.    Findings:  No urethral stricture.  No urethral lesions.  Bladder was clean.  Some bluish abnormalities maybe 3 to 4 mm in diameter in several areas of the bladder.  No inflammation.  Where patient is tender suprapubically there is nothing visible in the bladder.  Nothing that looks urachal in  origin.  No exposed to urethral mesh.  She says she had a pubovaginal sling in 2014.  No issues since then.    Impression and plan:  Suspect that this is something musculoskeletal as this occurred after she sat on a chair and played musical instrument for a while and then this hurt in that area when she got up.  This was about 6 weeks ago.  She has not really tried anti-inflammatories.  She takes Tylenol.  I advised that she try some Motrin 600 mg 3 times a day for couple of days with food to avoid stomach upset.  No need for antibiotics.  No need for pelvic floor physical therapy.  Might benefit from a visit to Dr. John Ahuja at [facility].  He specializes in pelvic floor disorders.  She should also follow-up with her GI doctor 3/17/2025 to see if this is some odd form of IBS.

## 2025-02-24 ENCOUNTER — RESULTS FOLLOW-UP (OUTPATIENT)
Dept: OBGYN CLINIC | Facility: CLINIC | Age: 65
End: 2025-02-24

## 2025-02-24 LAB
LAB AP GYN PRIMARY INTERPRETATION: NORMAL
Lab: NORMAL

## 2025-02-28 ENCOUNTER — RESULTS FOLLOW-UP (OUTPATIENT)
Dept: UROLOGY | Facility: CLINIC | Age: 65
End: 2025-02-28

## 2025-02-28 DIAGNOSIS — D17.9 ANGIOMYOLIPOMA: Primary | ICD-10-CM

## 2025-02-28 NOTE — TELEPHONE ENCOUNTER
L/M for pt to call back to inform her of Henrique Stevens result note     ----- Message from Henrique Stevens PA-C sent at 2/28/2025  8:42 AM EST -----  Please call patient to inform her that her ultrasound does not reveal any bladder abnormalities.  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  This is a benign fatty infiltrate.  To get definitive characterization of this area I did put an order for a CT scan that patient can obtain in 6 months.  She can follow-up after obtainment of this imaging at that point in time

## 2025-03-03 NOTE — TELEPHONE ENCOUNTER
Spoke to PT she is not comfortable with waiting 6 months to obtain CT would call to have it scheduled sooner was advised that we would give her a call with results as well as she would schedule FU APT.             Left 2nd message for PT informing of Henrique's Message in regards to results from her US.       ----- Message from Henrique Stevens PA-C sent at 2/28/2025  8:42 AM EST -----  Please call patient to inform her that her ultrasound does not reveal any bladder abnormalities.  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  This is a benign fatty infiltrate.  To get definitive characterization of this area I did put an order for a CT scan that patient can obtain in 6 months.  She can follow-up after obtainment of this imaging at that point in time   General

## 2025-03-04 NOTE — TELEPHONE ENCOUNTER
Patient calling in and made aware of park result note and recommendations. She would like a call back. She is concerned about waiting 6 months for CT.       CB: [phone]

## 2025-03-06 NOTE — TELEPHONE ENCOUNTER
[facility] Scan [facility] called to ask that a CMP be ordered for the patient ahead of 3/12 CT Scan.    In addition it was requested that the office contact the patient and have the patient get blood work drawn prior to imaging appt.     Call back [phone]

## 2025-03-06 NOTE — TELEPHONE ENCOUNTER
I ordered the CMP. I called the patient and left a detailed voicemail that she is to complete this prior to her CT scan.

## 2025-03-07 LAB
ALBUMIN SERPL-MCNC: 4.4 G/DL (ref 3.9–4.9)
ALP SERPL-CCNC: 90 IU/L (ref 44–121)
ALT SERPL-CCNC: 21 IU/L (ref 0–32)
AST SERPL-CCNC: 16 IU/L (ref 0–40)
BILIRUB SERPL-MCNC: 0.7 MG/DL (ref 0–1.2)
BUN SERPL-MCNC: 14 MG/DL (ref 8–27)
BUN/CREAT SERPL: 20 (ref 12–28)
CALCIUM SERPL-MCNC: 9.6 MG/DL (ref 8.7–10.3)
CHLORIDE SERPL-SCNC: 106 MMOL/L (ref 96–106)
CO2 SERPL-SCNC: 24 MMOL/L (ref 20–29)
CREAT SERPL-MCNC: 0.71 MG/DL (ref 0.57–1)
EGFR: 95 ML/MIN/1.73
GLOBULIN SER-MCNC: 2.6 G/DL (ref 1.5–4.5)
GLUCOSE SERPL-MCNC: 90 MG/DL (ref 70–99)
POTASSIUM SERPL-SCNC: 4 MMOL/L (ref 3.5–5.2)
PROT SERPL-MCNC: 7 G/DL (ref 6–8.5)
SODIUM SERPL-SCNC: 143 MMOL/L (ref 134–144)

## 2025-03-07 NOTE — TELEPHONE ENCOUNTER
Patient will be stopping in around 10 this morning to  her CMP lab order due to having to go to [facility].

## 2025-03-10 ENCOUNTER — TELEPHONE (OUTPATIENT)
Dept: UROLOGY | Facility: CLINIC | Age: 65
End: 2025-03-10

## 2025-03-12 ENCOUNTER — HOSPITAL ENCOUNTER (OUTPATIENT)
Dept: RADIOLOGY | Facility: HOSPITAL | Age: 65
Discharge: HOME | End: 2025-03-12
Payer: COMMERCIAL

## 2025-03-12 DIAGNOSIS — R10.2 PELVIC PAIN: ICD-10-CM

## 2025-03-12 DIAGNOSIS — D17.9 ANGIOMYOLIPOMA: ICD-10-CM

## 2025-03-12 PROCEDURE — 74170 CT ABD WO CNTRST FLWD CNTRST: CPT

## 2025-03-12 PROCEDURE — 76856 US EXAM PELVIC COMPLETE: CPT

## 2025-03-12 PROCEDURE — 76830 TRANSVAGINAL US NON-OB: CPT

## 2025-03-12 RX ADMIN — IOHEXOL 100 ML: 350 INJECTION, SOLUTION INTRAVENOUS at 14:00

## 2025-03-17 ENCOUNTER — OFFICE VISIT (OUTPATIENT)
Age: 65
End: 2025-03-17
Payer: COMMERCIAL

## 2025-03-17 VITALS
HEART RATE: 82 BPM | SYSTOLIC BLOOD PRESSURE: 120 MMHG | BODY MASS INDEX: 36.32 KG/M2 | WEIGHT: 205 LBS | HEIGHT: 63 IN | DIASTOLIC BLOOD PRESSURE: 90 MMHG

## 2025-03-17 DIAGNOSIS — K22.70 BARRETT'S ESOPHAGUS WITHOUT DYSPLASIA: ICD-10-CM

## 2025-03-17 DIAGNOSIS — K31.7 HYPERPLASTIC POLYP OF STOMACH: ICD-10-CM

## 2025-03-17 DIAGNOSIS — K58.2 IRRITABLE BOWEL SYNDROME WITH BOTH CONSTIPATION AND DIARRHEA: Primary | ICD-10-CM

## 2025-03-17 DIAGNOSIS — Z86.0101 HX OF ADENOMATOUS COLONIC POLYPS: ICD-10-CM

## 2025-03-17 DIAGNOSIS — K44.9 HERNIA, HIATAL: ICD-10-CM

## 2025-03-17 DIAGNOSIS — K21.9 GASTROESOPHAGEAL REFLUX DISEASE, UNSPECIFIED WHETHER ESOPHAGITIS PRESENT: ICD-10-CM

## 2025-03-17 PROCEDURE — 99213 OFFICE O/P EST LOW 20 MIN: CPT | Performed by: INTERNAL MEDICINE

## 2025-03-17 NOTE — ASSESSMENT & PLAN NOTE
Has had some increased constipation recently, as well as lower abdominal pain, though these seem to be temporally unrelated and not necessarily similar to her prior IBS pain.  Undergoing evaluation with Gyn and nephrology.  If this is unrevealing and symptoms persist, might consider further evaluation with colonoscopy

## 2025-03-17 NOTE — PROGRESS NOTES
Name: Jacinta Moore      : 1960      MRN: [id number]  Encounter Provider: Yimi Martins MD  Encounter Date: 3/17/2025   Encounter department: [facility]  :  Assessment & Plan  Irritable bowel syndrome with both constipation and diarrhea  Has had some increased constipation recently, as well as lower abdominal pain, though these seem to be temporally unrelated and not necessarily similar to her prior IBS pain.  Undergoing evaluation with Gyn and nephrology.  If this is unrevealing and symptoms persist, might consider further evaluation with colonoscopy       Gastroesophageal reflux disease, unspecified whether esophagitis present  Symptoms controlled with esomeprazole 40 mg daily       Christianson's esophagus without dysplasia  C0M2. No evidence of dysplasia.  Will repeat surveillance in 3 years.  Continue esomeprazole       Hernia, hiatal  Discussed option of hiatal hernia repair with fundoplication       Hx of adenomatous colonic polyps  Repeat surveillance colonoscopy 2026       Hyperplastic polyp of stomach  Will reevaluate on repeat EGD in 3 years           History of Present Illness   Jacinta Moore is a 64 y.o. female who presents in follow-up of GERD, Christianson's.  She is been feeling reasonably well although has had some lower abdominal discomfort and increased constipation in the past few months.  Has undergone evaluation including abdominal CT, pelvic ultrasound.  Does have underlying mixed IBS.  No blood in her stool, unexplained weight loss, notice, rash, bruising or bleeding  HPI    Review of Systems A complete review of systems is negative other than that noted above in the HPI.      Current Outpatient Medications   Medication Sig Dispense Refill    Co Q-10 50 MG Take 50 mg by mouth daily after breakfast      dicyclomine (BENTYL) 10 mg capsule Take 1 capsule (10 mg total) by mouth 4 (four) times a day (before meals and at bedtime) 120 capsule 1  "   ergocalciferol (VITAMIN D2) 50,000 units Take 1 capsule (50,000 Units total) by mouth once a week 12 capsule 3    esomeprazole (NexIUM) 40 MG capsule Take 1 capsule (40 mg total) by mouth 2 (two) times a day before meals Take 1/2 hour for breakfast and 1/2 hour before dinner 60 capsule 5    Ivermectin 1 % CREA       metoprolol succinate (TOPROL-XL) 25 mg 24 hr tablet TAKE ONE TABLET BY MOUTH EVERY  tablet 1    Omega-3 Fatty Acids (fish oil) 1,000 mg Take 2 capsules (2,000 mg total) by mouth 2 (two) times a day 180 capsule 4    rosuvastatin (CRESTOR) 10 MG tablet Take 1 tablet (10 mg total) by mouth daily 90 tablet 2    DULoxetine (CYMBALTA) 20 mg capsule Take 1 capsule (20 mg total) by mouth daily (Patient not taking: Reported on 2/10/2025)      tiZANidine (ZANAFLEX) 2 mg tablet Take 1 tablet (2 mg total) by mouth 2 (two) times a day as needed for muscle spasms (Patient not taking: Reported on 3/17/2025) 60 tablet 0     No current facility-administered medications for this visit.     Objective   /90 (BP Location: Left arm, Patient Position: Sitting, Cuff Size: Standard)   Pulse 82   Ht 5' 3\" (1.6 m)   Wt 93 kg (205 lb)   LMP 01/01/2016   BMI 36.31 kg/m²     Physical Exam  Vitals and nursing note reviewed.   Constitutional:       General: She is not in acute distress.     Appearance: She is not ill-appearing.   HENT:      Head: Normocephalic and atraumatic.   Eyes:      General: No scleral icterus.     Extraocular Movements: Extraocular movements intact.   Cardiovascular:      Rate and Rhythm: Normal rate and regular rhythm.   Pulmonary:      Effort: Pulmonary effort is normal. No respiratory distress.   Abdominal:      General: There is no distension.      Palpations: Abdomen is soft.      Tenderness: There is abdominal tenderness. There is no guarding or rebound.      Comments: Mild tenderness to deep palpation in the left upper quadrant and right lower quadrant   Musculoskeletal:      Right " lower leg: No edema.      Left lower leg: No edema.   Skin:     General: Skin is warm and dry.      Coloration: Skin is not cyanotic.      Findings: No erythema.   Neurological:      General: No focal deficit present.      Mental Status: She is alert and oriented to person, place, and time.   Psychiatric:         Mood and Affect: Mood normal.         Behavior: Behavior normal.            Lab Results: I personally reviewed relevant lab results.       Results for orders placed during the hospital encounter of 12/12/24    EGD    Impression  C0M2 Christianson's esophagus; electronic chromoendoscopy (NBI) was used  Performed forceps biopsies in the esophagus  Polyp in the fundus of the stomach; performed cold snare  2 cm hiatal hernia  Mild erythematous mucosa in the antrum; performed cold forceps biopsy  The duodenum appeared normal.      RECOMMENDATION:  Await pathology results  Schedule follow-up procedure for continued treatment  Christianson's esophagus surveillance    Repeat EGD in 3 years  Continue Nexium            Yimi Martins MD

## 2025-03-19 ENCOUNTER — RESULTS FOLLOW-UP (OUTPATIENT)
Dept: UROLOGY | Facility: AMBULATORY SURGERY CENTER | Age: 65
End: 2025-03-19

## 2025-04-23 ENCOUNTER — OFFICE VISIT (OUTPATIENT)
Dept: BARIATRICS | Facility: CLINIC | Age: 65
End: 2025-04-23
Payer: COMMERCIAL

## 2025-04-23 VITALS
BODY MASS INDEX: 35.77 KG/M2 | HEART RATE: 78 BPM | SYSTOLIC BLOOD PRESSURE: 118 MMHG | HEIGHT: 63 IN | DIASTOLIC BLOOD PRESSURE: 88 MMHG | WEIGHT: 201.9 LBS

## 2025-04-23 DIAGNOSIS — E66.01 CLASS 2 SEVERE OBESITY DUE TO EXCESS CALORIES WITH SERIOUS COMORBIDITY AND BODY MASS INDEX (BMI) OF 35.0 TO 35.9 IN ADULT (HCC): Primary | ICD-10-CM

## 2025-04-23 DIAGNOSIS — E66.812 CLASS 2 SEVERE OBESITY DUE TO EXCESS CALORIES WITH SERIOUS COMORBIDITY AND BODY MASS INDEX (BMI) OF 35.0 TO 35.9 IN ADULT (HCC): Primary | ICD-10-CM

## 2025-04-23 PROCEDURE — 99215 OFFICE O/P EST HI 40 MIN: CPT | Performed by: INTERNAL MEDICINE

## 2025-04-23 NOTE — PROGRESS NOTES
"  Program: [facility]    Assessment/Plan     Jacinta Moore  is a 64 y.o. female with  returns to follow up  for treatment of excess body weight and associated risk factors/co-morbidities.     Class 2 severe obesity due to excess calories with serious comorbidity and body mass index (BMI) of 35.0 to 35.9 in adult (HCC)  Patient was last seen in the office in June 2024.   at that time she was hesitant to initiate antiobesity medications.  She finished the classes with feliciano Jimenez and attended the [employer] fitness program.  She reports she lost 14 pounds over the past year but regained it all back.  She attributes this to emotional/\"binge eating\"    Antiobesity Medications/Medical --  Patient patient obtained a prescription for Cymbalta which she is considering initiating for nerve pain.  We discussed that Cymbalta is an SNRI and Wellbutrin is a DNR I  Can consider low doses of Wellbutrin and naltrexone and monitor for any adrenergic effects  Patient will transition to Medicare in August-therefore she does not want to initiate any injectable medications at this time although she thinks Wegovy may be covered under her current  insurance.  Patient does not have history of MI/CVA, PAD  Patient has a recent sleep study that showed mild sleep apnea so she may not qualify for Zepbound  -Metoprolol itself has a weight promoting property, requested with patient to discuss with PCP about switching you to a different agent; metoprolol typically has to be weaned and cannot be stopped abruptly   Patient has no contraindication to Topamax Wellbutrin naltrexone and metformin  Would avoid phentermine given underlying anxiety      Nutrition:     Nutrition Prescription  Calories: 1,100-1,300 kcal  Protein: 63-78 g  Fluid: 61+ ounces  Has completed the healthy core program and may consider transitioning to the healthy ways for accountability       Physical Activity:   Patient states she suffers from nerve pain in her " "back that has limited her physical activity  Was going to [employer] and may resume fitness program    Sleep: -  Consider home sleep study as STOP-BANG score is 5/8   Mild FAYE  3/24    Food Behaviors/Stress/pyschosocial:    Emotional eating and \" binge eating\"  Patient had met with the  and was provided resources for various counselors.  She has however not made a follow-up appointment.  Encouraged her to schedule an appointment with a counselor to help her with coping strategies surrounding her emotional triggers to eating-offered visit for the  which she declined at this time            Most recent notes and records were reviewed.         Return visit:  2-3 months     Nutrition   Do not skip meals. Avoid sugary beverages. At least 64oz of water daily.    Behavioral/Stress   Food log via nayana or provided paper log (nayana options include www.Theravasc.com, sparkpeople.com, loseit.com, calorieking.com, [employer]). Encouraged mindful eating    Physical Activity  Increase physical activity by 10 minutes daily. Gradually increase physical activity to a goal of 5 days per week for 30 minutes of MODERATE intensity ( ( should be able to pass the \"talk test\" but should not be able to sing.  target 150-300 minutes  PLUS 2-3 days per week of FULL BODY resistance training. Progression will be addressed at follow up visits. Encouraged planning regarding establishing physical activity routine    Sleep  Provided sleep hygiene counseling and importance of having adequate sleep duration          Lennie was seen today for follow-up.    Diagnoses and all orders for this visit:    Class 2 severe obesity due to excess calories with serious comorbidity and body mass index (BMI) of 35.0 to 35.9 in adult (HCC)                  Total time spent reviewing chart, interviewing patient, examining patient, discussing plan, answering all questions, and documentin minutes with >50% face-to-face time with the " patient.            Weight trajectory     Wt Readings from Last 20 Encounters:   04/23/25 91.6 kg (201 lb 14.4 oz)   03/17/25 93 kg (205 lb)   02/20/25 92.5 kg (204 lb)   02/18/25 92.5 kg (204 lb)   02/17/25 92.5 kg (204 lb)   02/10/25 92.5 kg (204 lb)   02/03/25 92.5 kg (204 lb)   10/28/24 90.2 kg (198 lb 12.8 oz)   10/22/24 89.8 kg (198 lb)   09/10/24 88.9 kg (196 lb)   08/30/24 88.9 kg (196 lb)   08/12/24 88 kg (194 lb)   06/13/24 86.7 kg (191 lb 3.2 oz)   05/13/24 87 kg (191 lb 11.2 oz)   05/13/24 87 kg (191 lb 11.2 oz)   05/07/24 87.2 kg (192 lb 3.2 oz)   04/30/24 86.8 kg (191 lb 4.8 oz)   04/23/24 86.5 kg (190 lb 9.6 oz)   04/22/24 87.5 kg (193 lb)   04/16/24 86.5 kg (190 lb 12.8 oz)               Weight/ Lifestyle history/HPI         Nutrition Prescription  Calories: 1,200-1,400 kcal  Protein: 63-78 g  Fluid: 61+ ounces     Dietary Recall  Breakfast (9-10 am): 1/4 cup oatmeal w/ 2 eggs  Snack: -- Or 1/2 apple Or protein bar  Lunch (1:30 pm): sandwich (tuna or deli meat or egg salad and has been adding vegetables) Or leftovers Or bistro salad  Snack (5 pm): 1/2 scoop pure protein powder w/ 1 cup almond milk Or pure protein bar  Dinner (7-8 pm): protein/vegetable/carb Or steak/sausage/chicken/fish  Snack: pretzel thins Or chips (having 1 serving + a little extra)     Beverages: water, no caffeine (sensitive), alcohol (1x/week)  Volume of beverage intake: 32-48 oz water     Weekends: Same (retired)   Cravings: carbs/sweets   Trouble area of day: evenings and grazing throughout the day      Frequency of Eating out: 1x/week (not often)     Smoking: no  Drug use: no    Physical Activity --stopped exercising wants to start walking with the friend and     Sleep -- STOP- BANG- 5/8; doesn't want to do the mask so did not schedule the sleep study    Occupation-retired     Psycho social-lives with  and son    Gyneac (Menopausal status/periods/contraception)-  Weight/BMI at start of program on 01/18/24 : 204 lbs    "weight on 03/18/24 : 196 lbs(-9)  Difference: -9 lbs  Target weight : 150 lbs    Current Weight on 6/13/2024: 191(-5 lbs)  Current BMI : 34 kg/m2 30.0-34.9- Obesity Class I  Difference: -16 lbs      Anti obesity Medications/ Medical---    Weight loss medication and dose: none  Date initiated:               Objective         The following portions of the patient's history were reviewed and updated as appropriate: allergies, current medications, past family history, past medical history, past social history, past surgical history, and problem list.      /88 (Patient Position: Sitting, Cuff Size: Large)   Pulse 78   Ht 5' 3\" (1.6 m)   Wt 91.6 kg (201 lb 14.4 oz)   LMP 01/01/2016   BMI 35.76 kg/m²             Review of Systems   Constitutional:  Negative for fatigue.   HENT:  Negative for sore throat.    Respiratory:  Negative for cough and shortness of breath.    Cardiovascular:  Negative for chest pain, palpitations and leg swelling.   Gastrointestinal:  Negative for abdominal pain, constipation, diarrhea and nausea.   Genitourinary:  Negative for dysuria.   Musculoskeletal:  Negative for arthralgias and back pain.   Skin:  Negative for rash.   Neurological:  Negative for headaches.   Psychiatric/Behavioral:  Negative for dysphoric mood. The patient is not nervous/anxious.          Physical Exam  Vitals and nursing note reviewed.   Constitutional:       Appearance: Normal appearance.   HENT:      Head: Normocephalic.   Pulmonary:      Effort: Pulmonary effort is normal.   Neurological:      General: No focal deficit present.      Mental Status: She is alert and oriented to person, place, and time.   Psychiatric:         Mood and Affect: Mood normal.         Behavior: Behavior normal.         Thought Content: Thought content normal.         Judgment: Judgment normal.          Current medications       Current Outpatient Medications:     Co Q-10 50 MG, Take 50 mg by mouth daily after breakfast, Disp: , Rfl: "     ergocalciferol (VITAMIN D2) 50,000 units, Take 1 capsule (50,000 Units total) by mouth once a week, Disp: 12 capsule, Rfl: 3    esomeprazole (NexIUM) 40 MG capsule, Take 1 capsule (40 mg total) by mouth 2 (two) times a day before meals Take 1/2 hour for breakfast and 1/2 hour before dinner, Disp: 60 capsule, Rfl: 5    Ivermectin 1 % CREA, , Disp: , Rfl:     metoprolol succinate (TOPROL-XL) 25 mg 24 hr tablet, TAKE ONE TABLET BY MOUTH EVERY DAY, Disp: 100 tablet, Rfl: 1    Omega-3 Fatty Acids (fish oil) 1,000 mg, Take 2 capsules (2,000 mg total) by mouth 2 (two) times a day, Disp: 180 capsule, Rfl: 4    rosuvastatin (CRESTOR) 10 MG tablet, Take 1 tablet (10 mg total) by mouth daily, Disp: 90 tablet, Rfl: 2    dicyclomine (BENTYL) 10 mg capsule, Take 1 capsule (10 mg total) by mouth 4 (four) times a day (before meals and at bedtime) (Patient not taking: Reported on 4/23/2025), Disp: 120 capsule, Rfl: 1    DULoxetine (CYMBALTA) 20 mg capsule, Take 1 capsule (20 mg total) by mouth daily (Patient not taking: Reported on 2/10/2025), Disp: , Rfl:     tiZANidine (ZANAFLEX) 2 mg tablet, Take 1 tablet (2 mg total) by mouth 2 (two) times a day as needed for muscle spasms (Patient not taking: Reported on 3/17/2025), Disp: 60 tablet, Rfl: 0         Labs     Most recent labs reviewed   Lab Results   Component Value Date     10/28/2017    SODIUM 143 03/07/2025    K 4.0 03/07/2025     03/07/2025    CO2 24 03/07/2025    AGAP 5 12/02/2022    BUN 14 03/07/2025    CREATININE 0.71 03/07/2025    GLUC 90 03/07/2025    GLUF 90 07/11/2020    CALCIUM 9.0 12/02/2022    AST 16 03/07/2025    ALT 21 03/07/2025    ALKPHOS 82 02/05/2024    PROT 6.8 10/28/2017    TP 7.0 03/07/2025    BILITOT 0.8 10/28/2017    TBILI 0.7 03/07/2025    EGFR 95 03/07/2025     Lab Results   Component Value Date    HGBA1C 5.5 02/05/2024     Lab Results   Component Value Date    LNX1VIGXETWQ 1.304 07/05/2020    TSH 1.240 10/15/2024     Lab Results  "  Component Value Date    CHOLESTEROL 253 (H) 11/27/2024     Lab Results   Component Value Date    HDL 66 11/27/2024     Lab Results   Component Value Date    TRIG 110 11/27/2024     Lab Results   Component Value Date    LDLCALC 168 (H) 11/27/2024     No results found for: \"VITD\"  No components found for: \"FASTINS\"   "

## 2025-04-23 NOTE — ASSESSMENT & PLAN NOTE
"Patient was last seen in the office in June 2024.   at that time she was hesitant to initiate antiobesity medications.  She finished the classes with [employer] and attended the [employer] fitness program.  She reports she lost 14 pounds over the past year but regained it all back.  She attributes this to emotional/\"binge eating\"    Antiobesity Medications/Medical --  Patient patient obtained a prescription for Cymbalta which she is considering initiating for nerve pain.  We discussed that Cymbalta is an SNRI and Wellbutrin is a DNR I  Can consider low doses of Wellbutrin and naltrexone and monitor for any adrenergic effects  Patient will transition to Medicare in August-therefore she does not want to initiate any injectable medications at this time although she thinks Wegovy may be covered under her current  insurance.  Patient does not have history of MI/CVA, PAD  Patient has a recent sleep study that showed mild sleep apnea so she may not qualify for Zepbound  -Metoprolol itself has a weight promoting property, requested with patient to discuss with PCP about switching you to a different agent; metoprolol typically has to be weaned and cannot be stopped abruptly   Patient has no contraindication to Topamax Wellbutrin naltrexone and metformin  Would avoid phentermine given underlying anxiety      Nutrition:     Nutrition Prescription  Calories: 1,100-1,300 kcal  Protein: 63-78 g  Fluid: 61+ ounces  Has completed the healthy core program and may consider transitioning to the healthy ways for accountability       Physical Activity:   Patient states she suffers from nerve pain in her back that has limited her physical activity  Was going to [employer] and may resume fitness program    Sleep: -  Consider home sleep study as STOP-BANG score is 5/8   Mild FAYE  3/24    Food Behaviors/Stress/pyschosocial:    Emotional eating and \" binge eating\"  Patient had met with the  and was provided resources for various " counselors.  She has however not made a follow-up appointment.  Encouraged her to schedule an appointment with a counselor to help her with coping strategies surrounding her emotional triggers to eating-offered visit for the  which she declined at this time

## 2025-04-24 LAB
25(OH)D3+25(OH)D2 SERPL-MCNC: 42.1 NG/ML (ref 30–100)
ALBUMIN SERPL-MCNC: 4.4 G/DL (ref 3.9–4.9)
ALP SERPL-CCNC: 90 IU/L (ref 44–121)
ALT SERPL-CCNC: 18 IU/L (ref 0–32)
AST SERPL-CCNC: 23 IU/L (ref 0–40)
BASOPHILS # BLD AUTO: 0 X10E3/UL (ref 0–0.2)
BASOPHILS NFR BLD AUTO: 1 %
BILIRUB SERPL-MCNC: 0.9 MG/DL (ref 0–1.2)
BUN SERPL-MCNC: 12 MG/DL (ref 8–27)
BUN/CREAT SERPL: 16 (ref 12–28)
CALCIUM SERPL-MCNC: 9.6 MG/DL (ref 8.7–10.3)
CHLORIDE SERPL-SCNC: 104 MMOL/L (ref 96–106)
CHOLEST SERPL-MCNC: 208 MG/DL (ref 100–199)
CHOLEST/HDLC SERPL: 3.9 RATIO (ref 0–4.4)
CO2 SERPL-SCNC: 23 MMOL/L (ref 20–29)
CREAT SERPL-MCNC: 0.77 MG/DL (ref 0.57–1)
EGFR: 86 ML/MIN/1.73
EOSINOPHIL # BLD AUTO: 0.1 X10E3/UL (ref 0–0.4)
EOSINOPHIL NFR BLD AUTO: 3 %
ERYTHROCYTE [DISTWIDTH] IN BLOOD BY AUTOMATED COUNT: 13 % (ref 11.7–15.4)
GLOBULIN SER-MCNC: 2.5 G/DL (ref 1.5–4.5)
GLUCOSE SERPL-MCNC: 86 MG/DL (ref 70–99)
HCT VFR BLD AUTO: 43.3 % (ref 34–46.6)
HDLC SERPL-MCNC: 53 MG/DL
HGB BLD-MCNC: 14.7 G/DL (ref 11.1–15.9)
IMM GRANULOCYTES # BLD: 0 X10E3/UL (ref 0–0.1)
IMM GRANULOCYTES NFR BLD: 0 %
LDLC SERPL CALC-MCNC: 118 MG/DL (ref 0–99)
LYMPHOCYTES # BLD AUTO: 1.5 X10E3/UL (ref 0.7–3.1)
LYMPHOCYTES NFR BLD AUTO: 32 %
MCH RBC QN AUTO: 30.4 PG (ref 26.6–33)
MCHC RBC AUTO-ENTMCNC: 33.9 G/DL (ref 31.5–35.7)
MCV RBC AUTO: 90 FL (ref 79–97)
MONOCYTES # BLD AUTO: 0.4 X10E3/UL (ref 0.1–0.9)
MONOCYTES NFR BLD AUTO: 8 %
NEUTROPHILS # BLD AUTO: 2.7 X10E3/UL (ref 1.4–7)
NEUTROPHILS NFR BLD AUTO: 56 %
PLATELET # BLD AUTO: 134 X10E3/UL (ref 150–450)
POTASSIUM SERPL-SCNC: 4.4 MMOL/L (ref 3.5–5.2)
PROT SERPL-MCNC: 6.9 G/DL (ref 6–8.5)
RBC # BLD AUTO: 4.83 X10E6/UL (ref 3.77–5.28)
SL AMB VLDL CHOLESTEROL CALC: 37 MG/DL (ref 5–40)
SODIUM SERPL-SCNC: 144 MMOL/L (ref 134–144)
TRIGL SERPL-MCNC: 215 MG/DL (ref 0–149)
TSH SERPL DL<=0.005 MIU/L-ACNC: 1.22 UIU/ML (ref 0.45–4.5)
WBC # BLD AUTO: 4.8 X10E3/UL (ref 3.4–10.8)

## 2025-04-25 ENCOUNTER — TELEPHONE (OUTPATIENT)
Age: 65
End: 2025-04-25

## 2025-04-25 NOTE — TELEPHONE ENCOUNTER
Patient called back as she contacted her insurance and found that she can get a 3-month supply of the Zepbound through a mail order pharmacy. She would be interested in that as long as it is also free. She did not actually speak with an agent, but received the information through a voice phone script.

## 2025-04-25 NOTE — TELEPHONE ENCOUNTER
Patient would like to get started on her Zepbound medication. She is requesting a Script from Dr. Frazier. Please send to [facility] pharmacy on file and also send to  insurance for Prior-Auth. Please follow up with patient. Thank you.

## 2025-04-29 ENCOUNTER — OFFICE VISIT (OUTPATIENT)
Dept: FAMILY MEDICINE CLINIC | Facility: CLINIC | Age: 65
End: 2025-04-29
Payer: COMMERCIAL

## 2025-04-29 VITALS
SYSTOLIC BLOOD PRESSURE: 124 MMHG | BODY MASS INDEX: 36.32 KG/M2 | OXYGEN SATURATION: 98 % | HEIGHT: 63 IN | DIASTOLIC BLOOD PRESSURE: 78 MMHG | HEART RATE: 70 BPM | WEIGHT: 205 LBS | RESPIRATION RATE: 16 BRPM

## 2025-04-29 DIAGNOSIS — M47.816 LUMBAR SPONDYLOSIS: ICD-10-CM

## 2025-04-29 DIAGNOSIS — I10 PRIMARY HYPERTENSION: ICD-10-CM

## 2025-04-29 DIAGNOSIS — M54.42 CHRONIC BILATERAL LOW BACK PAIN WITH BILATERAL SCIATICA: ICD-10-CM

## 2025-04-29 DIAGNOSIS — E78.2 MIXED HYPERLIPIDEMIA: ICD-10-CM

## 2025-04-29 DIAGNOSIS — M79.2 NEURALGIA: ICD-10-CM

## 2025-04-29 DIAGNOSIS — E55.9 VITAMIN D DEFICIENCY: ICD-10-CM

## 2025-04-29 DIAGNOSIS — M54.41 CHRONIC BILATERAL LOW BACK PAIN WITH BILATERAL SCIATICA: ICD-10-CM

## 2025-04-29 DIAGNOSIS — G89.29 CHRONIC BILATERAL LOW BACK PAIN WITH BILATERAL SCIATICA: ICD-10-CM

## 2025-04-29 DIAGNOSIS — M54.16 RADICULOPATHY OF LUMBAR REGION: Primary | ICD-10-CM

## 2025-04-29 PROCEDURE — 99215 OFFICE O/P EST HI 40 MIN: CPT | Performed by: FAMILY MEDICINE

## 2025-04-29 NOTE — ASSESSMENT & PLAN NOTE
Cholesterol tremendously improved on rosuvastatin 10 mg once daily.  Triglycerides increased but she had run out of fish oil.  She needs to be taking both.  Repeat lipid panel in 6 months

## 2025-04-29 NOTE — ASSESSMENT & PLAN NOTE
Vitamin D normalized on vitamin D supplementation.  Continue vitamin D supplementation.  Repeat vitamin D level in 6 months

## 2025-04-29 NOTE — ASSESSMENT & PLAN NOTE
Hypertension remains very well-controlled on Toprol-XL 25 mg once daily.  Continue same.  Weight loss would be beneficial.  Patient is aware of this
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
## 2025-04-29 NOTE — ASSESSMENT & PLAN NOTE
Greatly appreciate pain management input.  Somewhat frustrated because she has not tried taking Cymbalta and also did not convey back to pain management that she could not tolerate physical therapy.  Order provided for MRI of the lumbar spine

## 2025-04-29 NOTE — ASSESSMENT & PLAN NOTE
Ongoing complaints.  She has seen neurology in the past, chiropractic treatments, did see pain management.  Pain management referred her to physical therapy.  She could not tolerate physical therapy due to pain and nausea.  She never conveyed this back to pain management for the next step.  I placed an order for MRI of the lumbar spine to reevaluate.  Her last MRI of the lumbar spine was done in 2019

## 2025-06-02 ENCOUNTER — HOSPITAL ENCOUNTER (OUTPATIENT)
Dept: MRI IMAGING | Facility: HOSPITAL | Age: 65
Discharge: HOME | End: 2025-06-02
Attending: FAMILY MEDICINE
Payer: COMMERCIAL

## 2025-06-02 DIAGNOSIS — M54.41 CHRONIC BILATERAL LOW BACK PAIN WITH BILATERAL SCIATICA: ICD-10-CM

## 2025-06-02 DIAGNOSIS — M54.42 CHRONIC BILATERAL LOW BACK PAIN WITH BILATERAL SCIATICA: ICD-10-CM

## 2025-06-02 DIAGNOSIS — M54.16 RADICULOPATHY OF LUMBAR REGION: ICD-10-CM

## 2025-06-02 DIAGNOSIS — M79.2 NEURALGIA: ICD-10-CM

## 2025-06-02 DIAGNOSIS — M47.816 LUMBAR SPONDYLOSIS: ICD-10-CM

## 2025-06-02 DIAGNOSIS — G89.29 CHRONIC BILATERAL LOW BACK PAIN WITH BILATERAL SCIATICA: ICD-10-CM

## 2025-06-02 PROCEDURE — 72148 MRI LUMBAR SPINE W/O DYE: CPT

## 2025-06-04 ENCOUNTER — RESULTS FOLLOW-UP (OUTPATIENT)
Dept: FAMILY MEDICINE CLINIC | Facility: CLINIC | Age: 65
End: 2025-06-04

## 2025-06-15 DIAGNOSIS — E78.2 MIXED HYPERLIPIDEMIA: ICD-10-CM

## 2025-06-16 RX ORDER — CHLORAL HYDRATE 500 MG
2 CAPSULE ORAL 2 TIMES DAILY
Qty: 180 CAPSULE | Refills: 4 | Status: SHIPPED | OUTPATIENT
Start: 2025-06-16

## 2025-07-08 ENCOUNTER — NURSE TRIAGE (OUTPATIENT)
Age: 65
End: 2025-07-08

## 2025-07-08 NOTE — TELEPHONE ENCOUNTER
"REASON FOR CONVERSATION: Medication Problem  Received a call doing a 3 hrs diet/or following and they are recommending flaxseed or flax oil, is that recommended from a cardiology standpoint  SYMPTOMS: n/a    OTHER HEALTH INFORMATION:   Last office visit 2/10/2025 Dr Danielle  PROTOCOL DISPOSITION: No disposition on file.    CARE ADVICE PROVIDED:   Recommend discussing with pharmacist, looking for recommendation from cardiologist    PRACTICE FOLLOW-UP:   Follow up on recommendations from provider      Reason for Disposition   Caller has NON-URGENT medicine question about med that PCP or specialist prescribed and triager unable to answer question    Answer Assessment - Initial Assessment Questions  1. NAME of MEDICINE: \"What medicine(s) are you calling about?\"      OTC flaxseed or flax oil  2. QUESTION: \"What is your question?\" (e.g., double dose of medicine, side effect)      Looking to find out from a cardiology standpoint if safe or ok current medicaitons  3. PRESCRIBER: \"Who prescribed the medicine?\" Reason: if prescribed by specialist, call should be referred to that group.      cardiology  4. SYMPTOMS: \"Do you have any symptoms?\" If Yes, ask: \"What symptoms are you having?\"  \"How bad are the symptoms (e.g., mild, moderate, severe)      N/a    Protocols used: Medication Question Call-Adult-[place]    "

## 2025-07-08 NOTE — TELEPHONE ENCOUNTER
A teaspoon of flaxseed oil with every meal as well as flaxseeds adding into meals. She wants to make sure it doesn't interact with her fish oil she takes already

## 2025-07-10 DIAGNOSIS — I83.811 VARICOSE VEINS OF RIGHT LOWER EXTREMITY WITH PAIN: Primary | ICD-10-CM

## 2025-07-18 ENCOUNTER — NURSE TRIAGE (OUTPATIENT)
Age: 65
End: 2025-07-18

## 2025-07-18 NOTE — TELEPHONE ENCOUNTER
"REASON FOR CONVERSATION: Dizziness    SYMPTOMS: dizzy, sweaty, lower BP intermittently    OTHER HEALTH INFORMATION: following a 1400cal diet for last few weeks, heat intolerance, outside makes symptoms worse.    BP 7/3/25 124/105; 7/16/25 95/75 after being outside    PROTOCOL DISPOSITION: Discuss With PCP and Callback by Nurse Today    CARE ADVICE PROVIDED: will discuss with Dr Danielle; possibly increase amount of calories/day; stay hydrated especially when outside in heat;     PRACTICE FOLLOW-UP: contact patient with any recommendations. Should Metoprolol be adjusted?      Reason for Disposition   Taking a medicine that could cause dizziness (e.g., blood pressure medications, diuretics)     Metoprolol 25mg daily    Answer Assessment - Initial Assessment Questions  1. DESCRIPTION: \"Describe your dizziness.\"      Gets hot, sweaty, woozy  2. LIGHTHEADED: \"Do you feel lightheaded?\" (e.g., somewhat faint, woozy, weak upon standing)      yes  3. VERTIGO: \"Do you feel like either you or the room is spinning or tilting?\" (i.e., vertigo)      denies  4. SEVERITY: \"How bad is it?\"  \"Do you feel like you are going to faint?\" \"Can you stand and walk?\"      Can walk/stand; no to fainting  5. ONSET:  \"When did the dizziness begin?\"      Over the last few weeks  6. AGGRAVATING FACTORS: \"Does anything make it worse?\" (e.g., standing, change in head position)      Out in heat, on beach    8. CAUSE: \"What do you think is causing the dizziness?\" (e.g., decreased fluids or food, diarrhea, emotional distress, heat exposure, new medicine, sudden standing, vomiting; unknown)      Metoprolol? Dieting, on a 1400cal/day diet for last few weeks  9. RECURRENT SYMPTOM: \"Have you had dizziness before?\" If Yes, ask: \"When was the last time?\" \"What happened that time?\"      denies  10. OTHER SYMPTOMS: \"Do you have any other symptoms?\" (e.g., fever, chest pain, vomiting, diarrhea, bleeding)        Diaphoretic at times, lower BP at times,     Has been " following a 1400cal/day diet last few weeks; has noticed heat intolerance, dizziness, sweaty episodes, lower than usual BP    Protocols used: Dizziness-Adult-[place]

## 2025-08-05 DIAGNOSIS — I10 PRIMARY HYPERTENSION: ICD-10-CM

## 2025-08-07 RX ORDER — METOPROLOL SUCCINATE 25 MG/1
25 TABLET, EXTENDED RELEASE ORAL DAILY
Qty: 100 TABLET | Refills: 1 | Status: SHIPPED | OUTPATIENT
Start: 2025-08-07

## 2025-08-08 ENCOUNTER — OFFICE VISIT (OUTPATIENT)
Dept: FAMILY MEDICINE CLINIC | Facility: CLINIC | Age: 65
End: 2025-08-08
Payer: COMMERCIAL

## 2025-08-08 VITALS
SYSTOLIC BLOOD PRESSURE: 118 MMHG | WEIGHT: 201 LBS | HEIGHT: 63 IN | HEART RATE: 74 BPM | BODY MASS INDEX: 35.61 KG/M2 | OXYGEN SATURATION: 97 % | RESPIRATION RATE: 16 BRPM | DIASTOLIC BLOOD PRESSURE: 68 MMHG

## 2025-08-08 DIAGNOSIS — G89.29 CHRONIC BILATERAL LOW BACK PAIN WITH BILATERAL SCIATICA: Primary | ICD-10-CM

## 2025-08-08 DIAGNOSIS — M47.816 LUMBAR SPONDYLOSIS: ICD-10-CM

## 2025-08-08 DIAGNOSIS — M54.42 CHRONIC BILATERAL LOW BACK PAIN WITH BILATERAL SCIATICA: Primary | ICD-10-CM

## 2025-08-08 DIAGNOSIS — M54.41 CHRONIC BILATERAL LOW BACK PAIN WITH BILATERAL SCIATICA: Primary | ICD-10-CM

## 2025-08-08 DIAGNOSIS — M54.16 RADICULOPATHY OF LUMBAR REGION: ICD-10-CM

## 2025-08-08 DIAGNOSIS — G60.9 IDIOPATHIC PERIPHERAL NEUROPATHY: ICD-10-CM

## 2025-08-08 PROCEDURE — 99214 OFFICE O/P EST MOD 30 MIN: CPT | Performed by: FAMILY MEDICINE

## 2025-08-08 RX ORDER — GABAPENTIN 100 MG/1
100 CAPSULE ORAL 3 TIMES DAILY
Qty: 90 CAPSULE | Refills: 1 | Status: SHIPPED | OUTPATIENT
Start: 2025-08-08

## (undated) DEVICE — BLADE SHAVER DISSECTOR 3.5MM 13CM COOLCUT

## (undated) DEVICE — TUBING SUCTION 5MM X 12 FT

## (undated) DEVICE — BETHLEHEM UNIVERSAL  ARTHRO PK: Brand: CARDINAL HEALTH

## (undated) DEVICE — GLOVE INDICATOR PI UNDERGLOVE SZ 8.5 BLUE

## (undated) DEVICE — TUBING ARTHROSCOPY REDUCE PATIENT

## (undated) DEVICE — ACE WRAP 6 IN UNSTERILE

## (undated) DEVICE — NEEDLE 22 G X 1 1/2 SAFETY

## (undated) DEVICE — GAUZE SPONGES,16 PLY: Brand: CURITY

## (undated) DEVICE — CHLORAPREP HI-LITE 26ML ORANGE

## (undated) DEVICE — INTENDED FOR TISSUE SEPARATION, AND OTHER PROCEDURES THAT REQUIRE A SHARP SURGICAL BLADE TO PUNCTURE OR CUT.: Brand: BARD-PARKER ® CARBON RIB-BACK BLADES

## (undated) DEVICE — 3M™ STERI-STRIP™ REINFORCED ADHESIVE SKIN CLOSURES, R1547, 1/2 IN X 4 IN (12 MM X 100 MM), 6 STRIPS/ENVELOPE: Brand: 3M™ STERI-STRIP™

## (undated) DEVICE — GLOVE SRG BIOGEL 8.5

## (undated) DEVICE — IMPERVIOUS STOCKINETTE: Brand: DEROYAL

## (undated) DEVICE — VIAL DECANTER

## (undated) DEVICE — SUT MONOCRYL 3-0 PS-2 18 IN Y497G

## (undated) DEVICE — BLADE SHAVER DISSECTOR 4MM 13CM COOLCUT

## (undated) DEVICE — SYRINGE 20ML LL

## (undated) DEVICE — PADDING CAST 4 IN  COTTON STRL